# Patient Record
Sex: MALE | Race: WHITE | URBAN - METROPOLITAN AREA
[De-identification: names, ages, dates, MRNs, and addresses within clinical notes are randomized per-mention and may not be internally consistent; named-entity substitution may affect disease eponyms.]

---

## 2017-01-24 ENCOUNTER — OFFICE VISIT (OUTPATIENT)
Dept: FAMILY MEDICINE CLINIC | Age: 72
End: 2017-01-24

## 2017-01-24 VITALS
WEIGHT: 225 LBS | BODY MASS INDEX: 32.21 KG/M2 | HEART RATE: 78 BPM | HEIGHT: 70 IN | DIASTOLIC BLOOD PRESSURE: 76 MMHG | SYSTOLIC BLOOD PRESSURE: 122 MMHG

## 2017-01-24 DIAGNOSIS — Z12.11 SCREEN FOR COLON CANCER: ICD-10-CM

## 2017-01-24 DIAGNOSIS — E78.2 MIXED HYPERLIPIDEMIA: ICD-10-CM

## 2017-01-24 DIAGNOSIS — Z11.59 NEED FOR HEPATITIS B SCREENING TEST: ICD-10-CM

## 2017-01-24 DIAGNOSIS — I10 ESSENTIAL HYPERTENSION: Primary | ICD-10-CM

## 2017-01-24 LAB
A/G RATIO: 1.4 (ref 1.1–2.2)
ALBUMIN SERPL-MCNC: 4.4 G/DL (ref 3.4–5)
ALP BLD-CCNC: 82 U/L (ref 40–129)
ALT SERPL-CCNC: 16 U/L (ref 10–40)
ANION GAP SERPL CALCULATED.3IONS-SCNC: 14 MMOL/L (ref 3–16)
AST SERPL-CCNC: 19 U/L (ref 15–37)
BILIRUB SERPL-MCNC: 0.4 MG/DL (ref 0–1)
BUN BLDV-MCNC: 17 MG/DL (ref 7–20)
CALCIUM SERPL-MCNC: 10.3 MG/DL (ref 8.3–10.6)
CHLORIDE BLD-SCNC: 102 MMOL/L (ref 99–110)
CO2: 25 MMOL/L (ref 21–32)
CREAT SERPL-MCNC: 1 MG/DL (ref 0.8–1.3)
GFR AFRICAN AMERICAN: >60
GFR NON-AFRICAN AMERICAN: >60
GLOBULIN: 3.2 G/DL
GLUCOSE BLD-MCNC: 98 MG/DL (ref 70–99)
HAV IGM SER IA-ACNC: NORMAL
HEPATITIS B CORE IGM ANTIBODY: NORMAL
HEPATITIS B SURFACE ANTIGEN INTERPRETATION: NORMAL
HEPATITIS C ANTIBODY INTERPRETATION: NORMAL
POTASSIUM SERPL-SCNC: 4.9 MMOL/L (ref 3.5–5.1)
SODIUM BLD-SCNC: 141 MMOL/L (ref 136–145)
TOTAL PROTEIN: 7.6 G/DL (ref 6.4–8.2)

## 2017-01-24 PROCEDURE — 99214 OFFICE O/P EST MOD 30 MIN: CPT | Performed by: FAMILY MEDICINE

## 2017-01-24 PROCEDURE — 3288F FALL RISK ASSESSMENT DOCD: CPT | Performed by: FAMILY MEDICINE

## 2017-01-24 PROCEDURE — 36415 COLL VENOUS BLD VENIPUNCTURE: CPT | Performed by: FAMILY MEDICINE

## 2017-01-24 PROCEDURE — G8510 SCR DEP NEG, NO PLAN REQD: HCPCS | Performed by: FAMILY MEDICINE

## 2017-01-24 ASSESSMENT — PATIENT HEALTH QUESTIONNAIRE - PHQ9
1. LITTLE INTEREST OR PLEASURE IN DOING THINGS: 0
2. FEELING DOWN, DEPRESSED OR HOPELESS: 0
SUM OF ALL RESPONSES TO PHQ9 QUESTIONS 1 & 2: 0
SUM OF ALL RESPONSES TO PHQ QUESTIONS 1-9: 0

## 2017-01-25 LAB — HIV-1 AND HIV-2 ANTIBODIES: NORMAL

## 2017-01-26 ENCOUNTER — TELEPHONE (OUTPATIENT)
Dept: FAMILY MEDICINE CLINIC | Age: 72
End: 2017-01-26

## 2017-04-05 ENCOUNTER — TELEPHONE (OUTPATIENT)
Dept: FAMILY MEDICINE CLINIC | Age: 72
End: 2017-04-05

## 2017-04-20 RX ORDER — HYDROCHLOROTHIAZIDE 25 MG/1
TABLET ORAL
Qty: 90 TABLET | Refills: 3 | Status: SHIPPED | OUTPATIENT
Start: 2017-04-20 | End: 2018-06-27 | Stop reason: SDUPTHER

## 2017-05-23 ENCOUNTER — OFFICE VISIT (OUTPATIENT)
Dept: FAMILY MEDICINE CLINIC | Age: 72
End: 2017-05-23

## 2017-05-23 VITALS
SYSTOLIC BLOOD PRESSURE: 160 MMHG | HEART RATE: 74 BPM | HEIGHT: 68 IN | WEIGHT: 228 LBS | DIASTOLIC BLOOD PRESSURE: 88 MMHG | BODY MASS INDEX: 34.56 KG/M2

## 2017-05-23 DIAGNOSIS — G47.33 OBSTRUCTIVE SLEEP APNEA SYNDROME: ICD-10-CM

## 2017-05-23 DIAGNOSIS — I10 ESSENTIAL HYPERTENSION: Primary | ICD-10-CM

## 2017-05-23 DIAGNOSIS — E78.2 MIXED HYPERLIPIDEMIA: ICD-10-CM

## 2017-05-23 LAB
A/G RATIO: 1.4 (ref 1.1–2.2)
ALBUMIN SERPL-MCNC: 4.4 G/DL (ref 3.4–5)
ALP BLD-CCNC: 78 U/L (ref 40–129)
ALT SERPL-CCNC: 18 U/L (ref 10–40)
ANION GAP SERPL CALCULATED.3IONS-SCNC: 16 MMOL/L (ref 3–16)
AST SERPL-CCNC: 23 U/L (ref 15–37)
BILIRUB SERPL-MCNC: 0.4 MG/DL (ref 0–1)
BUN BLDV-MCNC: 15 MG/DL (ref 7–20)
CALCIUM SERPL-MCNC: 9.6 MG/DL (ref 8.3–10.6)
CHLORIDE BLD-SCNC: 105 MMOL/L (ref 99–110)
CO2: 22 MMOL/L (ref 21–32)
CREAT SERPL-MCNC: 0.9 MG/DL (ref 0.8–1.3)
GFR AFRICAN AMERICAN: >60
GFR NON-AFRICAN AMERICAN: >60
GLOBULIN: 3.1 G/DL
GLUCOSE BLD-MCNC: 100 MG/DL (ref 70–99)
POTASSIUM SERPL-SCNC: 4.8 MMOL/L (ref 3.5–5.1)
SODIUM BLD-SCNC: 143 MMOL/L (ref 136–145)
TOTAL PROTEIN: 7.5 G/DL (ref 6.4–8.2)

## 2017-05-23 PROCEDURE — 36415 COLL VENOUS BLD VENIPUNCTURE: CPT | Performed by: FAMILY MEDICINE

## 2017-05-23 PROCEDURE — 99214 OFFICE O/P EST MOD 30 MIN: CPT | Performed by: FAMILY MEDICINE

## 2017-05-25 ENCOUNTER — TELEPHONE (OUTPATIENT)
Dept: FAMILY MEDICINE CLINIC | Age: 72
End: 2017-05-25

## 2017-06-16 ENCOUNTER — OFFICE VISIT (OUTPATIENT)
Dept: FAMILY MEDICINE CLINIC | Age: 72
End: 2017-06-16

## 2017-06-16 VITALS
HEART RATE: 80 BPM | WEIGHT: 220 LBS | SYSTOLIC BLOOD PRESSURE: 160 MMHG | HEIGHT: 70 IN | BODY MASS INDEX: 31.5 KG/M2 | DIASTOLIC BLOOD PRESSURE: 88 MMHG

## 2017-06-16 DIAGNOSIS — G47.33 OBSTRUCTIVE SLEEP APNEA SYNDROME: Primary | ICD-10-CM

## 2017-06-16 DIAGNOSIS — I10 ESSENTIAL HYPERTENSION: ICD-10-CM

## 2017-06-16 PROCEDURE — 99213 OFFICE O/P EST LOW 20 MIN: CPT | Performed by: FAMILY MEDICINE

## 2017-06-26 RX ORDER — AMLODIPINE BESYLATE AND BENAZEPRIL HYDROCHLORIDE 10; 20 MG/1; MG/1
CAPSULE ORAL
Qty: 90 CAPSULE | Refills: 3 | Status: SHIPPED | OUTPATIENT
Start: 2017-06-26 | End: 2018-07-16 | Stop reason: SDUPTHER

## 2017-07-14 ENCOUNTER — OFFICE VISIT (OUTPATIENT)
Dept: FAMILY MEDICINE CLINIC | Age: 72
End: 2017-07-14

## 2017-07-14 VITALS
DIASTOLIC BLOOD PRESSURE: 80 MMHG | WEIGHT: 230 LBS | BODY MASS INDEX: 32.93 KG/M2 | SYSTOLIC BLOOD PRESSURE: 136 MMHG | HEIGHT: 70 IN | HEART RATE: 68 BPM

## 2017-07-14 DIAGNOSIS — E78.2 MIXED HYPERLIPIDEMIA: ICD-10-CM

## 2017-07-14 DIAGNOSIS — Z12.11 SCREEN FOR COLON CANCER: ICD-10-CM

## 2017-07-14 DIAGNOSIS — G47.33 OBSTRUCTIVE SLEEP APNEA SYNDROME: ICD-10-CM

## 2017-07-14 DIAGNOSIS — I10 ESSENTIAL HYPERTENSION: Primary | ICD-10-CM

## 2017-07-14 LAB
A/G RATIO: 1.5 (ref 1.1–2.2)
ALBUMIN SERPL-MCNC: 4.7 G/DL (ref 3.4–5)
ALP BLD-CCNC: 72 U/L (ref 40–129)
ALT SERPL-CCNC: 19 U/L (ref 10–40)
ANION GAP SERPL CALCULATED.3IONS-SCNC: 13 MMOL/L (ref 3–16)
AST SERPL-CCNC: 21 U/L (ref 15–37)
BILIRUB SERPL-MCNC: 0.4 MG/DL (ref 0–1)
BUN BLDV-MCNC: 14 MG/DL (ref 7–20)
CALCIUM SERPL-MCNC: 10.4 MG/DL (ref 8.3–10.6)
CHLORIDE BLD-SCNC: 101 MMOL/L (ref 99–110)
CHOLESTEROL, TOTAL: 202 MG/DL (ref 0–199)
CO2: 26 MMOL/L (ref 21–32)
CREAT SERPL-MCNC: 0.9 MG/DL (ref 0.8–1.3)
GFR AFRICAN AMERICAN: >60
GFR NON-AFRICAN AMERICAN: >60
GLOBULIN: 3.2 G/DL
GLUCOSE BLD-MCNC: 115 MG/DL (ref 70–99)
HDLC SERPL-MCNC: 34 MG/DL (ref 40–60)
LDL CHOLESTEROL CALCULATED: 110 MG/DL
POTASSIUM SERPL-SCNC: 4.3 MMOL/L (ref 3.5–5.1)
SODIUM BLD-SCNC: 140 MMOL/L (ref 136–145)
TOTAL PROTEIN: 7.9 G/DL (ref 6.4–8.2)
TRIGL SERPL-MCNC: 289 MG/DL (ref 0–150)
VLDLC SERPL CALC-MCNC: 58 MG/DL

## 2017-07-14 PROCEDURE — 36415 COLL VENOUS BLD VENIPUNCTURE: CPT | Performed by: FAMILY MEDICINE

## 2017-07-14 PROCEDURE — 99214 OFFICE O/P EST MOD 30 MIN: CPT | Performed by: FAMILY MEDICINE

## 2017-08-17 ENCOUNTER — TELEPHONE (OUTPATIENT)
Dept: FAMILY MEDICINE CLINIC | Age: 72
End: 2017-08-17

## 2017-08-18 ENCOUNTER — TELEPHONE (OUTPATIENT)
Dept: FAMILY MEDICINE CLINIC | Age: 72
End: 2017-08-18

## 2017-10-05 ENCOUNTER — TELEPHONE (OUTPATIENT)
Dept: OTHER | Facility: CLINIC | Age: 72
End: 2017-10-05

## 2017-10-12 ENCOUNTER — TELEPHONE (OUTPATIENT)
Dept: FAMILY MEDICINE CLINIC | Age: 72
End: 2017-10-12

## 2017-11-09 ENCOUNTER — TELEPHONE (OUTPATIENT)
Dept: FAMILY MEDICINE CLINIC | Age: 72
End: 2017-11-09

## 2017-11-20 ENCOUNTER — OFFICE VISIT (OUTPATIENT)
Dept: FAMILY MEDICINE CLINIC | Age: 72
End: 2017-11-20

## 2017-11-20 VITALS
HEIGHT: 70 IN | BODY MASS INDEX: 32.64 KG/M2 | HEART RATE: 78 BPM | DIASTOLIC BLOOD PRESSURE: 92 MMHG | SYSTOLIC BLOOD PRESSURE: 142 MMHG | WEIGHT: 228 LBS

## 2017-11-20 DIAGNOSIS — Z12.5 SCREENING FOR PROSTATE CANCER: ICD-10-CM

## 2017-11-20 DIAGNOSIS — I10 ESSENTIAL HYPERTENSION: ICD-10-CM

## 2017-11-20 DIAGNOSIS — Z12.11 SCREEN FOR COLON CANCER: ICD-10-CM

## 2017-11-20 DIAGNOSIS — G47.33 OBSTRUCTIVE SLEEP APNEA SYNDROME: ICD-10-CM

## 2017-11-20 DIAGNOSIS — E78.2 MIXED HYPERLIPIDEMIA: Primary | ICD-10-CM

## 2017-11-20 DIAGNOSIS — Z23 NEEDS FLU SHOT: ICD-10-CM

## 2017-11-20 DIAGNOSIS — Z23 NEED FOR PROPHYLACTIC VACCINATION AGAINST STREPTOCOCCUS PNEUMONIAE (PNEUMOCOCCUS): ICD-10-CM

## 2017-11-20 LAB
A/G RATIO: 1.3 (ref 1.1–2.2)
ALBUMIN SERPL-MCNC: 4.3 G/DL (ref 3.4–5)
ALP BLD-CCNC: 80 U/L (ref 40–129)
ALT SERPL-CCNC: 21 U/L (ref 10–40)
ANION GAP SERPL CALCULATED.3IONS-SCNC: 14 MMOL/L (ref 3–16)
AST SERPL-CCNC: 25 U/L (ref 15–37)
BILIRUB SERPL-MCNC: 0.6 MG/DL (ref 0–1)
BUN BLDV-MCNC: 16 MG/DL (ref 7–20)
CALCIUM SERPL-MCNC: 10 MG/DL (ref 8.3–10.6)
CHLORIDE BLD-SCNC: 98 MMOL/L (ref 99–110)
CO2: 25 MMOL/L (ref 21–32)
CREAT SERPL-MCNC: 1.1 MG/DL (ref 0.8–1.3)
GFR AFRICAN AMERICAN: >60
GFR NON-AFRICAN AMERICAN: >60
GLOBULIN: 3.2 G/DL
GLUCOSE BLD-MCNC: 125 MG/DL (ref 70–99)
POTASSIUM SERPL-SCNC: 3.9 MMOL/L (ref 3.5–5.1)
SODIUM BLD-SCNC: 137 MMOL/L (ref 136–145)
TOTAL PROTEIN: 7.5 G/DL (ref 6.4–8.2)

## 2017-11-20 PROCEDURE — G0009 ADMIN PNEUMOCOCCAL VACCINE: HCPCS | Performed by: FAMILY MEDICINE

## 2017-11-20 PROCEDURE — 99214 OFFICE O/P EST MOD 30 MIN: CPT | Performed by: FAMILY MEDICINE

## 2017-11-20 PROCEDURE — 90670 PCV13 VACCINE IM: CPT | Performed by: FAMILY MEDICINE

## 2017-11-20 PROCEDURE — 36415 COLL VENOUS BLD VENIPUNCTURE: CPT | Performed by: FAMILY MEDICINE

## 2017-11-20 PROCEDURE — G0008 ADMIN INFLUENZA VIRUS VAC: HCPCS | Performed by: FAMILY MEDICINE

## 2017-11-20 PROCEDURE — 90662 IIV NO PRSV INCREASED AG IM: CPT | Performed by: FAMILY MEDICINE

## 2017-11-20 NOTE — PROGRESS NOTES
Chief Complaint   Patient presents with    3 Month Follow-Up    Hypertension       HPI / ROS: Eddie Anton presents for evaluation and management of :    # HTN denies CP/SOB shaen meds takes baby asa daily aslo  #2 hyperTG on fenofibrate shane this  Labs UTD  Lab Results   Component Value Date    LDLCALC 110 (H) 07/14/2017    LDLDIRECT 146 (H) 09/19/2014       Lab Results   Component Value Date    LDLCALC 118 (H) 06/24/2016    LDLDIRECT 146 (H) 09/19/2014     # JANICE using CPAP machine shane this helps sleep feels well on this  # screen prostate due  # screen colon ca FIT cards advised  He lost his stool cards    Lab Results   Component Value Date    PSA 2.12 09/23/2016    PSA 2.53 07/10/2015    PSA 2.95 06/21/2013         Patient's allergies, past family, medical, surgical, and social history, Rx and OTC meds are reviewed as part of today's visit and Marked as Reviewed. Objective   Wt Readings from Last 3 Encounters:   11/20/17 228 lb (103.4 kg)   07/14/17 230 lb (104.3 kg)   06/16/17 220 lb (99.8 kg)       A&O  BP (!) 142/92   Pulse 78   Ht 5' 10\" (1.778 m)   Wt 228 lb (103.4 kg)   BMI 32.71 kg/m²   Eyes no scleral icterus  Skin no rash no jaundice  Neck no TMG no LAD  Car reg no MGR  Lungs CTA  abd benign soft  Ext no pitting edema  Psych: Judgement and insight are intact, no pressured speech; no psychomotor retardation or agitation; affect and mood congruent    1. Mixed hyperlipidemia  Comprehensive Metabolic Panel    LFTs on fenofibrate   2. Essential hypertension  Comprehensive Metabolic Panel    at goal check renal cont meds   3. Obstructive sleep apnea syndrome      advised be continuous with cpap he is hit and miss   4. Needs flu shot  INFLUENZA, HIGH DOSE, 65 YRS +, IM, PF, PREFILL SYR, 0.5ML (FLUZONE HD)   5. Need for prophylactic vaccination against Streptococcus pneumoniae (pneumococcus)  Pneumococcal conjugate vaccine 13-valent IM (PREVNAR 13)   6. Screening for prostate cancer  Psa screening   7. Screen for colon cancer  POCT Fecal Immunochemical Test (FIT)     Orders Placed This Encounter   Procedures    INFLUENZA, HIGH DOSE, 65 YRS +, IM, PF, PREFILL SYR, 0.5ML (FLUZONE HD)    Pneumococcal conjugate vaccine 13-valent IM (PREVNAR 13)    Comprehensive Metabolic Panel    Psa screening    POCT Fecal Immunochemical Test (FIT)     Standing Status:   Future     Standing Expiration Date:   11/20/2018

## 2017-11-21 LAB — PROSTATE SPECIFIC ANTIGEN: 2.46 NG/ML (ref 0–4)

## 2018-01-16 ENCOUNTER — TELEPHONE (OUTPATIENT)
Dept: FAMILY MEDICINE CLINIC | Age: 73
End: 2018-01-16

## 2018-03-01 ENCOUNTER — TELEPHONE (OUTPATIENT)
Dept: FAMILY MEDICINE CLINIC | Age: 73
End: 2018-03-01

## 2018-03-01 NOTE — LETTER
[facility]  [facility]  [id number]  Dept: [id number] [facility] MD Erica        3/1/2018    [place] [phone]      Dear Mariah Stack:    This letter is a reminder that have a stool card that has not been completed. Please complete the stool collection and drop it back off. It is important to your well-being that these test(s) are performed. Please call our office at Dept: [phone] for additional information on the outstanding tests or let us know if they have been completed so we may update your chart.     Sincerely,        Penny Summers MD

## 2018-03-20 ENCOUNTER — OFFICE VISIT (OUTPATIENT)
Dept: FAMILY MEDICINE CLINIC | Age: 73
End: 2018-03-20

## 2018-03-20 VITALS
SYSTOLIC BLOOD PRESSURE: 136 MMHG | HEIGHT: 70 IN | BODY MASS INDEX: 32.71 KG/M2 | OXYGEN SATURATION: 97 % | WEIGHT: 228.5 LBS | HEART RATE: 68 BPM | DIASTOLIC BLOOD PRESSURE: 84 MMHG

## 2018-03-20 DIAGNOSIS — E78.2 MIXED HYPERLIPIDEMIA: Primary | ICD-10-CM

## 2018-03-20 DIAGNOSIS — G47.33 OBSTRUCTIVE SLEEP APNEA SYNDROME: ICD-10-CM

## 2018-03-20 DIAGNOSIS — K21.00 GASTROESOPHAGEAL REFLUX DISEASE WITH ESOPHAGITIS: ICD-10-CM

## 2018-03-20 DIAGNOSIS — Z12.11 SCREEN FOR COLON CANCER: ICD-10-CM

## 2018-03-20 DIAGNOSIS — I10 ESSENTIAL HYPERTENSION: ICD-10-CM

## 2018-03-20 LAB
A/G RATIO: 1.6 (ref 1.1–2.2)
ALBUMIN SERPL-MCNC: 4.6 G/DL (ref 3.4–5)
ALP BLD-CCNC: 80 U/L (ref 40–129)
ALT SERPL-CCNC: 17 U/L (ref 10–40)
ANION GAP SERPL CALCULATED.3IONS-SCNC: 17 MMOL/L (ref 3–16)
AST SERPL-CCNC: 18 U/L (ref 15–37)
BILIRUB SERPL-MCNC: 0.5 MG/DL (ref 0–1)
BUN BLDV-MCNC: 11 MG/DL (ref 7–20)
CALCIUM SERPL-MCNC: 9.3 MG/DL (ref 8.3–10.6)
CHLORIDE BLD-SCNC: 100 MMOL/L (ref 99–110)
CO2: 26 MMOL/L (ref 21–32)
CONTROL: NORMAL
CREAT SERPL-MCNC: 0.8 MG/DL (ref 0.8–1.3)
GFR AFRICAN AMERICAN: >60
GFR NON-AFRICAN AMERICAN: >60
GLOBULIN: 2.9 G/DL
GLUCOSE BLD-MCNC: 135 MG/DL (ref 70–99)
HEMOCCULT STL QL: NEGATIVE
POTASSIUM SERPL-SCNC: 4.1 MMOL/L (ref 3.5–5.1)
SODIUM BLD-SCNC: 143 MMOL/L (ref 136–145)
TOTAL PROTEIN: 7.5 G/DL (ref 6.4–8.2)

## 2018-03-20 PROCEDURE — 82274 ASSAY TEST FOR BLOOD FECAL: CPT | Performed by: FAMILY MEDICINE

## 2018-03-20 PROCEDURE — 99213 OFFICE O/P EST LOW 20 MIN: CPT | Performed by: FAMILY MEDICINE

## 2018-03-20 PROCEDURE — 36415 COLL VENOUS BLD VENIPUNCTURE: CPT | Performed by: FAMILY MEDICINE

## 2018-03-20 ASSESSMENT — PATIENT HEALTH QUESTIONNAIRE - PHQ9
2. FEELING DOWN, DEPRESSED OR HOPELESS: 0
SUM OF ALL RESPONSES TO PHQ9 QUESTIONS 1 & 2: 0
SUM OF ALL RESPONSES TO PHQ QUESTIONS 1-9: 0
1. LITTLE INTEREST OR PLEASURE IN DOING THINGS: 0

## 2018-04-25 ENCOUNTER — OFFICE VISIT (OUTPATIENT)
Dept: FAMILY MEDICINE CLINIC | Age: 73
End: 2018-04-25

## 2018-04-25 VITALS
HEART RATE: 100 BPM | HEIGHT: 70 IN | BODY MASS INDEX: 32.93 KG/M2 | DIASTOLIC BLOOD PRESSURE: 88 MMHG | SYSTOLIC BLOOD PRESSURE: 138 MMHG | WEIGHT: 230 LBS | OXYGEN SATURATION: 98 %

## 2018-04-25 DIAGNOSIS — M25.562 ACUTE PAIN OF LEFT KNEE: Primary | ICD-10-CM

## 2018-04-25 LAB — URIC ACID, SERUM: 6.2 MG/DL (ref 3.5–7.2)

## 2018-04-25 PROCEDURE — 36415 COLL VENOUS BLD VENIPUNCTURE: CPT | Performed by: FAMILY MEDICINE

## 2018-04-25 PROCEDURE — 99213 OFFICE O/P EST LOW 20 MIN: CPT | Performed by: FAMILY MEDICINE

## 2018-04-26 ENCOUNTER — TELEPHONE (OUTPATIENT)
Dept: FAMILY MEDICINE CLINIC | Age: 73
End: 2018-04-26

## 2018-05-02 ENCOUNTER — PROCEDURE VISIT (OUTPATIENT)
Dept: FAMILY MEDICINE CLINIC | Age: 73
End: 2018-05-02

## 2018-05-02 VITALS
DIASTOLIC BLOOD PRESSURE: 86 MMHG | OXYGEN SATURATION: 97 % | HEIGHT: 70 IN | SYSTOLIC BLOOD PRESSURE: 132 MMHG | BODY MASS INDEX: 32.43 KG/M2 | WEIGHT: 226.5 LBS | HEART RATE: 64 BPM

## 2018-05-02 DIAGNOSIS — M17.12 PRIMARY OSTEOARTHRITIS OF LEFT KNEE: Primary | ICD-10-CM

## 2018-05-02 PROCEDURE — 20610 DRAIN/INJ JOINT/BURSA W/O US: CPT | Performed by: FAMILY MEDICINE

## 2018-05-02 PROCEDURE — 99999 PR OFFICE/OUTPT VISIT,PROCEDURE ONLY: CPT | Performed by: FAMILY MEDICINE

## 2018-05-07 RX ORDER — METHYLPREDNISOLONE ACETATE 80 MG/ML
80 INJECTION, SUSPENSION INTRA-ARTICULAR; INTRALESIONAL; INTRAMUSCULAR; SOFT TISSUE ONCE
Status: COMPLETED | OUTPATIENT
Start: 2018-05-07 | End: 2018-05-08

## 2018-05-08 RX ADMIN — METHYLPREDNISOLONE ACETATE 80 MG: 80 INJECTION, SUSPENSION INTRA-ARTICULAR; INTRALESIONAL; INTRAMUSCULAR; SOFT TISSUE at 09:03

## 2018-06-28 RX ORDER — HYDROCHLOROTHIAZIDE 25 MG/1
TABLET ORAL
Qty: 90 TABLET | Refills: 3 | Status: SHIPPED | OUTPATIENT
Start: 2018-06-28 | End: 2019-08-31 | Stop reason: SDUPTHER

## 2018-07-16 DIAGNOSIS — I10 ESSENTIAL HYPERTENSION: Primary | ICD-10-CM

## 2018-07-16 RX ORDER — AMLODIPINE BESYLATE AND BENAZEPRIL HYDROCHLORIDE 10; 20 MG/1; MG/1
CAPSULE ORAL
Qty: 90 CAPSULE | Refills: 3 | Status: SHIPPED | OUTPATIENT
Start: 2018-07-16 | End: 2019-10-11 | Stop reason: SDUPTHER

## 2018-08-30 ENCOUNTER — OFFICE VISIT (OUTPATIENT)
Dept: FAMILY MEDICINE CLINIC | Age: 73
End: 2018-08-30

## 2018-08-30 VITALS
SYSTOLIC BLOOD PRESSURE: 126 MMHG | HEIGHT: 70 IN | HEART RATE: 58 BPM | WEIGHT: 231.13 LBS | BODY MASS INDEX: 33.09 KG/M2 | OXYGEN SATURATION: 97 % | DIASTOLIC BLOOD PRESSURE: 70 MMHG

## 2018-08-30 DIAGNOSIS — I10 ESSENTIAL HYPERTENSION: ICD-10-CM

## 2018-08-30 DIAGNOSIS — G47.33 OBSTRUCTIVE SLEEP APNEA SYNDROME: ICD-10-CM

## 2018-08-30 DIAGNOSIS — E78.2 MIXED HYPERLIPIDEMIA: Primary | ICD-10-CM

## 2018-08-30 DIAGNOSIS — K21.00 GASTROESOPHAGEAL REFLUX DISEASE WITH ESOPHAGITIS: ICD-10-CM

## 2018-08-30 LAB
A/G RATIO: 1.5 (ref 1.1–2.2)
ALBUMIN SERPL-MCNC: 4.5 G/DL (ref 3.4–5)
ALP BLD-CCNC: 74 U/L (ref 40–129)
ALT SERPL-CCNC: 17 U/L (ref 10–40)
ANION GAP SERPL CALCULATED.3IONS-SCNC: 14 MMOL/L (ref 3–16)
AST SERPL-CCNC: 20 U/L (ref 15–37)
BILIRUB SERPL-MCNC: 0.4 MG/DL (ref 0–1)
BUN BLDV-MCNC: 13 MG/DL (ref 7–20)
CALCIUM SERPL-MCNC: 9.9 MG/DL (ref 8.3–10.6)
CHLORIDE BLD-SCNC: 104 MMOL/L (ref 99–110)
CHOLESTEROL, TOTAL: 194 MG/DL (ref 0–199)
CO2: 22 MMOL/L (ref 21–32)
CREAT SERPL-MCNC: 0.9 MG/DL (ref 0.8–1.3)
GFR AFRICAN AMERICAN: >60
GFR NON-AFRICAN AMERICAN: >60
GLOBULIN: 3 G/DL
GLUCOSE BLD-MCNC: 108 MG/DL (ref 70–99)
HDLC SERPL-MCNC: 33 MG/DL (ref 40–60)
LDL CHOLESTEROL CALCULATED: 112 MG/DL
POTASSIUM SERPL-SCNC: 4.3 MMOL/L (ref 3.5–5.1)
SODIUM BLD-SCNC: 140 MMOL/L (ref 136–145)
TOTAL PROTEIN: 7.5 G/DL (ref 6.4–8.2)
TRIGL SERPL-MCNC: 243 MG/DL (ref 0–150)
VLDLC SERPL CALC-MCNC: 49 MG/DL

## 2018-08-30 PROCEDURE — 36415 COLL VENOUS BLD VENIPUNCTURE: CPT | Performed by: FAMILY MEDICINE

## 2018-08-30 PROCEDURE — 99214 OFFICE O/P EST MOD 30 MIN: CPT | Performed by: FAMILY MEDICINE

## 2018-08-30 NOTE — PROGRESS NOTES
Chief Complaint   Patient presents with    Hypertension     No family history on file. Social History     Social History    Marital status:      Spouse name: N/A    Number of children: N/A    Years of education: N/A     Occupational History    Not on file. Social History Main Topics    Smoking status: Former Smoker     Packs/day: 1.50     Years: 25.00     Quit date: 3/26/1988    Smokeless tobacco: Never Used    Alcohol use Yes      Comment: Rare    Drug use: Unknown    Sexual activity: Not on file     Other Topics Concern    Not on file     Social History Narrative    No narrative on file       Current Outpatient Prescriptions:     amLODIPine-benazepril (LOTREL) 10-20 MG per capsule, TAKE ONE CAPSULE BY MOUTH DAILY, Disp: 90 capsule, Rfl: 3    hydrochlorothiazide (HYDRODIURIL) 25 MG tablet, TAKE ONE TABLET BY MOUTH DAILY, Disp: 90 tablet, Rfl: 3    metoprolol succinate (TOPROL XL) 200 MG extended release tablet, TAKE 1 TABLET BY MOUTH DAILY, Disp: 90 tablet, Rfl: 3    fenofibrate (TRICOR) 145 MG tablet, TAKE ONE TABLET BY MOUTH DAILY, Disp: 90 tablet, Rfl: 3  Allergies   Allergen Reactions    Other Swelling     Pain med that starts with a \"D\". Took it in the 60's when leg was broken. Patient Active Problem List   Diagnosis    Hyperlipidemia    Hypertension    Sleep apnea    Gastroesophageal reflux disease with esophagitis       HPI / ROS: Josse Yap presents for evaluation and management of :    # HTN shane meds no CP? SOB  #  hyperTG   # screen prostate  Lab Results   Component Value Date    PSA 2.46 11/20/2017    PSA 2.12 09/23/2016    PSA 2.53 07/10/2015         Lab Results   Component Value Date    LDLCALC 110 (H) 07/14/2017    LDLDIRECT 146 (H) 09/19/2014     # GERD OK PPI per pt  # Sleep apnea not using as needs new mask this is rx'd      Objective   Wt Readings from Last 3 Encounters:   08/30/18 231 lb 2 oz (104.8 kg)   05/02/18 226 lb 8 oz (102.7 kg)   04/25/18 230 lb

## 2018-11-01 DIAGNOSIS — I10 ESSENTIAL HYPERTENSION: Primary | ICD-10-CM

## 2018-11-02 RX ORDER — METOPROLOL SUCCINATE 200 MG/1
TABLET, EXTENDED RELEASE ORAL
Qty: 90 TABLET | Refills: 2 | Status: SHIPPED | OUTPATIENT
Start: 2018-11-02 | End: 2020-01-17

## 2018-12-18 RX ORDER — FENOFIBRATE 145 MG/1
TABLET, COATED ORAL
Qty: 90 TABLET | Refills: 2 | Status: SHIPPED | OUTPATIENT
Start: 2018-12-18 | End: 2020-01-31

## 2019-03-04 ENCOUNTER — OFFICE VISIT (OUTPATIENT)
Dept: FAMILY MEDICINE CLINIC | Age: 74
End: 2019-03-04
Payer: COMMERCIAL

## 2019-03-04 VITALS
HEIGHT: 70 IN | OXYGEN SATURATION: 97 % | WEIGHT: 233 LBS | DIASTOLIC BLOOD PRESSURE: 76 MMHG | HEART RATE: 67 BPM | RESPIRATION RATE: 16 BRPM | BODY MASS INDEX: 33.36 KG/M2 | SYSTOLIC BLOOD PRESSURE: 130 MMHG

## 2019-03-04 DIAGNOSIS — K21.00 GASTROESOPHAGEAL REFLUX DISEASE WITH ESOPHAGITIS: ICD-10-CM

## 2019-03-04 DIAGNOSIS — I10 ESSENTIAL HYPERTENSION: ICD-10-CM

## 2019-03-04 DIAGNOSIS — Z12.5 SCREENING FOR MALIGNANT NEOPLASM OF PROSTATE: ICD-10-CM

## 2019-03-04 DIAGNOSIS — G47.33 OBSTRUCTIVE SLEEP APNEA SYNDROME: ICD-10-CM

## 2019-03-04 DIAGNOSIS — E78.2 MIXED HYPERLIPIDEMIA: Primary | ICD-10-CM

## 2019-03-04 DIAGNOSIS — Z12.11 SCREEN FOR COLON CANCER: ICD-10-CM

## 2019-03-04 LAB
A/G RATIO: 1.4 (ref 1.1–2.2)
ALBUMIN SERPL-MCNC: 4.2 G/DL (ref 3.4–5)
ALP BLD-CCNC: 81 U/L (ref 40–129)
ALT SERPL-CCNC: 18 U/L (ref 10–40)
ANION GAP SERPL CALCULATED.3IONS-SCNC: 14 MMOL/L (ref 3–16)
AST SERPL-CCNC: 31 U/L (ref 15–37)
BILIRUB SERPL-MCNC: 0.4 MG/DL (ref 0–1)
BUN BLDV-MCNC: 14 MG/DL (ref 7–20)
CALCIUM SERPL-MCNC: 10.2 MG/DL (ref 8.3–10.6)
CHLORIDE BLD-SCNC: 103 MMOL/L (ref 99–110)
CHOLESTEROL, TOTAL: 206 MG/DL (ref 0–199)
CO2: 23 MMOL/L (ref 21–32)
CREAT SERPL-MCNC: 1 MG/DL (ref 0.8–1.3)
GFR AFRICAN AMERICAN: >60
GFR NON-AFRICAN AMERICAN: >60
GLOBULIN: 3.1 G/DL
GLUCOSE BLD-MCNC: 121 MG/DL (ref 70–99)
HDLC SERPL-MCNC: 31 MG/DL (ref 40–60)
LDL CHOLESTEROL CALCULATED: 118 MG/DL
POTASSIUM SERPL-SCNC: 5.2 MMOL/L (ref 3.5–5.1)
PROSTATE SPECIFIC ANTIGEN: 3.02 NG/ML (ref 0–4)
SODIUM BLD-SCNC: 140 MMOL/L (ref 136–145)
TOTAL PROTEIN: 7.3 G/DL (ref 6.4–8.2)
TRIGL SERPL-MCNC: 285 MG/DL (ref 0–150)
VLDLC SERPL CALC-MCNC: 57 MG/DL

## 2019-03-04 PROCEDURE — 99214 OFFICE O/P EST MOD 30 MIN: CPT | Performed by: FAMILY MEDICINE

## 2019-03-04 PROCEDURE — 36415 COLL VENOUS BLD VENIPUNCTURE: CPT | Performed by: FAMILY MEDICINE

## 2019-03-04 ASSESSMENT — PATIENT HEALTH QUESTIONNAIRE - PHQ9
1. LITTLE INTEREST OR PLEASURE IN DOING THINGS: 0
2. FEELING DOWN, DEPRESSED OR HOPELESS: 0
SUM OF ALL RESPONSES TO PHQ9 QUESTIONS 1 & 2: 0
SUM OF ALL RESPONSES TO PHQ QUESTIONS 1-9: 0
SUM OF ALL RESPONSES TO PHQ QUESTIONS 1-9: 0

## 2019-03-05 ENCOUNTER — TELEPHONE (OUTPATIENT)
Dept: FAMILY MEDICINE CLINIC | Age: 74
End: 2019-03-05

## 2019-03-05 DIAGNOSIS — R73.9 BLOOD GLUCOSE ELEVATED: Primary | ICD-10-CM

## 2019-03-05 LAB
ESTIMATED AVERAGE GLUCOSE: 134.1 MG/DL
HBA1C MFR BLD: 6.3 %

## 2019-03-05 PROCEDURE — 83036 HEMOGLOBIN GLYCOSYLATED A1C: CPT | Performed by: FAMILY MEDICINE

## 2019-04-09 ENCOUNTER — TELEPHONE (OUTPATIENT)
Dept: FAMILY MEDICINE CLINIC | Age: 74
End: 2019-04-09

## 2019-07-09 ENCOUNTER — OFFICE VISIT (OUTPATIENT)
Dept: FAMILY MEDICINE CLINIC | Age: 74
End: 2019-07-09
Payer: COMMERCIAL

## 2019-07-09 VITALS
HEIGHT: 70 IN | SYSTOLIC BLOOD PRESSURE: 136 MMHG | DIASTOLIC BLOOD PRESSURE: 80 MMHG | BODY MASS INDEX: 33.27 KG/M2 | RESPIRATION RATE: 14 BRPM | OXYGEN SATURATION: 96 % | HEART RATE: 68 BPM | WEIGHT: 232.4 LBS

## 2019-07-09 DIAGNOSIS — I10 ESSENTIAL HYPERTENSION: ICD-10-CM

## 2019-07-09 DIAGNOSIS — E78.2 MIXED HYPERLIPIDEMIA: Primary | ICD-10-CM

## 2019-07-09 DIAGNOSIS — K21.00 GASTROESOPHAGEAL REFLUX DISEASE WITH ESOPHAGITIS: ICD-10-CM

## 2019-07-09 DIAGNOSIS — G47.33 OBSTRUCTIVE SLEEP APNEA SYNDROME: ICD-10-CM

## 2019-07-09 LAB
A/G RATIO: 1.6 (ref 1.1–2.2)
ALBUMIN SERPL-MCNC: 4.7 G/DL (ref 3.4–5)
ALP BLD-CCNC: 86 U/L (ref 40–129)
ALT SERPL-CCNC: 19 U/L (ref 10–40)
ANION GAP SERPL CALCULATED.3IONS-SCNC: 13 MMOL/L (ref 3–16)
AST SERPL-CCNC: 22 U/L (ref 15–37)
BILIRUB SERPL-MCNC: 0.7 MG/DL (ref 0–1)
BUN BLDV-MCNC: 13 MG/DL (ref 7–20)
CALCIUM SERPL-MCNC: 10.2 MG/DL (ref 8.3–10.6)
CHLORIDE BLD-SCNC: 104 MMOL/L (ref 99–110)
CO2: 23 MMOL/L (ref 21–32)
CREAT SERPL-MCNC: 0.9 MG/DL (ref 0.8–1.3)
GFR AFRICAN AMERICAN: >60
GFR NON-AFRICAN AMERICAN: >60
GLOBULIN: 3 G/DL
GLUCOSE BLD-MCNC: 101 MG/DL (ref 70–99)
POTASSIUM SERPL-SCNC: 4.8 MMOL/L (ref 3.5–5.1)
SODIUM BLD-SCNC: 140 MMOL/L (ref 136–145)
TOTAL PROTEIN: 7.7 G/DL (ref 6.4–8.2)

## 2019-07-09 PROCEDURE — 99214 OFFICE O/P EST MOD 30 MIN: CPT | Performed by: FAMILY MEDICINE

## 2019-07-09 PROCEDURE — 36415 COLL VENOUS BLD VENIPUNCTURE: CPT | Performed by: FAMILY MEDICINE

## 2019-07-09 NOTE — PROGRESS NOTES
Chief Complaint   Patient presents with    Gastroesophageal Reflux    Hyperlipidemia    Hypertension     No family history on file.   Social History     Socioeconomic History    Marital status:      Spouse name: Not on file    Number of children: Not on file    Years of education: Not on file    Highest education level: Not on file   Occupational History    Not on file   Social Needs    Financial resource strain: Not on file    Food insecurity:     Worry: Not on file     Inability: Not on file    Transportation needs:     Medical: Not on file     Non-medical: Not on file   Tobacco Use    Smoking status: Former Smoker     Packs/day: 1.50     Years: 25.00     Pack years: 37.50     Last attempt to quit: 3/26/1988     Years since quittin.3    Smokeless tobacco: Never Used   Substance and Sexual Activity    Alcohol use: Yes     Comment: Rare    Drug use: Not on file    Sexual activity: Not on file   Lifestyle    Physical activity:     Days per week: Not on file     Minutes per session: Not on file    Stress: Not on file   Relationships    Social connections:     Talks on phone: Not on file     Gets together: Not on file     Attends Restoration service: Not on file     Active member of club or organization: Not on file     Attends meetings of clubs or organizations: Not on file     Relationship status: Not on file    Intimate partner violence:     Fear of current or ex partner: Not on file     Emotionally abused: Not on file     Physically abused: Not on file     Forced sexual activity: Not on file   Other Topics Concern    Not on file   Social History Narrative    Not on file       Current Outpatient Medications:     fenofibrate (TRICOR) 145 MG tablet, TAKE ONE TABLET BY MOUTH DAILY, Disp: 90 tablet, Rfl: 2    metoprolol succinate (TOPROL XL) 200 MG extended release tablet, TAKE ONE TABLET BY MOUTH DAILY, Disp: 90 tablet, Rfl: 2    amLODIPine-benazepril (LOTREL) 10-20 MG per capsule,

## 2019-09-03 RX ORDER — HYDROCHLOROTHIAZIDE 25 MG/1
TABLET ORAL
Qty: 90 TABLET | Refills: 2 | Status: SHIPPED | OUTPATIENT
Start: 2019-09-03 | End: 2020-09-03 | Stop reason: SDUPTHER

## 2019-10-11 DIAGNOSIS — I10 ESSENTIAL HYPERTENSION: ICD-10-CM

## 2019-10-11 RX ORDER — AMLODIPINE BESYLATE AND BENAZEPRIL HYDROCHLORIDE 10; 20 MG/1; MG/1
CAPSULE ORAL
Qty: 90 CAPSULE | Refills: 2 | Status: SHIPPED | OUTPATIENT
Start: 2019-10-11 | End: 2020-11-06

## 2019-11-20 ENCOUNTER — OFFICE VISIT (OUTPATIENT)
Dept: FAMILY MEDICINE CLINIC | Age: 74
End: 2019-11-20
Payer: COMMERCIAL

## 2019-11-20 VITALS
SYSTOLIC BLOOD PRESSURE: 130 MMHG | OXYGEN SATURATION: 97 % | HEART RATE: 64 BPM | DIASTOLIC BLOOD PRESSURE: 78 MMHG | BODY MASS INDEX: 33.5 KG/M2 | WEIGHT: 234 LBS | HEIGHT: 70 IN | RESPIRATION RATE: 14 BRPM

## 2019-11-20 DIAGNOSIS — Z23 NEEDS FLU SHOT: ICD-10-CM

## 2019-11-20 DIAGNOSIS — E78.2 MIXED HYPERLIPIDEMIA: Primary | ICD-10-CM

## 2019-11-20 DIAGNOSIS — G47.33 OBSTRUCTIVE SLEEP APNEA SYNDROME: ICD-10-CM

## 2019-11-20 DIAGNOSIS — I10 ESSENTIAL HYPERTENSION: ICD-10-CM

## 2019-11-20 DIAGNOSIS — K21.00 GASTROESOPHAGEAL REFLUX DISEASE WITH ESOPHAGITIS: ICD-10-CM

## 2019-11-20 LAB
A/G RATIO: 1.5 (ref 1.1–2.2)
ALBUMIN SERPL-MCNC: 4.5 G/DL (ref 3.4–5)
ALP BLD-CCNC: 81 U/L (ref 40–129)
ALT SERPL-CCNC: 18 U/L (ref 10–40)
ANION GAP SERPL CALCULATED.3IONS-SCNC: 16 MMOL/L (ref 3–16)
AST SERPL-CCNC: 22 U/L (ref 15–37)
BILIRUB SERPL-MCNC: 0.5 MG/DL (ref 0–1)
BUN BLDV-MCNC: 13 MG/DL (ref 7–20)
CALCIUM SERPL-MCNC: 9.9 MG/DL (ref 8.3–10.6)
CHLORIDE BLD-SCNC: 100 MMOL/L (ref 99–110)
CO2: 23 MMOL/L (ref 21–32)
CREAT SERPL-MCNC: 0.9 MG/DL (ref 0.8–1.3)
GFR AFRICAN AMERICAN: >60
GFR NON-AFRICAN AMERICAN: >60
GLOBULIN: 3 G/DL
GLUCOSE BLD-MCNC: 105 MG/DL (ref 70–99)
POTASSIUM SERPL-SCNC: 4.8 MMOL/L (ref 3.5–5.1)
SODIUM BLD-SCNC: 139 MMOL/L (ref 136–145)
TOTAL PROTEIN: 7.5 G/DL (ref 6.4–8.2)

## 2019-11-20 PROCEDURE — 36415 COLL VENOUS BLD VENIPUNCTURE: CPT | Performed by: FAMILY MEDICINE

## 2019-11-20 PROCEDURE — 99214 OFFICE O/P EST MOD 30 MIN: CPT | Performed by: FAMILY MEDICINE

## 2020-07-10 ENCOUNTER — OFFICE VISIT (OUTPATIENT)
Dept: FAMILY MEDICINE CLINIC | Age: 75
End: 2020-07-10
Payer: COMMERCIAL

## 2020-07-10 VITALS
RESPIRATION RATE: 16 BRPM | DIASTOLIC BLOOD PRESSURE: 88 MMHG | SYSTOLIC BLOOD PRESSURE: 134 MMHG | TEMPERATURE: 96.8 F | BODY MASS INDEX: 33.07 KG/M2 | OXYGEN SATURATION: 97 % | WEIGHT: 231 LBS | HEART RATE: 70 BPM | HEIGHT: 70 IN

## 2020-07-10 LAB
A/G RATIO: 1.5 (ref 1.1–2.2)
ALBUMIN SERPL-MCNC: 4.4 G/DL (ref 3.4–5)
ALP BLD-CCNC: 87 U/L (ref 40–129)
ALT SERPL-CCNC: 16 U/L (ref 10–40)
ANION GAP SERPL CALCULATED.3IONS-SCNC: 13 MMOL/L (ref 3–16)
AST SERPL-CCNC: 19 U/L (ref 15–37)
BILIRUB SERPL-MCNC: 0.4 MG/DL (ref 0–1)
BUN BLDV-MCNC: 14 MG/DL (ref 7–20)
CALCIUM SERPL-MCNC: 10 MG/DL (ref 8.3–10.6)
CHLORIDE BLD-SCNC: 100 MMOL/L (ref 99–110)
CHOLESTEROL, TOTAL: 195 MG/DL (ref 0–199)
CO2: 25 MMOL/L (ref 21–32)
CREAT SERPL-MCNC: 0.9 MG/DL (ref 0.8–1.3)
GFR AFRICAN AMERICAN: >60
GFR NON-AFRICAN AMERICAN: >60
GLOBULIN: 2.9 G/DL
GLUCOSE BLD-MCNC: 173 MG/DL (ref 70–99)
HDLC SERPL-MCNC: 31 MG/DL (ref 40–60)
LDL CHOLESTEROL CALCULATED: ABNORMAL MG/DL
LDL CHOLESTEROL DIRECT: 125 MG/DL
POTASSIUM SERPL-SCNC: 4.5 MMOL/L (ref 3.5–5.1)
PROSTATE SPECIFIC ANTIGEN: 3.01 NG/ML (ref 0–4)
SODIUM BLD-SCNC: 138 MMOL/L (ref 136–145)
TOTAL PROTEIN: 7.3 G/DL (ref 6.4–8.2)
TRIGL SERPL-MCNC: 310 MG/DL (ref 0–150)
VLDLC SERPL CALC-MCNC: ABNORMAL MG/DL

## 2020-07-10 PROCEDURE — 36415 COLL VENOUS BLD VENIPUNCTURE: CPT | Performed by: FAMILY MEDICINE

## 2020-07-10 PROCEDURE — 99214 OFFICE O/P EST MOD 30 MIN: CPT | Performed by: FAMILY MEDICINE

## 2020-07-10 ASSESSMENT — PATIENT HEALTH QUESTIONNAIRE - PHQ9
SUM OF ALL RESPONSES TO PHQ9 QUESTIONS 1 & 2: 0
SUM OF ALL RESPONSES TO PHQ QUESTIONS 1-9: 0
2. FEELING DOWN, DEPRESSED OR HOPELESS: 0
1. LITTLE INTEREST OR PLEASURE IN DOING THINGS: 0
SUM OF ALL RESPONSES TO PHQ QUESTIONS 1-9: 0

## 2020-07-10 NOTE — PROGRESS NOTES
capsule, TAKE ONE CAPSULE BY MOUTH DAILY, Disp: 90 capsule, Rfl: 2    hydrochlorothiazide (HYDRODIURIL) 25 MG tablet, TAKE ONE TABLET BY MOUTH DAILY, Disp: 90 tablet, Rfl: 2  Allergies   Allergen Reactions    Other Swelling     Pain med that starts with a \"D\". Took it in the 60's when leg was broken. Patient Active Problem List   Diagnosis    Hyperlipidemia    Hypertension    Sleep apnea    Gastroesophageal reflux disease with esophagitis       HPI / ROS: Paddy Fletcher presents for evaluation and management of :    # HTN - shane meds no CP/SOB  Lab Results   Component Value Date     11/20/2019    K 4.8 11/20/2019     11/20/2019    CO2 23 11/20/2019    BUN 13 11/20/2019    CREATININE 0.9 11/20/2019    GLUCOSE 105 11/20/2019    CALCIUM 9.9 11/20/2019       # Hyperlipidemia on statin shane this no myalgias or weakness  Lab Results   Component Value Date    LDLCALC 118 (H) 03/04/2019    LDLDIRECT 146 (H) 09/19/2014      Lab Results   Component Value Date    ALT 18 11/20/2019    AST 22 11/20/2019    ALKPHOS 81 11/20/2019    BILITOT 0.5 11/20/2019      # JANICE using cpap faithfully no issues  # PSA screening history:  Lab Results   Component Value Date    PSA 3.02 03/04/2019    PSA 2.46 11/20/2017    PSA 2.12 09/23/2016     # screen colon cancer - discussed with patient options  cologuard neg APR 2019 next 2021       Wt Readings from Last 3 Encounters:   07/10/20 231 lb (104.8 kg)   11/20/19 234 lb (106.1 kg)   07/09/19 232 lb 6.4 oz (105.4 kg)         A&o  /88   Pulse 70   Temp 96.8 °F (36 °C) (Infrared)   Resp 16   Ht 5' 10\" (1.778 m)   Wt 231 lb (104.8 kg)   SpO2 97%   BMI 33.15 kg/m²   Neck no bruit no TMg  Car reg no MGR  Lungs cta  Ext no edema       Diagnosis Orders   1. Essential hypertension  Comprehensive Metabolic Panel    at goal age > 61 check renal cont meds   2. Mixed hyperlipidemia  Comprehensive Metabolic Panel    Lipid Panel    LFTs lipids on fibrate   3.  Obstructive sleep apnea syndrome      OK cpap continue   4.  Screening for prostate cancer  Psa screening    psa today     Orders Placed This Encounter   Procedures    Comprehensive Metabolic Panel    Lipid Panel     Order Specific Question:   Is Patient Fasting?/# of Hours     Answer:   yes    Psa screening

## 2020-07-13 ENCOUNTER — TELEPHONE (OUTPATIENT)
Dept: FAMILY MEDICINE CLINIC | Age: 75
End: 2020-07-13

## 2020-07-13 DIAGNOSIS — R73.9 ELEVATED BLOOD SUGAR: ICD-10-CM

## 2020-07-13 LAB
ESTIMATED AVERAGE GLUCOSE: 122.6 MG/DL
HBA1C MFR BLD: 5.9 %

## 2020-07-13 NOTE — TELEPHONE ENCOUNTER
PT called in regarding message about his lab results. Informed PT of Dr. Puja Zelaya note. He said that he was supposed to get another call about scheduling another blood test. I did not see any reference to this. Please call back and clarify.      Best call back number: [phone]

## 2020-07-14 ENCOUNTER — TELEPHONE (OUTPATIENT)
Dept: FAMILY MEDICINE CLINIC | Age: 75
End: 2020-07-14

## 2020-07-14 NOTE — TELEPHONE ENCOUNTER
a1c not diabetic    Lab Results   Component Value Date    LABA1C 5.9 07/10/2020     Lab Results   Component Value Date    .6 07/10/2020

## 2020-09-03 RX ORDER — HYDROCHLOROTHIAZIDE 25 MG/1
TABLET ORAL
Qty: 90 TABLET | Refills: 3 | Status: SHIPPED | OUTPATIENT
Start: 2020-09-03 | End: 2021-10-20

## 2020-11-03 ENCOUNTER — OFFICE VISIT (OUTPATIENT)
Dept: FAMILY MEDICINE CLINIC | Age: 75
End: 2020-11-03
Payer: COMMERCIAL

## 2020-11-03 VITALS
HEART RATE: 69 BPM | DIASTOLIC BLOOD PRESSURE: 70 MMHG | BODY MASS INDEX: 32.81 KG/M2 | RESPIRATION RATE: 15 BRPM | HEIGHT: 70 IN | TEMPERATURE: 97.7 F | SYSTOLIC BLOOD PRESSURE: 132 MMHG | OXYGEN SATURATION: 99 % | WEIGHT: 229.2 LBS

## 2020-11-03 LAB
A/G RATIO: 1.6 (ref 1.1–2.2)
ALBUMIN SERPL-MCNC: 4.6 G/DL (ref 3.4–5)
ALP BLD-CCNC: 89 U/L (ref 40–129)
ALT SERPL-CCNC: 18 U/L (ref 10–40)
ANION GAP SERPL CALCULATED.3IONS-SCNC: 14 MMOL/L (ref 3–16)
AST SERPL-CCNC: 20 U/L (ref 15–37)
BILIRUB SERPL-MCNC: 0.6 MG/DL (ref 0–1)
BUN BLDV-MCNC: 14 MG/DL (ref 7–20)
CALCIUM SERPL-MCNC: 10.5 MG/DL (ref 8.3–10.6)
CHLORIDE BLD-SCNC: 106 MMOL/L (ref 99–110)
CO2: 25 MMOL/L (ref 21–32)
CREAT SERPL-MCNC: 1 MG/DL (ref 0.8–1.3)
GFR AFRICAN AMERICAN: >60
GFR NON-AFRICAN AMERICAN: >60
GLOBULIN: 2.8 G/DL
GLUCOSE BLD-MCNC: 121 MG/DL (ref 70–99)
POTASSIUM SERPL-SCNC: 4.8 MMOL/L (ref 3.5–5.1)
SODIUM BLD-SCNC: 145 MMOL/L (ref 136–145)
TOTAL PROTEIN: 7.4 G/DL (ref 6.4–8.2)

## 2020-11-03 PROCEDURE — 36415 COLL VENOUS BLD VENIPUNCTURE: CPT | Performed by: FAMILY MEDICINE

## 2020-11-03 PROCEDURE — 90694 VACC AIIV4 NO PRSRV 0.5ML IM: CPT | Performed by: FAMILY MEDICINE

## 2020-11-03 PROCEDURE — 99214 OFFICE O/P EST MOD 30 MIN: CPT | Performed by: FAMILY MEDICINE

## 2020-11-03 PROCEDURE — G0008 ADMIN INFLUENZA VIRUS VAC: HCPCS | Performed by: FAMILY MEDICINE

## 2020-11-03 RX ORDER — FENOFIBRATE 145 MG/1
TABLET, COATED ORAL
Qty: 90 TABLET | Refills: 3 | Status: SHIPPED | OUTPATIENT
Start: 2020-11-03 | End: 2021-07-07

## 2020-11-03 ASSESSMENT — PATIENT HEALTH QUESTIONNAIRE - PHQ9
2. FEELING DOWN, DEPRESSED OR HOPELESS: 0
SUM OF ALL RESPONSES TO PHQ QUESTIONS 1-9: 0
SUM OF ALL RESPONSES TO PHQ QUESTIONS 1-9: 0
SUM OF ALL RESPONSES TO PHQ9 QUESTIONS 1 & 2: 0
1. LITTLE INTEREST OR PLEASURE IN DOING THINGS: 0
SUM OF ALL RESPONSES TO PHQ QUESTIONS 1-9: 0

## 2020-11-03 NOTE — PROGRESS NOTES
Chief Complaint   Patient presents with    Gastroesophageal Reflux    Hyperlipidemia    Hypertension     No family history on file.   Social History     Socioeconomic History    Marital status:      Spouse name: Not on file    Number of children: Not on file    Years of education: Not on file    Highest education level: Not on file   Occupational History    Not on file   Social Needs    Financial resource strain: Not on file    Food insecurity     Worry: Not on file     Inability: Not on file    Transportation needs     Medical: Not on file     Non-medical: Not on file   Tobacco Use    Smoking status: Former Smoker     Packs/day: 1.50     Years: 25.00     Pack years: 37.50     Last attempt to quit: 3/26/1988     Years since quittin.6    Smokeless tobacco: Never Used   Substance and Sexual Activity    Alcohol use: Yes     Comment: Rare    Drug use: Not on file    Sexual activity: Not on file   Lifestyle    Physical activity     Days per week: Not on file     Minutes per session: Not on file    Stress: Not on file   Relationships    Social connections     Talks on phone: Not on file     Gets together: Not on file     Attends Mandaen service: Not on file     Active member of club or organization: Not on file     Attends meetings of clubs or organizations: Not on file     Relationship status: Not on file    Intimate partner violence     Fear of current or ex partner: Not on file     Emotionally abused: Not on file     Physically abused: Not on file     Forced sexual activity: Not on file   Other Topics Concern    Not on file   Social History Narrative    Not on file       Current Outpatient Medications:     fenofibrate (TRICOR) 145 MG tablet, TAKE ONE TABLET BY MOUTH DAILY, Disp: 90 tablet, Rfl: 3    hydroCHLOROthiazide (HYDRODIURIL) 25 MG tablet, TAKE ONE TABLET BY MOUTH DAILY, Disp: 90 tablet, Rfl: 3    amLODIPine-benazepril (LOTREL) 10-20 MG per capsule, TAKE ONE CAPSULE BY MOUTH DAILY, Disp: 90 capsule, Rfl: 2    metoprolol succinate (TOPROL XL) 200 MG extended release tablet, TAKE ONE TABLET BY MOUTH DAILY, Disp: 90 tablet, Rfl: 3  Allergies   Allergen Reactions    Other Swelling     Pain med that starts with a \"D\". Took it in the 60's when leg was broken. Patient Active Problem List   Diagnosis    Hyperlipidemia    Hypertension    Sleep apnea    Gastroesophageal reflux disease with esophagitis       HPI / ROS: Caro Box presents for evaluation and management of :    # HTN - shane meds no CP/SOB  Lab Results   Component Value Date     07/10/2020    K 4.5 07/10/2020     07/10/2020    CO2 25 07/10/2020    BUN 14 07/10/2020    CREATININE 0.9 07/10/2020    GLUCOSE 173 07/10/2020    CALCIUM 10.0 07/10/2020       # Hyperlipidemia on statin shane this no myalgias or weakness  Lab Results   Component Value Date    LDLCALC see below 07/10/2020    LDLDIRECT 125 (H) 07/10/2020      Lab Results   Component Value Date    ALT 16 07/10/2020    AST 19 07/10/2020    ALKPHOS 87 07/10/2020    BILITOT 0.4 07/10/2020      # GERD - OK on Proton Pump Inhibitor (PPI) per pt; no pain, cough or acid reflux taste    # JANICE using cpap faithfully no issues  # PSA screening history:  Lab Results   Component Value Date    PSA 3.01 07/10/2020    PSA 3.02 03/04/2019    PSA 2.46 11/20/2017     UTD          Wt Readings from Last 3 Encounters:   11/03/20 229 lb 3.2 oz (104 kg)   07/10/20 231 lb (104.8 kg)   11/20/19 234 lb (106.1 kg)         A&o  /70   Pulse 69   Temp 97.7 °F (36.5 °C) (Oral)   Resp 15   Ht 5' 10\" (1.778 m)   Wt 229 lb 3.2 oz (104 kg)   SpO2 99%   BMI 32.89 kg/m²   Neck no bruit no TMg  Car reg no MGR  Lungs cta  Ext no edema       Diagnosis Orders   1. Mixed hyperlipidemia  Comprehensive Metabolic Panel    LFTs on Tricor continue   2. Essential hypertension  Comprehensive Metabolic Panel    at goal cont meds check renal   3.  Obstructive sleep apnea syndrome      cont cpap 4. Gastroesophageal reflux disease with esophagitis without hemorrhage      OK PPI continue   5.  Needs flu shot  INFLUENZA, QUADV, ADJUVANTED, 65 YRS =, IM, PF, PREFILL SYR, 0.5ML (FLUAD)     Orders Placed This Encounter   Procedures    INFLUENZA, QUADV, ADJUVANTED, 65 YRS =, IM, PF, PREFILL SYR, 0.5ML (FLUAD)    Comprehensive Metabolic Panel

## 2020-11-06 RX ORDER — AMLODIPINE BESYLATE AND BENAZEPRIL HYDROCHLORIDE 10; 20 MG/1; MG/1
CAPSULE ORAL
Qty: 90 CAPSULE | Refills: 3 | Status: SHIPPED | OUTPATIENT
Start: 2020-11-06 | End: 2021-07-02 | Stop reason: SDUPTHER

## 2021-01-29 DIAGNOSIS — I10 ESSENTIAL HYPERTENSION: ICD-10-CM

## 2021-01-29 RX ORDER — METOPROLOL SUCCINATE 200 MG/1
TABLET, EXTENDED RELEASE ORAL
Qty: 90 TABLET | Refills: 3 | Status: SHIPPED | OUTPATIENT
Start: 2021-01-29 | End: 2022-02-10

## 2021-03-02 PROBLEM — E88.81 METABOLIC SYNDROME: Status: ACTIVE | Noted: 2021-03-02

## 2021-03-02 PROBLEM — E88.810 METABOLIC SYNDROME: Status: ACTIVE | Noted: 2021-03-02

## 2021-03-02 PROBLEM — E66.811 CLASS 1 OBESITY DUE TO EXCESS CALORIES WITHOUT SERIOUS COMORBIDITY WITH BODY MASS INDEX (BMI) OF 32.0 TO 32.9 IN ADULT: Status: ACTIVE | Noted: 2021-03-02

## 2021-03-02 PROBLEM — E66.09 CLASS 1 OBESITY DUE TO EXCESS CALORIES WITHOUT SERIOUS COMORBIDITY WITH BODY MASS INDEX (BMI) OF 32.0 TO 32.9 IN ADULT: Status: ACTIVE | Noted: 2021-03-02

## 2021-03-02 NOTE — PATIENT INSTRUCTIONS
Low Carb Eating with Intermittent Fasting    target < 100 grams of carb daily; ZERO grained based carbs  Get only carbs from whole fruits - strawberries, raspberries, blackberries, apples, oranges, pineapples, bananas etc.    Intermittent Fasting (\"I. F. \") / Eating Window   Only eat between noon and 8 PM  Water any time  Coffee with cream and no more than 1 teaspoon sugar OK in AM    Google/ YouTube AUTOPHAGY - a primary benefit of IF    Other helpful books and websites  1) Wheat Belly by Carole Suh MD ([email or web address])  2) The Primal Blueprint by Reji Melendrez (www.[employer] - review success stories)  2) Living Paleo For Dummies

## 2021-03-02 NOTE — PROGRESS NOTES
3/3/2021    Kellie Chahal (:  1945) is a 76 y.o. male, here for evaluation of the following chief complaint(s):  Gastroesophageal Reflux, Hyperlipidemia, Hypertension, and Sleep Apnea      ASSESSMENT/PLAN:     Diagnosis Orders   1. Mixed hyperlipidemia  Comprehensive Metabolic Panel    LFTs on fibrate cont; lipids next time   2. Essential hypertension  Comprehensive Metabolic Panel    at goal cont meds check renal   3. Obstructive sleep apnea syndrome      OK cpap cont   4. Gastroesophageal reflux disease with esophagitis without hemorrhage      OK off meds   5. Class 1 obesity due to excess calories without serious comorbidity with body mass index (BMI) of 32.0 to 32.9 in adult      LCIF advised   6. Metabolic syndrome  POCT glycosylated hemoglobin (Hb A1C)    a1c ==   ; LCIF reviewed   7. Screen for colon cancer  POCT Fecal Immunochemical Test (FIT)    FIT cards provided today   8. Prediabetes   POCT glycosylated hemoglobin (Hb A1C)   9. Grief reaction  Ambulatory referral to Psychiatry    long d/w pt re loss of wife (somewhat expected); would like to talk wth Psych NP referred ASHLEY Greene and scehdued       Return in about 4 months (around 7/3/2021) for DM, Hyperlipidemia, JANICE, Metabolic syndrome, Obesity. An electronic signature was used to authenticate this note.     @SIG@    SUBJECTIVE/OBJECTIVE:    HPI / ROS  # Hyperlipidemia on statin shane this no myalgias or weakness  Lab Results   Component Value Date    LDLCALC see below 07/10/2020    LDLDIRECT 125 (H) 07/10/2020      Lab Results   Component Value Date    ALT 18 2020    AST 20 2020    ALKPHOS 89 2020    BILITOT 0.6 2020      # HTN - shane meds no CP/SOB  Lab Results   Component Value Date     2020    K 4.8 2020     2020    CO2 25 2020    BUN 14 2020    CREATININE 1.0 2020    GLUCOSE 121 2020    CALCIUM 10.5 2020       # JANICE using cpap faithfully no issues # GERD - OK on No Meds  per pt; no pain, cough or acid reflux taste\    # PSA screening history:  Lab Results   Component Value Date    PSA 3.01 07/10/2020    PSA 3.02 03/04/2019    PSA 2.46 11/20/2017     # screen colon cancer - discussed with patient options  Fit cards today      # Metabolic Syndrome - check A1C today and reviewed need for lower carb dieting    Lab Results   Component Value Date    LABA1C 6.0 03/03/2021     Lab Results   Component Value Date    .6 07/10/2020     # Obesity reviewed with pt lower carb diet with carb targets to help with insulin effects on weight storage; resources advised  # reviewed recent loss of wife not entirely unexpected given her multiple medical problems        Wt Readings from Last 3 Encounters:   03/03/21 220 lb 6.4 oz (100 kg)   11/03/20 229 lb 3.2 oz (104 kg)   07/10/20 231 lb (104.8 kg)       BP Readings from Last 3 Encounters:   03/03/21 134/70   11/03/20 132/70   07/10/20 134/88       PHYSICAL EXAM  Vitals:    03/03/21 0945   BP: 134/70   Pulse: 86   Resp: 16   Temp: 98 °F (36.7 °C)   TempSrc: Infrared   SpO2: 97%   Weight: 220 lb 6.4 oz (100 kg)   Height: 5' 10\" (1.778 m)     A&o  Neck no TMG no bruit  Car reg no MGR  Lungs cta  Ext no edema  Skin no jaundice  Eyes anicteric\

## 2021-03-03 ENCOUNTER — OFFICE VISIT (OUTPATIENT)
Dept: FAMILY MEDICINE CLINIC | Age: 76
End: 2021-03-03
Payer: COMMERCIAL

## 2021-03-03 VITALS
HEART RATE: 86 BPM | WEIGHT: 220.4 LBS | RESPIRATION RATE: 16 BRPM | HEIGHT: 70 IN | TEMPERATURE: 98 F | DIASTOLIC BLOOD PRESSURE: 70 MMHG | SYSTOLIC BLOOD PRESSURE: 134 MMHG | BODY MASS INDEX: 31.55 KG/M2 | OXYGEN SATURATION: 97 %

## 2021-03-03 DIAGNOSIS — F43.21 GRIEF REACTION: ICD-10-CM

## 2021-03-03 DIAGNOSIS — E66.09 CLASS 1 OBESITY DUE TO EXCESS CALORIES WITHOUT SERIOUS COMORBIDITY WITH BODY MASS INDEX (BMI) OF 32.0 TO 32.9 IN ADULT: ICD-10-CM

## 2021-03-03 DIAGNOSIS — Z12.11 SCREEN FOR COLON CANCER: ICD-10-CM

## 2021-03-03 DIAGNOSIS — E78.2 MIXED HYPERLIPIDEMIA: Primary | ICD-10-CM

## 2021-03-03 DIAGNOSIS — K21.00 GASTROESOPHAGEAL REFLUX DISEASE WITH ESOPHAGITIS WITHOUT HEMORRHAGE: ICD-10-CM

## 2021-03-03 DIAGNOSIS — E88.81 METABOLIC SYNDROME: ICD-10-CM

## 2021-03-03 DIAGNOSIS — I10 ESSENTIAL HYPERTENSION: ICD-10-CM

## 2021-03-03 DIAGNOSIS — R73.03 PREDIABETES: ICD-10-CM

## 2021-03-03 DIAGNOSIS — G47.33 OBSTRUCTIVE SLEEP APNEA SYNDROME: ICD-10-CM

## 2021-03-03 LAB
A/G RATIO: 1.4 (ref 1.1–2.2)
ALBUMIN SERPL-MCNC: 4.3 G/DL (ref 3.4–5)
ALP BLD-CCNC: 64 U/L (ref 40–129)
ALT SERPL-CCNC: 13 U/L (ref 10–40)
ANION GAP SERPL CALCULATED.3IONS-SCNC: 11 MMOL/L (ref 3–16)
AST SERPL-CCNC: 19 U/L (ref 15–37)
BILIRUB SERPL-MCNC: 0.4 MG/DL (ref 0–1)
BUN BLDV-MCNC: 15 MG/DL (ref 7–20)
CALCIUM SERPL-MCNC: 10.4 MG/DL (ref 8.3–10.6)
CHLORIDE BLD-SCNC: 101 MMOL/L (ref 99–110)
CO2: 27 MMOL/L (ref 21–32)
CREAT SERPL-MCNC: 0.9 MG/DL (ref 0.8–1.3)
GFR AFRICAN AMERICAN: >60
GFR NON-AFRICAN AMERICAN: >60
GLOBULIN: 3.1 G/DL
GLUCOSE BLD-MCNC: 194 MG/DL (ref 70–99)
HBA1C MFR BLD: 6 %
POTASSIUM SERPL-SCNC: 4 MMOL/L (ref 3.5–5.1)
SODIUM BLD-SCNC: 139 MMOL/L (ref 136–145)
TOTAL PROTEIN: 7.4 G/DL (ref 6.4–8.2)

## 2021-03-03 PROCEDURE — 83036 HEMOGLOBIN GLYCOSYLATED A1C: CPT | Performed by: FAMILY MEDICINE

## 2021-03-03 PROCEDURE — 36415 COLL VENOUS BLD VENIPUNCTURE: CPT | Performed by: FAMILY MEDICINE

## 2021-03-03 PROCEDURE — 99214 OFFICE O/P EST MOD 30 MIN: CPT | Performed by: FAMILY MEDICINE

## 2021-03-03 ASSESSMENT — PATIENT HEALTH QUESTIONNAIRE - PHQ9
SUM OF ALL RESPONSES TO PHQ QUESTIONS 1-9: 0
SUM OF ALL RESPONSES TO PHQ9 QUESTIONS 1 & 2: 0
2. FEELING DOWN, DEPRESSED OR HOPELESS: 0
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
## 2021-03-05 ENCOUNTER — OFFICE VISIT (OUTPATIENT)
Dept: PSYCHIATRY | Age: 76
End: 2021-03-05
Payer: COMMERCIAL

## 2021-03-05 DIAGNOSIS — F43.21 GRIEF REACTION: Primary | ICD-10-CM

## 2021-03-05 PROCEDURE — 99205 OFFICE O/P NEW HI 60 MIN: CPT | Performed by: NURSE PRACTITIONER

## 2021-03-05 ASSESSMENT — PATIENT HEALTH QUESTIONNAIRE - PHQ9
8. MOVING OR SPEAKING SO SLOWLY THAT OTHER PEOPLE COULD HAVE NOTICED. OR THE OPPOSITE, BEING SO FIGETY OR RESTLESS THAT YOU HAVE BEEN MOVING AROUND A LOT MORE THAN USUAL: 2
9. THOUGHTS THAT YOU WOULD BE BETTER OFF DEAD, OR OF HURTING YOURSELF: 1
7. TROUBLE CONCENTRATING ON THINGS, SUCH AS READING THE NEWSPAPER OR WATCHING TELEVISION: 2
2. FEELING DOWN, DEPRESSED OR HOPELESS: 3
SUM OF ALL RESPONSES TO PHQ9 QUESTIONS 1 & 2: 6
1. LITTLE INTEREST OR PLEASURE IN DOING THINGS: 3
SUM OF ALL RESPONSES TO PHQ QUESTIONS 1-9: 19
6. FEELING BAD ABOUT YOURSELF - OR THAT YOU ARE A FAILURE OR HAVE LET YOURSELF OR YOUR FAMILY DOWN: 3
SUM OF ALL RESPONSES TO PHQ QUESTIONS 1-9: 18

## 2021-03-05 ASSESSMENT — ANXIETY QUESTIONNAIRES
1. FEELING NERVOUS, ANXIOUS, OR ON EDGE: 3-NEARLY EVERY DAY
2. NOT BEING ABLE TO STOP OR CONTROL WORRYING: 1-SEVERAL DAYS
6. BECOMING EASILY ANNOYED OR IRRITABLE: 1-SEVERAL DAYS

## 2021-03-05 NOTE — PATIENT INSTRUCTIONS
Mobile Crisis, Emergency Psychiatric Clinic for patients in need of medication and or a Psychiatrist, temporarily. Ricardo Cormier, [id number]. ([facility]). [phone]      [address] [place] [address]   [phone]  [phone])    National Suicide Prevention Lifeline  (285) 273-TALK (6580)  www.suicidepreventionlifeline. [address] (PES): [phone]  [address] [place] [address]) provides authorization for Crisis Stabilization services in [facility] for both Adults and Children.   Phone: [phone]  Fax: [phone]  [address]    Mobile Crisis Team: [phone]    Text Support  Text 483223 \"connect\" if suicidal for contact via text or phone call

## 2021-03-30 NOTE — PROGRESS NOTES
PSYCHIATRY PROGRESS NOTE    Karol Hodgkins  21      CC:   Chief Complaint   Patient presents with    Other     Grief       HPI:   Karol Hodgkins is a 76 y.o. male with h/o grief reaction who p/t clinic for follow up. Since initial evaluation, patient continuing to struggle with guilt related to the death of his wife. Feels like he could have been a better care taker to her. Feels that he should have recognized the signs that she was declining. Struggling with Worship issues in that he did not take her to confession before she . Wondering if that effected her being able to get into heaven. Spending time with friends playing tennis and golfing but that it met with guilt as well. Does not find much genesis in life right now. Having episodes of anger, threw away his CPAP mask and tubing the other day in anger. Spending time with grandson, daughter. Will be spending Easter with them. This month is his wife's birthday so he feels that it is going to be difficult. Nights continue to be hard but mornings he wakes up looking for her. Struggling with this still. Issues with sleep continue. Visiting her grave weekly. Depressive Sx/Stressors: Continues to endorse feeling lonely but is finding support at [employer], with some closes friends, and family. Finds himself wandering around the house aimlessly, [place] do I do with myself? \", Has a dog that was his wife's that provides comfort especially at night, + feelings of guilt, hopelessness, helpless. Some suicidal thoughts but would never do this because against his [place], family. Rates mood 3-4/10 (10 best). Continues to be tearful. Denies isolating really. Some difficulty with ADL recently. Sleeps 4-5 hours.  Napping throughout day     Psych ROS:      Stefani: Denies insomnia with increased energy, rapid speech, easily distracted or decreased attention, irritability, racing thoughts, expansive mood, increase in energy and goal directed behavior, grandiosity, flight of ideas     Anxiety: Denies rates /10 (10 worst); constant worry, irritability, sleep disruption, somatic complaints (headaches, trembling, nausea, dyspnea, diaphoreis, muscle tension), restlessness, fatigue; fear of doing/saying wrong things, fear of judgement, avoidant of social situations     OCD:  Denies repetitive actions or rituals, excessive hand washing, contamination fears, need for symmetry, violent thoughts, hoarding, fear of being harmed, mental or verbal repetition of words or phrases and counting     Panic: Denies Shortness of breath, dizziness, diaphoresis, trembling, palpitations, feeing of choking, nausea, feeling outside of self, numbness, chills, hot flashes, chest discomfort and fear of dying     Phobias:  Denies shortness of breath, trembling, increased heart rate, panic, dread, terror, horror, awareness that fear is out of proportion to the actual threat, overwhelming urge to escape the situation and intense measures taken to steer clear of the feared object or situation     Psychosis: Denies A/VH, paranoia, delusions     ADHD: Denies     PTSD: nightmares, flashbacks, hypervigilance, easily startled, decreased sleep, reliving the event, avoiding situations that remind you of trauma, physical and mental paralysis when reminded of the experience, same despair, easily angry or irritable, trouble concentrating, fear for safety,  Numbness of emotions, feeling of detachment     Eating disorders: Denies    MADISON 7 SCORE 4/2/2021 3/5/2021   MADISON-7 Total Score 6 11     Interpretation of MADISON-7 score: 5-9 = mild anxiety, 10-14 = moderate anxiety,   15+ = severe anxiety. Recommend referral to behavioral health for scores 10 or greater.     PHQ-9 Total Score: 14 (4/2/2021  8:31 AM)  Thoughts that you would be better off dead, or of hurting yourself in some way: 1 (4/2/2021  8:31 AM)    Interpretation of PHQ-9 score:  1-4 = minimal depression, 5-9 = mild depression,   10-14 = moderate depression; 15-19 = moderately severe depression, 20-27 = severe depression      Past Psychiatric History:               Prior hospitalizations: Denies              Prior diagnoses: Denies              Outpatient Treatment: Denies                          Psychiatrist: Denies                          Therapist: Denies              Suicide Attempts: Denies              Hx SH:  Denies     Past Psychopharmacologic Trials (including response/reactions):  Denies    Past Medical/Surgical History:   Past Medical History:   Diagnosis Date    Hyperlipidemia     Hypertension     Special screening for malignant neoplasm of prostate      Past Surgical History:   Procedure Laterality Date    APPENDECTOMY  1984       No family history on file. PCP: Brianda Lawrence MD      Allergies: Allergies   Allergen Reactions    Other Swelling     Pain med that starts with a \"D\". Took it in the 60's when leg was broken. Current Medications:   Current Outpatient Medications on File Prior to Visit   Medication Sig Dispense Refill    metoprolol succinate (TOPROL XL) 200 MG extended release tablet TAKE ONE TABLET BY MOUTH DAILY 90 tablet 3    amLODIPine-benazepril (LOTREL) 10-20 MG per capsule TAKE ONE CAPSULE BY MOUTH DAILY 90 capsule 3    fenofibrate (TRICOR) 145 MG tablet TAKE ONE TABLET BY MOUTH DAILY 90 tablet 3    hydroCHLOROthiazide (HYDRODIURIL) 25 MG tablet TAKE ONE TABLET BY MOUTH DAILY 90 tablet 3     No current facility-administered medications on file prior to visit. Controlled Substance Monitoring:  PDMP Monitoring:    Last PDMP Rolando as Reviewed [facility]):  Review User Review Instant Review Result   LAINEY GAMBLE 4/2/2021  8:18 AM Reviewed PDMP [1]       Urine Drug Screenings (1 yr)     No resulted procedures found.         Medication Contract and Consent for Opioid Use Documents Filed      No documents found                OBJECTIVE:    Wt Readings from Last 3 Encounters:   03/03/21 220 lb 6.4 oz (100 kg)   11/03/20 229 lb 3.2 oz (104 kg)   07/10/20 231 lb (104.8 kg)     ROS: Denies trouble with fever, rash, headache, vision changes, chest pain, shortness of breath, nausea, extremity pain, weakness, dysuria.      Mental Status Exam:     Appearance    Tearful, alert, cooperative, moderate distress  Muscle strength/tone: no atrophy or abnormal movements  Gait/station: normal  Impulsive behavior No  Speech    spontaneous, normal rate and normal volume  Mood    Depressed  [place]  Affect    depressed affect Congruent to thought content and mood  Thought Content    hopelessness, helplessness and excessive guilt, no delusions voiced  Thought Process    linear   Associations    logical connections  Perceptions: denies AH/VH, does not appear preoccupied with the internal environment, no delusions  Insight    Good  Judgment    Intact  Orientation    oriented to person, place, time, and general circumstances  Memory    recent and remote memory intact  Attention/Concentration    intact  Ability to understand instructions Yes  Ability to respond meaningfully Yes  Language:  Fluent   Fund of Knowledge/Intelligence:  Average  SI:   no suicidal ideation  HI: Denies HI    Labs:     Office Visit on 03/03/2021   Component Date Value    Hemoglobin A1C 03/03/2021 6.0     Sodium 03/03/2021 139     Potassium 03/03/2021 4.0     Chloride 03/03/2021 101     CO2 03/03/2021 27     Anion Gap 03/03/2021 11     Glucose 03/03/2021 194*    BUN 03/03/2021 15     CREATININE 03/03/2021 0.9     GFR Non- 03/03/2021 >60     GFR  03/03/2021 >60     Calcium 03/03/2021 10.4     Total Protein 03/03/2021 7.4     Albumin 03/03/2021 4.3     Albumin/Globulin Ratio 03/03/2021 1.4     Total Bilirubin 03/03/2021 0.4     Alkaline Phosphatase 03/03/2021 64     ALT 03/03/2021 13     AST 03/03/2021 19     Globulin 03/03/2021 3.1    Office Visit on 11/03/2020   Component Date Value    Sodium 11/03/2020 145     Potassium 11/03/2020 4.8     Chloride 11/03/2020 106     CO2 11/03/2020 25     Anion Gap 11/03/2020 14     Glucose 11/03/2020 121*    BUN 11/03/2020 14     CREATININE 11/03/2020 1.0     GFR Non- 11/03/2020 >60     GFR  11/03/2020 >60     Calcium 11/03/2020 10.5     Total Protein 11/03/2020 7.4     Albumin 11/03/2020 4.6     Albumin/Globulin Ratio 11/03/2020 1.6     Total Bilirubin 11/03/2020 0.6     Alkaline Phosphatase 11/03/2020 89     ALT 11/03/2020 18     AST 11/03/2020 20     Globulin 11/03/2020 2.8        Last Drug screen:  No results found for: Lina Numbers, LABBENZ, COCAIMETSCRU, THC, MDMA, LABMETH, OPIATESCREENURINE, OXTCOSU, PHENCYCLIDINESCREENURINE, PROPOXYPHENE, METAMPU      Imaging: no head imaging on file      ASSESSMENT AND PLAN     Diagnosis Orders   1. Grief reaction         1. Safety: NO Imminent risk of danger to/self/others based on the factors considered below. Appropriate for outpatient level of care. Safety plan includes: 911, PES, hotlines, and interventions discussed today.      Risk factors: Age <25 or >49, male gender, depressed mood, suicidal ideation, social isolation, no outpatient services in place,and no collateral information to support safety.     Protective factors: does not have lethal plan, does not have access to guns or weapons, patient is comfort for safety, no prior suicide attempts, no family h/o suicide, no substance abuse, patient has social or family support, no active psychosis or cognitive dysfunction, physically healthy, compliant with recommended medications, and patient is future oriented.        2. Psychiatric Plan     Grief Reaction: Patient is struggling tremendously with the passing of his wife. Struggling with depressive sx along with extreme guilt. Does not want to try medications at this point but considering.      -Trial SSRI for depressive sx in future. Possibly Mirtazapine for sleep issues.

## 2021-04-02 ENCOUNTER — OFFICE VISIT (OUTPATIENT)
Dept: PSYCHIATRY | Age: 76
End: 2021-04-02
Payer: COMMERCIAL

## 2021-04-02 DIAGNOSIS — F43.21 GRIEF REACTION: Primary | ICD-10-CM

## 2021-04-02 PROCEDURE — 99214 OFFICE O/P EST MOD 30 MIN: CPT | Performed by: NURSE PRACTITIONER

## 2021-04-02 ASSESSMENT — PATIENT HEALTH QUESTIONNAIRE - PHQ9
7. TROUBLE CONCENTRATING ON THINGS, SUCH AS READING THE NEWSPAPER OR WATCHING TELEVISION: 1
SUM OF ALL RESPONSES TO PHQ QUESTIONS 1-9: 13
5. POOR APPETITE OR OVEREATING: 1
1. LITTLE INTEREST OR PLEASURE IN DOING THINGS: 2
SUM OF ALL RESPONSES TO PHQ QUESTIONS 1-9: 14
9. THOUGHTS THAT YOU WOULD BE BETTER OFF DEAD, OR OF HURTING YOURSELF: 1
2. FEELING DOWN, DEPRESSED OR HOPELESS: 2
3. TROUBLE FALLING OR STAYING ASLEEP: 3

## 2021-04-02 ASSESSMENT — ANXIETY QUESTIONNAIRES
6. BECOMING EASILY ANNOYED OR IRRITABLE: 1-SEVERAL DAYS
5. BEING SO RESTLESS THAT IT IS HARD TO SIT STILL: 2-OVER HALF THE DAYS
7. FEELING AFRAID AS IF SOMETHING AWFUL MIGHT HAPPEN: 0-NOT AT ALL
GAD7 TOTAL SCORE: 6
4. TROUBLE RELAXING: 1-SEVERAL DAYS

## 2021-06-15 ENCOUNTER — TELEPHONE (OUTPATIENT)
Dept: FAMILY MEDICINE CLINIC | Age: 76
End: 2021-06-15

## 2021-07-01 NOTE — PATIENT INSTRUCTIONS
Low Carb Eating with Intermittent Fasting    target < 100 grams of carb daily; ZERO grained based carbs  Get only carbs from whole fruits - strawberries, raspberries, blackberries, apples, oranges, pineapples, bananas etc.    Intermittent Fasting (\"I. F. \") / Eating Window   Only eat between noon and 8 PM  Water any time  Coffee with cream and no more than 1 teaspoon sugar OK in AM    Google/ YouTube AUTOPHAGY - a primary benefit of IF    Other helpful books and websites  1) Wheat Belly by Bee Russell MD (www.[employer]. [employer]estor[employer])  2) The Primal Blueprint by Lani Alanis (www.[employer] - review success stories)  2) Living Paleo For Dummies

## 2021-07-01 NOTE — PROGRESS NOTES
2021    Krish Rod (:  1945) is a 76 y.o. male, here for evaluation of the following chief complaint(s):  Diabetes (DM, HTN f/u)      ASSESSMENT/PLAN:     Diagnosis Orders   1. Mixed hyperlipidemia  Lipid Panel    Comprehensive Metabolic Panel    cmp lipids on fibrate continuea   2. Essential hypertension  Comprehensive Metabolic Panel    amLODIPine-benazepril (LOTREL) 10-20 MG per capsule    at goal cont med check renal   3. Metabolic syndrome  POCT glycosylated hemoglobin (Hb A1C)    Comprehensive Metabolic Panel   4. Obstructive sleep apnea syndrome      OK cpap continue   5. Screening PSA (prostate specific antigen)  PSA screening   6. Prediabetes   POCT glycosylated hemoglobin (Hb A1C)   7. Screen for colon cancer      FIT cards reminded       Return in about 4 months (around 2021) for HTN, Hyperlipidemia, JANICE, Metabolic syndrome. An electronic signature was used to authenticate this note.     @SIG@    SUBJECTIVE/OBJECTIVE:  (NOTE : prior results listed below reviewed at this visit to assist in medical decision making.)    HPI / ROS    # Metabolic Syndrome - check A1C today and reviewed need for lower carb dieting  Lab Results   Component Value Date    LABA1C 6.0 2021     Lab Results   Component Value Date    .6 07/10/2020       # Hyperlipidemia on statin shane this no myalgias or weakness LIPIDS DUE  Lab Results   Component Value Date    LDLCALC see below 07/10/2020    LDLDIRECT 125 (H) 07/10/2020      Lab Results   Component Value Date    ALT 13 2021    AST 19 2021    ALKPHOS 64 2021    BILITOT 0.4 2021      # HTN - shane meds no CP/SOB  Lab Results   Component Value Date     2021    K 4.0 2021     2021    CO2 27 2021    BUN 15 2021    CREATININE 0.9 2021    GLUCOSE 194 2021    CALCIUM 10.4 2021       # JANICE using cpap faithfully no issues    # PSA screening history: PSA due  Lab Results Component Value Date    PSA 3.01 07/10/2020    PSA 3.02 03/04/2019    PSA 2.46 11/20/2017     # screen colon cancer - screening status discussed with patient; reviewed today prior testing FIT cards provided [facility] 2021 reminded        Wt Readings from Last 3 Encounters:   07/02/21 220 lb (99.8 kg)   03/03/21 220 lb 6.4 oz (100 kg)   11/03/20 229 lb 3.2 oz (104 kg)       BP Readings from Last 3 Encounters:   07/02/21 (!) 140/82   03/03/21 134/70   11/03/20 132/70       PHYSICAL EXAM  Vitals:    07/02/21 0947   BP: (!) 140/82   Site: Left Upper Arm   Position: Sitting   Cuff Size: Large Adult   Weight: 220 lb (99.8 kg)     A&o  Neck no TMG no bruit  Car reg no MGR  Lungs cta  Ext no edema  Skin no jaundice  Eyes anicteric

## 2021-07-02 ENCOUNTER — OFFICE VISIT (OUTPATIENT)
Dept: PSYCHIATRY | Age: 76
End: 2021-07-02
Payer: COMMERCIAL

## 2021-07-02 ENCOUNTER — OFFICE VISIT (OUTPATIENT)
Dept: FAMILY MEDICINE CLINIC | Age: 76
End: 2021-07-02
Payer: COMMERCIAL

## 2021-07-02 VITALS — DIASTOLIC BLOOD PRESSURE: 82 MMHG | BODY MASS INDEX: 31.57 KG/M2 | SYSTOLIC BLOOD PRESSURE: 140 MMHG | WEIGHT: 220 LBS

## 2021-07-02 DIAGNOSIS — G47.33 OBSTRUCTIVE SLEEP APNEA SYNDROME: ICD-10-CM

## 2021-07-02 DIAGNOSIS — E78.2 MIXED HYPERLIPIDEMIA: Primary | ICD-10-CM

## 2021-07-02 DIAGNOSIS — E88.81 METABOLIC SYNDROME: ICD-10-CM

## 2021-07-02 DIAGNOSIS — F43.21 GRIEF REACTION: Primary | ICD-10-CM

## 2021-07-02 DIAGNOSIS — R73.03 PREDIABETES: ICD-10-CM

## 2021-07-02 DIAGNOSIS — I10 ESSENTIAL HYPERTENSION: ICD-10-CM

## 2021-07-02 DIAGNOSIS — Z12.11 SCREEN FOR COLON CANCER: ICD-10-CM

## 2021-07-02 DIAGNOSIS — Z12.5 SCREENING PSA (PROSTATE SPECIFIC ANTIGEN): ICD-10-CM

## 2021-07-02 LAB
A/G RATIO: 1.6 (ref 1.1–2.2)
ALBUMIN SERPL-MCNC: 4.7 G/DL (ref 3.4–5)
ALP BLD-CCNC: 100 U/L (ref 40–129)
ALT SERPL-CCNC: 17 U/L (ref 10–40)
ANION GAP SERPL CALCULATED.3IONS-SCNC: 14 MMOL/L (ref 3–16)
AST SERPL-CCNC: 21 U/L (ref 15–37)
BILIRUB SERPL-MCNC: 0.3 MG/DL (ref 0–1)
BUN BLDV-MCNC: 15 MG/DL (ref 7–20)
CALCIUM SERPL-MCNC: 9.7 MG/DL (ref 8.3–10.6)
CHLORIDE BLD-SCNC: 102 MMOL/L (ref 99–110)
CHOLESTEROL, TOTAL: 211 MG/DL (ref 0–199)
CO2: 22 MMOL/L (ref 21–32)
CREAT SERPL-MCNC: 1 MG/DL (ref 0.8–1.3)
GFR AFRICAN AMERICAN: >60
GFR NON-AFRICAN AMERICAN: >60
GLOBULIN: 3 G/DL
GLUCOSE BLD-MCNC: 196 MG/DL (ref 70–99)
HBA1C MFR BLD: 6.5 %
HDLC SERPL-MCNC: 31 MG/DL (ref 40–60)
LDL CHOLESTEROL CALCULATED: ABNORMAL MG/DL
LDL CHOLESTEROL DIRECT: 123 MG/DL
POTASSIUM SERPL-SCNC: 4.4 MMOL/L (ref 3.5–5.1)
PROSTATE SPECIFIC ANTIGEN: 2.82 NG/ML (ref 0–4)
SODIUM BLD-SCNC: 138 MMOL/L (ref 136–145)
TOTAL PROTEIN: 7.7 G/DL (ref 6.4–8.2)
TRIGL SERPL-MCNC: 531 MG/DL (ref 0–150)
VLDLC SERPL CALC-MCNC: ABNORMAL MG/DL

## 2021-07-02 PROCEDURE — 99214 OFFICE O/P EST MOD 30 MIN: CPT | Performed by: FAMILY MEDICINE

## 2021-07-02 PROCEDURE — 99213 OFFICE O/P EST LOW 20 MIN: CPT | Performed by: NURSE PRACTITIONER

## 2021-07-02 PROCEDURE — 36415 COLL VENOUS BLD VENIPUNCTURE: CPT | Performed by: FAMILY MEDICINE

## 2021-07-02 PROCEDURE — 83036 HEMOGLOBIN GLYCOSYLATED A1C: CPT | Performed by: FAMILY MEDICINE

## 2021-07-02 RX ORDER — ESCITALOPRAM OXALATE 10 MG/1
10 TABLET ORAL DAILY
Qty: 30 TABLET | Refills: 3 | Status: SHIPPED | OUTPATIENT
Start: 2021-07-02 | End: 2022-08-19

## 2021-07-02 RX ORDER — AMLODIPINE BESYLATE AND BENAZEPRIL HYDROCHLORIDE 10; 20 MG/1; MG/1
CAPSULE ORAL
Qty: 90 CAPSULE | Refills: 3 | Status: SHIPPED | OUTPATIENT
Start: 2021-07-02 | End: 2022-08-19 | Stop reason: SDUPTHER

## 2021-07-02 NOTE — PROGRESS NOTES
history. PCP: Lisa Grover MD      Allergies: Allergies   Allergen Reactions    Other Swelling     Pain med that starts with a \"D\". Took it in the 60's when leg was broken. Current Medications:   Current Outpatient Medications on File Prior to Visit   Medication Sig Dispense Refill    amLODIPine-benazepril (LOTREL) 10-20 MG per capsule TAKE ONE CAPSULE BY MOUTH DAILY 90 capsule 3    metoprolol succinate (TOPROL XL) 200 MG extended release tablet TAKE ONE TABLET BY MOUTH DAILY 90 tablet 3    fenofibrate (TRICOR) 145 MG tablet TAKE ONE TABLET BY MOUTH DAILY 90 tablet 3    hydroCHLOROthiazide (HYDRODIURIL) 25 MG tablet TAKE ONE TABLET BY MOUTH DAILY 90 tablet 3     No current facility-administered medications on file prior to visit. OBJECTIVE:  Vitals: Wt Readings from Last 3 Encounters:   07/02/21 220 lb (99.8 kg)   03/03/21 220 lb 6.4 oz (100 kg)   11/03/20 229 lb 3.2 oz (104 kg)       ROS: Denies trouble with fever, rash, headache, vision changes, chest pain, shortness of breath, nausea, extremity pain, weakness, dysuria.      Mental Status Exam:     Appearance    Fatigued, alert, cooperative  Muscle strength/tone: no atrophy or abnormal movements  Gait/station: normal  Impulsive behavior No  Speech    Soft volume and spontaneous  Mood    Depressed  Emptiness  [place]  Affect    depressed affect Congruent to thought content and mood  Thought Content    hopelessness, helplessness and excessive guilt, no delusions voiced  Thought Process    linear   Associations    logical connections  Perceptions: denies AH/VH, does not appear preoccupied with the internal environment, no delusions  [address]  Orientation    oriented to person, place, time, and general circumstances  Memory    recent and remote memory intact  Attention/Concentration    intact  Ability to understand instructions Yes  Ability to respond meaningfully Yes  Language:  [employer] of medications, and patient is future oriented.        2. Psychiatric Plan     Grief Reaction:     -Trial Lexapro 10 mg once daily for management of depressive sx, grief. Side effects discussed. -Referral placed for ANIA Springer Manish Cony: reviewed in Epic, up to date    3. Medical  -Following with Helen Jacinto MD    4. Substance   -No active issues. 5. RTC - UZIEL Nicole, [address] [place] [address] Nurse Practitioner    On this date 7/2/2021 I have spent 25 minutes reviewing previous notes, test results and face to face with the patient discussing the diagnosis and importance of compliance with the treatment plan as well as documenting on the day of the visit.

## 2021-07-07 RX ORDER — FENOFIBRATE 145 MG/1
TABLET, COATED ORAL
Qty: 90 TABLET | Refills: 3 | Status: SHIPPED | OUTPATIENT
Start: 2021-07-07 | End: 2022-08-15

## 2021-07-09 ENCOUNTER — OFFICE VISIT (OUTPATIENT)
Dept: PSYCHOLOGY | Age: 76
End: 2021-07-09
Payer: COMMERCIAL

## 2021-07-09 DIAGNOSIS — F43.21 GRIEF: Primary | ICD-10-CM

## 2021-07-09 PROCEDURE — 90791 PSYCH DIAGNOSTIC EVALUATION: CPT | Performed by: SOCIAL WORKER

## 2021-07-09 ASSESSMENT — PATIENT HEALTH QUESTIONNAIRE - PHQ9
SUM OF ALL RESPONSES TO PHQ QUESTIONS 1-9: 21
2. FEELING DOWN, DEPRESSED OR HOPELESS: 2
1. LITTLE INTEREST OR PLEASURE IN DOING THINGS: 3
7. TROUBLE CONCENTRATING ON THINGS, SUCH AS READING THE NEWSPAPER OR WATCHING TELEVISION: 2
6. FEELING BAD ABOUT YOURSELF - OR THAT YOU ARE A FAILURE OR HAVE LET YOURSELF OR YOUR FAMILY DOWN: 3
SUM OF ALL RESPONSES TO PHQ9 QUESTIONS 1 & 2: 5
8. MOVING OR SPEAKING SO SLOWLY THAT OTHER PEOPLE COULD HAVE NOTICED. OR THE OPPOSITE, BEING SO FIGETY OR RESTLESS THAT YOU HAVE BEEN MOVING AROUND A LOT MORE THAN USUAL: 2
SUM OF ALL RESPONSES TO PHQ QUESTIONS 1-9: 21
3. TROUBLE FALLING OR STAYING ASLEEP: 2
9. THOUGHTS THAT YOU WOULD BE BETTER OFF DEAD, OR OF HURTING YOURSELF: 2
SUM OF ALL RESPONSES TO PHQ QUESTIONS 1-9: 19
4. FEELING TIRED OR HAVING LITTLE ENERGY: 3
5. POOR APPETITE OR OVEREATING: 2

## 2021-07-09 ASSESSMENT — ANXIETY QUESTIONNAIRES
5. BEING SO RESTLESS THAT IT IS HARD TO SIT STILL: 1
4. TROUBLE RELAXING: 3
7. FEELING AFRAID AS IF SOMETHING AWFUL MIGHT HAPPEN: 2
2. NOT BEING ABLE TO STOP OR CONTROL WORRYING: 3
3. WORRYING TOO MUCH ABOUT DIFFERENT THINGS: 2
6. BECOMING EASILY ANNOYED OR IRRITABLE: 2
1. FEELING NERVOUS, ANXIOUS, OR ON EDGE: 1
GAD7 TOTAL SCORE: 14

## 2021-07-09 ASSESSMENT — COLUMBIA-SUICIDE SEVERITY RATING SCALE - C-SSRS
6. HAVE YOU EVER DONE ANYTHING, STARTED TO DO ANYTHING, OR PREPARED TO DO ANYTHING TO END YOUR LIFE?: NO
1. WITHIN THE PAST MONTH, HAVE YOU WISHED YOU WERE DEAD OR WISHED YOU COULD GO TO SLEEP AND NOT WAKE UP?: YES
2. HAVE YOU ACTUALLY HAD ANY THOUGHTS OF KILLING YOURSELF?: NO

## 2021-07-09 NOTE — PROGRESS NOTES
Behavioral Health Consultation  [address]  7/9/2021   8:19 AM EDT      Time spent with Patient: 40 minutes  This is patient's first [facility] appointment. Reason for Consult:    Chief Complaint   Patient presents with    Depression     Referring Provider: Jose C Maravilla MD  [address] [place] [address]    Patient provided informed consent for the behavioral health program. Discussed with patient model of service to include the limits of confidentiality (i.e. abuse reporting, suicide intervention, etc.) and short-term intervention focused approach. Pt indicated understanding. Feedback given to PCP. S:  Patient presents with grief symptoms, stating his wife passed away 5 months ago. They were  for 51 years. Pt reports passive SI \"I don't see a lot to live for. \" Pt endorses passive plan \"don't take medications,\" denies intent. Protective factors include: \"I don't have the nerve,\" and goes against Sabianist beliefs, \"It would be wrong. \" Pt endorses symptoms of depression including excessive guilt, sad mood, passive SI, anhedonia, low energy, low motivation, negative thoughts of self. Pt processed around his wife's declining health and end of life. Pt shared his feelings of sadness, guilt, hopelessness related to her death. Symptoms have been present for 5 months. Patient finds relief from nothing at this time. Goals for treatment include symptom relief. Pt reports that he will be traveling with his daughter next week. Patient lives alone. Daily caffeine use coffee. Denies cigarette use, denies alcohol use, denies illegal drug use. Patient describes poor sleep pattern, goes to bed late and wakes up early. Patient describes daughter as social support. Patient is Sabianist. Family history of psychiatric illness: unknown.  COVID vaccination status: Yes.    O:  MSE:    Appearance: good hygiene   Attitude: cooperative and friendly  Consciousness: alert  Orientation: oriented to person, place, time, general circumstance  Memory    recent and remote memory intact   Attention/Concentration    intact  Psychomotor Activity:normal  Eye Contact: normal  Speech: normal rate and volume, well-articulated  Mood    Depressed, guilty  Affect    depressed affect  Perception: within normal limits  Thought Content: within normal limits  Thought Process: logical, coherent and goal-directed  Associations    logical connections  Insight: good  Judgment    Intact  Appetite normal  Sleep disturbance Yes  Fatigue Yes  Loss of pleasure Yes  Impulsive behavior No  Morbid Ideation: passive thoughts of death  Suicide Assessment: suicidal ideation without plan or intent  Homicidal Ideation: no      History:    Medications:   Current Outpatient Medications   Medication Sig Dispense Refill    fenofibrate (TRICOR) 145 MG tablet TAKE ONE TABLET BY MOUTH DAILY 90 tablet 3    amLODIPine-benazepril (LOTREL) 10-20 MG per capsule TAKE ONE CAPSULE BY MOUTH DAILY 90 capsule 3    escitalopram (LEXAPRO) 10 MG tablet Take 1 tablet by mouth daily 30 tablet 3    metoprolol succinate (TOPROL XL) 200 MG extended release tablet TAKE ONE TABLET BY MOUTH DAILY 90 tablet 3    hydroCHLOROthiazide (HYDRODIURIL) 25 MG tablet TAKE ONE TABLET BY MOUTH DAILY 90 tablet 3     No current facility-administered medications for this visit.      Social History:   Social History     Socioeconomic History    Marital status:      Spouse name: Not on file    Number of children: Not on file    Years of education: Not on file    Highest education level: Not on file   Occupational History    Not on file   Tobacco Use    Smoking status: Former Smoker     Packs/day: 1.50     Years: 25.00     Pack years: 45.53     Quit date: 3/26/1988     Years since quittin.3    Smokeless tobacco: Never Used   Substance and Sexual Activity    Alcohol use: Yes     Comment: Rare    Drug use: Not on file    Sexual activity: Not on file   Other Topics Concern    Not on file   Social History Narrative    Not on file     Social Determinants of Health     Financial Resource Strain:     Difficulty of Paying Living Expenses:    Food Insecurity:     Worried About Running Out of Food in the Last Year:     [address] in the Last Year:    Transportation Needs:     Lack of Transportation (Medical):  Lack of Transportation (Non-Medical):    Physical Activity:     Days of Exercise per Week:     Minutes of Exercise per Session:    Stress:     Feeling of Stress :    Social Connections:     Frequency of Communication with Friends and Family:     Frequency of Social Gatherings with Friends and Family:     Attends [employer] Services:     Active Member of Clubs or Organizations:     Attends Club or Organization Meetings:     Marital Status:    Intimate Partner Violence:     Fear of Current or Ex-Partner:     Emotionally Abused:     Physically Abused:     Sexually Abused:      TOBACCO:   reports that he quit smoking about 33 years ago. He has a 37.50 pack-year smoking history. He has never used smokeless tobacco.  ETOH:   reports current alcohol use. Family History:   No family history on file. A:  Patient endorses depressed mood, with passive SI, passive plan \"don't take medications\", no intent. Pt's Buddhism is a protective factor. Denies HI, with good insight and motivation. Pt is in depressed stage of grief. Pt was encouraged to continue to engage in activities in order to manage depression/grief related to his wife's death. Discussed taking medications for self-care, and normalized his grief reaction. Pt was agreeable to make attempts to take medications regularly and attempt to engage in some daily routines to manage depression and prevent further mental health decline.      PHQ Scores 7/9/2021 4/2/2021 3/5/2021 3/3/2021 11/3/2020 7/10/2020 3/4/2019   PHQ2 Score 5 4 6 0 0 0 0   PHQ9 Score 21 14 19 0 0 0 0     Interpretation of Total Score

## 2021-07-09 NOTE — PATIENT INSTRUCTIONS
1. Review handout on grief. 2. Take medications as prescribed. 3. F/u with Fernanda Gilliam in 2 weeks. Bereavement, Grief & Mourning    Bereavement is the state of having lost a significant other to death. Grief is the personal response to the loss. Mourning is the public expression of that loss. What is Normal Grief? Grief reactions vary depending on who we are, who we lost, our relationship with that person, the circumstances around their passing, and how much their loss affects our day-to-day functioning. Different people may express grief differently and you may even have different grief responses between one loss and another. Reactions to grief and loss include not just emotional symptoms, but also behavioral and physical symptoms. These reactions can often change over time. All are normal for a short period of time. Emotional Behavioral Physical   Shock, denial,                   numbness Crying unexpectedly Exhaustion/Fatigue      Sadness, anxiety, guilt,       fear Sleep changes (increase or decrease) Decreased energy        Anger (at others or        God), Not eating/Weight changes Memory problems        Irritability, frustration Withdrawing from others Stomach and intestinal upset    Restlessness, difficulty concentrating, trouble making decisions Pain and headaches     Symptoms that are not normal and may signal the need to talk to a professional include:  Use of drugs, alcohol, violence, and thoughts of killing oneself. The duration of grief varies from person to person. Current research shows that the average recovery time is 18 to 24 months. Also, grief reactions can be stronger around anniversary or other significant dates, such as the anniversary of the persons death, birthdays, and holidays. The Stages of Grief  Grief therapists often describe stages of grief outlined by the research of Dr. Lauren Cowart. These stages do not always go in order.   You may move back and forth among some of the stages and may even skip some of them. These stages are meant as a guide to help you understand your reactions and those of others who are grieving.  - Denial:  Denial (not acknowledging the loss) can help contain the shock of loss. Denial can act as a safety mechanism to block out grief until we are ready to handle it. - Sadness and depression:  Deep, intense grief and mourning appear during this stage. When the full understanding of our loss comes, it can seem overwhelming. During this stage, you may cry often and unexpectedly. You may not want to be around people or to do things that you normally enjoy. During this stage, it is best to remain as active as possible and to seek supportive people who will allow you to say what you need to or to cry when you need to. It is important to allow yourself to work through your full range and experience of emotions. - Anger:  Rage and anger can be intense toward the person who , toward friends and relatives, and even toward God. It is important to have an outlet to release anger through activities such as exercise, hobbies, or through therapy. Guilt, shame, and blame are feelings that need to be addressed, especially if it is toward you. - Acceptance: This stage includes coming to terms with the loss. It does not mean that you have found the answers to your questions or that you stop thinking about the person who is gone. It does signify a reinvestment in life and a willingness to readjust to your new circumstances while carrying the memory of your loved one with you. How to Help Yourself  1. Give yourself time to grieve. It is normal and important to express your grief and to work through the concerns that arise for you at this time. Stuffing your feelings may not be helpful and may delay or prolong your grief. 2. Find supportive people to reach out to during your grief.   This is the time when the support of others may be the most helpful. Dont be afraid to tell them how they can best help, even if it means just listening. It is often very helpful to talk about your loss with people who will allow you to express your emotions. 3. Take care of your health. Often after a loss, we stop doing the things we need to for health care, such as exercising, eating correctly, keeping Dr. appointments, or taking prescribed medications. If you are on a health care regimen, it is important to continue to adhere to your treatment. 4. Postpone major life changes. Give yourself time to adjust to your loss before making plans to change jobs, move or sell your home, remarry, etc.  Grief can sometimes cloud your judgment and ability to make decisions. 5. Consider keeping a journal.  It is often helpful to write or tell the story of your loss and what it means to you as a way to work through your feelings. 6. Participate in activities. Staying active through exercise, enjoyable activities, outings with supportive others, or even starting new hobbies can help us get through tough times while providing opportunities for constructive development and use of energy. 7. Find a way to memorialize your loved one. Planting a tree or garden in the name of your loved one, dedicating a work to their memory, contributing to a jeferson in their name, and other such activities can be helpful. 8. Consider joining grief-support groups or contacting a grief counselor for additional support and help. Remember that depressive symptoms (feeling sad) are a fundamental part of normal bereavement. Staying active and finding support from others can help you to work through the grief process. References  DIANE Wynne, Levar Liu T, Pasquale, CH, Luis Eduardo, HC, & Enriqueta VICKY. (2005). Does widowhood affect memory performance of older persons? Psychological Medicine, 35(2), 217-226. Charlie Magaña, VICK Burns, & OFELIA Batres. (2005).   Health behaviors associated with better quality of life for older bereaved persons. Journal of Palliative Medicine, 8(1), . Jessica Torres.  (2004). Working with grieving adults. Advances in Psychiatric Treatment, 10, 164-170. John Reyes JT, Ross, [address] [facility], & Asael Polo. (2004). Life after death: Greif therapy after the suddent traumatic death of a family member. Perspectives in Psychiatric Care, 40(4), 633-869. Darryl, [facility], [facility] , & DIOR Harvey  (2000). Bereavement services in acute care settings. Death Studies, 24, 51-64. Neida Arellano, [address] [place], [place]  (2000). Psychotherapy of traumatic grief:  A review of evidence for psychotherapeutic treatment. Death Studies, 24, 479-495. DEIDRE Landaverde. (2004). Connections between counseling theories and current theories of grief and mourning. Journal of Mental Health Counseling, 26(2), 125-145.

## 2021-07-21 ENCOUNTER — OFFICE VISIT (OUTPATIENT)
Dept: PSYCHOLOGY | Age: 76
End: 2021-07-21
Payer: COMMERCIAL

## 2021-07-21 DIAGNOSIS — F43.21 GRIEF: Primary | ICD-10-CM

## 2021-07-21 PROCEDURE — 90834 PSYTX W PT 45 MINUTES: CPT | Performed by: SOCIAL WORKER

## 2021-07-21 ASSESSMENT — COLUMBIA-SUICIDE SEVERITY RATING SCALE - C-SSRS
6. HAVE YOU EVER DONE ANYTHING, STARTED TO DO ANYTHING, OR PREPARED TO DO ANYTHING TO END YOUR LIFE?: NO
2. HAVE YOU ACTUALLY HAD ANY THOUGHTS OF KILLING YOURSELF?: NO
1. WITHIN THE PAST MONTH, HAVE YOU WISHED YOU WERE DEAD OR WISHED YOU COULD GO TO SLEEP AND NOT WAKE UP?: YES

## 2021-07-21 ASSESSMENT — PATIENT HEALTH QUESTIONNAIRE - PHQ9
9. THOUGHTS THAT YOU WOULD BE BETTER OFF DEAD, OR OF HURTING YOURSELF: 2
7. TROUBLE CONCENTRATING ON THINGS, SUCH AS READING THE NEWSPAPER OR WATCHING TELEVISION: 1
2. FEELING DOWN, DEPRESSED OR HOPELESS: 1
3. TROUBLE FALLING OR STAYING ASLEEP: 1
5. POOR APPETITE OR OVEREATING: 0
8. MOVING OR SPEAKING SO SLOWLY THAT OTHER PEOPLE COULD HAVE NOTICED. OR THE OPPOSITE, BEING SO FIGETY OR RESTLESS THAT YOU HAVE BEEN MOVING AROUND A LOT MORE THAN USUAL: 0
SUM OF ALL RESPONSES TO PHQ QUESTIONS 1-9: 10
1. LITTLE INTEREST OR PLEASURE IN DOING THINGS: 1
SUM OF ALL RESPONSES TO PHQ QUESTIONS 1-9: 8
6. FEELING BAD ABOUT YOURSELF - OR THAT YOU ARE A FAILURE OR HAVE LET YOURSELF OR YOUR FAMILY DOWN: 1
4. FEELING TIRED OR HAVING LITTLE ENERGY: 3
SUM OF ALL RESPONSES TO PHQ QUESTIONS 1-9: 10
SUM OF ALL RESPONSES TO PHQ9 QUESTIONS 1 & 2: 2

## 2021-07-21 NOTE — PROGRESS NOTES
Behavioral Health Consultation- Follow UP  [address]  7/21/2021   9:30 AM      Time spent with Patient: 30 minutes  This is patient's second  [address]  appointment. Reason for Consult:    Chief Complaint   Patient presents with    Depression     Referring Provider: Alia Collazo MD  [address] [place] [address]    Patient provided informed consent for the behavioral health program. Discussed with patient model of service to include the limits of confidentiality (i.e. abuse reporting, suicide intervention, etc.) and short-term intervention focused approach. Pt indicated understanding. Feedback given to PCP. S:  Last appointment 7/9/21    Pt reports feeling \"better. \" Pt went to [place] with his daughter. Pt reports that he enjoyed spending time with her and being able to talk with her at the end of the day. Pt reports that he misses talking his wife at the end of the day. Pt reports nights at home since returning from trip are \"bearable. \" Pt reports reviewing handout on grief, and reports that he was able to identify with stages. Pt reports that he is reading a book about Catholism on \"how to get to heaven. \" Pt reports that he is interested in taking a course on Theology, and has asked his daughter to assist him with finding a class. Pt reports one of his biggest struggles is looking at death, and thinking about where his wife is now and what will happen to him after death. Pt reports looking forward to seeing God and his wife once he dies. Pt continues to endorse passive SI, with no plan/intent with protective factors of Restorationism and his daughter. Pt processed around thoughts and feelings related to his wife's passing. Pt is taking medications regularly. He reports that he has been having difficulty completing house tasks, and was open to developing a plan for task completion.       O:  MSE:    Appearance: good hygiene   Attitude: cooperative and friendly  Consciousness: alert  Orientation: oriented to person, place, time, general circumstance  Memory    recent and remote memory intact   Attention/Concentration    intact  Psychomotor Activity:normal  Eye Contact: normal  Speech: normal rate and volume, well-articulated  Mood    Depressed  Affect    depressed affect  Perception: within normal limits  Thought Content: within normal limits  Thought Process: logical, coherent and goal-directed  Associations    logical connections  Insight: good  Judgment    Intact  Appetite normal  Sleep disturbance Yes  Fatigue Yes  Loss of pleasure Yes  Impulsive behavior No  Morbid Ideation: passive thoughts of death  Suicide Assessment: suicidal ideation without plan or intent  Homicidal Ideation: no    History:    Medications:   Current Outpatient Medications   Medication Sig Dispense Refill    fenofibrate (TRICOR) 145 MG tablet TAKE ONE TABLET BY MOUTH DAILY 90 tablet 3    amLODIPine-benazepril (LOTREL) 10-20 MG per capsule TAKE ONE CAPSULE BY MOUTH DAILY 90 capsule 3    escitalopram (LEXAPRO) 10 MG tablet Take 1 tablet by mouth daily 30 tablet 3    metoprolol succinate (TOPROL XL) 200 MG extended release tablet TAKE ONE TABLET BY MOUTH DAILY 90 tablet 3    hydroCHLOROthiazide (HYDRODIURIL) 25 MG tablet TAKE ONE TABLET BY MOUTH DAILY 90 tablet 3     No current facility-administered medications for this visit.      Social History:   Social History     Socioeconomic History    Marital status:      Spouse name: Not on file    Number of children: Not on file    Years of education: Not on file    Highest education level: Not on file   Occupational History    Not on file   Tobacco Use    Smoking status: Former Smoker     Packs/day: 1.50     Years: 25.00     Pack years: 45.53     Quit date: 3/26/1988     Years since quittin.3    Smokeless tobacco: Never Used   Substance and Sexual Activity    Alcohol use: Yes     Comment: Rare    Drug use: Not on file    Sexual activity: Not on file   Other Topics Concern    Not on file   Social History Narrative    Not on file     Social Determinants of Health     Financial Resource Strain:     Difficulty of Paying Living Expenses:    Food Insecurity:     Worried About Running Out of Food in the Last Year:     [address] in the Last Year:    Transportation Needs:     Lack of Transportation (Medical):  Lack of Transportation (Non-Medical):    Physical Activity:     Days of Exercise per Week:     Minutes of Exercise per Session:    Stress:     Feeling of Stress :    Social Connections:     Frequency of Communication with Friends and Family:     Frequency of Social Gatherings with Friends and Family:     Attends [employer] Services:     Active Member of Clubs or Organizations:     Attends Club or Organization Meetings:     Marital Status:    Intimate Partner Violence:     Fear of Current or Ex-Partner:     Emotionally Abused:     Physically Abused:     Sexually Abused:      TOBACCO:   reports that he quit smoking about 33 years ago. He has a 37.50 pack-year smoking history. He has never used smokeless tobacco.  ETOH:   reports current alcohol use. Family History:   No family history on file. A:  Patient endorses stable mood. Denies SI/HI, with good insight and motivation. PHQ Scores 7/21/2021 7/9/2021 4/2/2021 3/5/2021 3/3/2021 11/3/2020 7/10/2020   PHQ2 Score 2 5 4 6 0 0 0   PHQ9 Score 10 21 14 19 0 0 0     Interpretation of Total Score Depression Severity: 1-4 = Minimal depression, 5-9 = Mild depression, 10-14 = Moderate depression, 15-19 = Moderately severe depression, 20-27 = Severe depression       Diagnosis:    1.  Grief          Past Diagnosis:        Diagnosis Date    Hyperlipidemia     Hypertension     Special screening for malignant neoplasm of prostate          Plan:  Patient interventions:  Discussed self-care (sleep, nutrition, rewarding activities, social support, exercise)  Discussed and set plan for behavioral activation  Trained in strategies for increasing balanced thinking  Supportive techniques    Patient Behavioral Change Plan:   See Patient Instructions

## 2021-07-21 NOTE — PATIENT INSTRUCTIONS
1. YouTube: Amelia Rosa we don't move on from grief, we move forward  2. Continue to take medications. 3. Vacuum home once/week.   4. Follow up with Jarrett Ramon

## 2021-08-03 ENCOUNTER — OFFICE VISIT (OUTPATIENT)
Dept: FAMILY MEDICINE CLINIC | Age: 76
End: 2021-08-03
Payer: COMMERCIAL

## 2021-08-03 VITALS
HEIGHT: 70 IN | WEIGHT: 223 LBS | DIASTOLIC BLOOD PRESSURE: 88 MMHG | OXYGEN SATURATION: 98 % | BODY MASS INDEX: 31.92 KG/M2 | HEART RATE: 68 BPM | RESPIRATION RATE: 16 BRPM | SYSTOLIC BLOOD PRESSURE: 162 MMHG

## 2021-08-03 DIAGNOSIS — F32.1 CURRENT MODERATE EPISODE OF MAJOR DEPRESSIVE DISORDER WITHOUT PRIOR EPISODE (HCC): Primary | ICD-10-CM

## 2021-08-03 DIAGNOSIS — E78.2 MIXED HYPERLIPIDEMIA: ICD-10-CM

## 2021-08-03 DIAGNOSIS — I10 ESSENTIAL HYPERTENSION: ICD-10-CM

## 2021-08-03 PROCEDURE — 99214 OFFICE O/P EST MOD 30 MIN: CPT | Performed by: FAMILY MEDICINE

## 2021-08-03 NOTE — PATIENT INSTRUCTIONS
Lokesh's Wort  [employer]'s Bounty brand    Take a 300 mg capsule twice daily (breakfast and dinner)  Real benefits show up 2-8 weeks in

## 2021-08-03 NOTE — PROGRESS NOTES
8/3/2021    Kev Lopez (:  1945) is a 76 y.o. male, here for evaluation of the following chief complaint(s):  Follow-up      ASSESSMENT/PLAN:     Diagnosis Orders   1. Current moderate episode of major depressive disorder without prior episode ([address]      denies SI; add SJW to Lexpro; see Rosario Sheridan 18 AUG; see me 1-2 weeks later; stressed self care take meds   2. Essential hypertension      he has been spotty on taking meds reviwed working on depression and self care   3. Mixed hyperlipidemia      reviewed fibrate use; decreasee alcohol use; carbs (more beer lately he acknowledges)       No follow-ups on file. An electronic signature was used to authenticate this note.     @SIG@    SUBJECTIVE/OBJECTIVE:  (NOTE : prior results listed below reviewed at this visit to assist in medical decision making.)    HPI / ROS    # continued grief reaction depresion we have a 30+ min discussion    # Hyper TG  - recent TGs 500+    # HTN - shane meds no CP/SOB  BP Readings from Last 3 Encounters:   21 (!) 162/88   21 (!) 140/82   21 134/70     Lab Results   Component Value Date     2021    K 4.4 2021     2021    CO2 22 2021    BUN 15 2021    CREATININE 1.0 2021    GLUCOSE 196 2021    CALCIUM 9.7 2021                 Wt Readings from Last 3 Encounters:   21 223 lb (101.2 kg)   21 220 lb (99.8 kg)   21 220 lb 6.4 oz (100 kg)       BP Readings from Last 3 Encounters:   21 (!) 162/88   21 (!) 140/82   21 134/70       PHYSICAL EXAM  Vitals:    21 0804 21 0853   BP: (!) 170/92 (!) 162/88   Pulse: 68    Resp: 16    SpO2: 98%    Weight: 223 lb (101.2 kg)    Height: 5' 10\" (1.778 m)      A&o

## 2021-08-17 NOTE — PROGRESS NOTES
Behavioral Health Consultation- Follow UP  MYRA Ortiz  8/18/2021   9:27 AM      Time spent with Patient: 37 minutes  This is patient's third  [address]  appointment. Reason for Consult:    Chief Complaint   Patient presents with    Depression     Referring Provider: Kyung Thomason MD  [address] [place] [address]    Patient provided informed consent for the behavioral health program. Discussed with patient model of service to include the limits of confidentiality (i.e. abuse reporting, suicide intervention, etc.) and short-term intervention focused approach. Pt indicated understanding. Feedback given to PCP. S:  Last appointment 7/21/21    Pt reports feeing \"pretty good\" over all, however reports have a \"bad spell\" that lasted about 3 days, which included significant depressive sxs. Pt reports it was triggered by writing down memories of his wife. Pt reports wanting to move out of the house that they shared together, due to the influx of memories. Discussed ways to modify his home. Pt reports having feelings of anger as well. Discussed healthy outlets and coping for anger. Pt continues to have low energy/motivation other than to play tennis or golf. Pt talked with a friend who lost his wife 20 years ago.        O:  MSE:    Appearance: good hygiene   Attitude: cooperative and friendly  Consciousness: alert  Orientation: oriented to person, place, time, general circumstance  Memory    recent and remote memory intact   Attention/Concentration    intact  Psychomotor Activity:normal  Eye Contact: normal  Speech: normal rate and volume, well-articulated  Mood    Depressed  Affect    depressed affect  Perception: within normal limits  Thought Content: within normal limits  Thought Process: logical, coherent and goal-directed  Associations    logical connections  Insight: good  Judgment    Intact  Appetite abnormal: decreased  Sleep disturbance Yes  Fatigue Yes  Loss of pleasure  Frequency of Communication with Friends and Family:     Frequency of Social Gatherings with Friends and Family:     Attends Hindu Services:     Active Member of Clubs or Organizations:     Attends Club or Organization Meetings:     Marital Status:    Intimate Partner Violence:     Fear of Current or Ex-Partner:     Emotionally Abused:     Physically Abused:     Sexually Abused:      TOBACCO:   reports that he quit smoking about 33 years ago. He has a 37.50 pack-year smoking history. He has never used smokeless tobacco.  ETOH:   reports current alcohol use. Family History:   No family history on file. A:  Patient endorses stable mood. Denies SI/HI, with good insight and motivation. Pt continues to move through depression and anger stages of grief. PHQ Scores 8/18/2021 7/21/2021 7/9/2021 4/2/2021 3/5/2021 3/3/2021 11/3/2020   PHQ2 Score 2 2 5 4 6 0 0   PHQ9 Score 12 10 21 14 19 0 0     Interpretation of Total Score Depression Severity: 1-4 = Minimal depression, 5-9 = Mild depression, 10-14 = Moderate depression, 15-19 = Moderately severe depression, 20-27 = Severe depression     MADISON 7 SCORE 8/18/2021 7/9/2021 4/2/2021 3/5/2021   MADISON-7 Total Score 5 14 - -   MADISON-7 Total Score - - 6 11     Interpretation of MADISON-7 score: 5-9 = mild anxiety, 10-14 = moderate anxiety, 15+ = severe anxiety. Recommend referral to behavioral health for scores 10 or greater. Diagnosis:    1.  Grief          Past Diagnosis:        Diagnosis Date    Hyperlipidemia     Hypertension     Special screening for malignant neoplasm of prostate          Plan:  Patient interventions:  Discussed self-care (sleep, nutrition, rewarding activities, social support, exercise)  Discussed and set plan for behavioral activation  Trained in strategies for increasing balanced thinking  Supportive techniques  Promoted hope using strengths-based approach   Discussed Pt's experience of stages of grief  Identified healthy processing and coping techniques to address anger.      Patient Behavioral Change Plan:   See Patient Instructions

## 2021-08-18 ENCOUNTER — OFFICE VISIT (OUTPATIENT)
Dept: PSYCHOLOGY | Age: 76
End: 2021-08-18
Payer: COMMERCIAL

## 2021-08-18 DIAGNOSIS — F43.21 GRIEF: Primary | ICD-10-CM

## 2021-08-18 PROCEDURE — 90832 PSYTX W PT 30 MINUTES: CPT | Performed by: SOCIAL WORKER

## 2021-08-18 ASSESSMENT — PATIENT HEALTH QUESTIONNAIRE - PHQ9
9. THOUGHTS THAT YOU WOULD BE BETTER OFF DEAD, OR OF HURTING YOURSELF: 1
5. POOR APPETITE OR OVEREATING: 1
4. FEELING TIRED OR HAVING LITTLE ENERGY: 1
7. TROUBLE CONCENTRATING ON THINGS, SUCH AS READING THE NEWSPAPER OR WATCHING TELEVISION: 2
1. LITTLE INTEREST OR PLEASURE IN DOING THINGS: 1
SUM OF ALL RESPONSES TO PHQ9 QUESTIONS 1 & 2: 2
3. TROUBLE FALLING OR STAYING ASLEEP: 2
6. FEELING BAD ABOUT YOURSELF - OR THAT YOU ARE A FAILURE OR HAVE LET YOURSELF OR YOUR FAMILY DOWN: 3
SUM OF ALL RESPONSES TO PHQ QUESTIONS 1-9: 12
SUM OF ALL RESPONSES TO PHQ QUESTIONS 1-9: 12
SUM OF ALL RESPONSES TO PHQ QUESTIONS 1-9: 11
2. FEELING DOWN, DEPRESSED OR HOPELESS: 1
8. MOVING OR SPEAKING SO SLOWLY THAT OTHER PEOPLE COULD HAVE NOTICED. OR THE OPPOSITE, BEING SO FIGETY OR RESTLESS THAT YOU HAVE BEEN MOVING AROUND A LOT MORE THAN USUAL: 0
10. IF YOU CHECKED OFF ANY PROBLEMS, HOW DIFFICULT HAVE THESE PROBLEMS MADE IT FOR YOU TO DO YOUR WORK, TAKE CARE OF THINGS AT HOME, OR GET ALONG WITH OTHER PEOPLE: 1

## 2021-08-18 ASSESSMENT — ANXIETY QUESTIONNAIRES
IF YOU CHECKED OFF ANY PROBLEMS ON THIS QUESTIONNAIRE, HOW DIFFICULT HAVE THESE PROBLEMS MADE IT FOR YOU TO DO YOUR WORK, TAKE CARE OF THINGS AT HOME, OR GET ALONG WITH OTHER PEOPLE: SOMEWHAT DIFFICULT
7. FEELING AFRAID AS IF SOMETHING AWFUL MIGHT HAPPEN: 0
3. WORRYING TOO MUCH ABOUT DIFFERENT THINGS: 0
5. BEING SO RESTLESS THAT IT IS HARD TO SIT STILL: 1
GAD7 TOTAL SCORE: 5
4. TROUBLE RELAXING: 1
6. BECOMING EASILY ANNOYED OR IRRITABLE: 1
2. NOT BEING ABLE TO STOP OR CONTROL WORRYING: 1
1. FEELING NERVOUS, ANXIOUS, OR ON EDGE: 1

## 2021-08-18 ASSESSMENT — COLUMBIA-SUICIDE SEVERITY RATING SCALE - C-SSRS
2. HAVE YOU ACTUALLY HAD ANY THOUGHTS OF KILLING YOURSELF?: NO
6. HAVE YOU EVER DONE ANYTHING, STARTED TO DO ANYTHING, OR PREPARED TO DO ANYTHING TO END YOUR LIFE?: NO
1. WITHIN THE PAST MONTH, HAVE YOU WISHED YOU WERE DEAD OR WISHED YOU COULD GO TO SLEEP AND NOT WAKE UP?: YES

## 2021-08-18 NOTE — PATIENT INSTRUCTIONS
1. Research wood sapphire for your home. 2. Continue to take medications. 3. Continue to play golf and tennis. 4. Consider utilizing a designated anger time, and identify an enjoyable activity to follow.

## 2021-08-25 ENCOUNTER — OFFICE VISIT (OUTPATIENT)
Dept: FAMILY MEDICINE CLINIC | Age: 76
End: 2021-08-25
Payer: COMMERCIAL

## 2021-08-25 VITALS
WEIGHT: 222.6 LBS | SYSTOLIC BLOOD PRESSURE: 161 MMHG | RESPIRATION RATE: 15 BRPM | HEART RATE: 60 BPM | DIASTOLIC BLOOD PRESSURE: 80 MMHG | HEIGHT: 70 IN | TEMPERATURE: 98.2 F | OXYGEN SATURATION: 97 % | BODY MASS INDEX: 31.87 KG/M2

## 2021-08-25 DIAGNOSIS — F32.4 MAJOR DEPRESSIVE DISORDER WITH SINGLE EPISODE, IN PARTIAL REMISSION (HCC): Primary | ICD-10-CM

## 2021-08-25 PROCEDURE — 99214 OFFICE O/P EST MOD 30 MIN: CPT | Performed by: FAMILY MEDICINE

## 2021-08-25 NOTE — PROGRESS NOTES
2021    Hermilo Nava (:  1945) is a 76 y.o. male, here for evaluation of the following chief complaint(s):  Follow-up      ASSESSMENT/PLAN:     Diagnosis Orders   1. Major depressive disorder with single episode, in partial remission [facility])      reviewed psychology notes and progress good on lexpro; cont med and talk therapy; 30 min F2F f/u 4 weeks       No follow-ups on file. An electronic signature was used to authenticate this note. @SIG@    SUBJECTIVE/OBJECTIVE:  (NOTE : prior results listed below reviewed at this visit to assist in medical decision making.)    HPI / ROS    # Depression - denies SI. Started Lexapro few weeks ago and saw Omkar Footman 3x already. WE discuss his progress so far. He is getting out for golf and tennis a little more now.         Wt Readings from Last 3 Encounters:   21 222 lb 9.6 oz (101 kg)   21 223 lb (101.2 kg)   21 220 lb (99.8 kg)       BP Readings from Last 3 Encounters:   21 (!) 161/80   21 (!) 162/88   21 (!) 140/82       PHYSICAL EXAM  Vitals:    21 1016   BP: (!) 161/80   Pulse: 60   Resp: 15   Temp: 98.2 °F (36.8 °C)   TempSrc: Infrared   SpO2: 97%   Weight: 222 lb 9.6 oz (101 kg)   Height: 5' 10\" (1.778 m)     A&o

## 2021-09-07 NOTE — PROGRESS NOTES
TAKE ONE TABLET BY MOUTH DAILY 90 tablet 3    amLODIPine-benazepril (LOTREL) 10-20 MG per capsule TAKE ONE CAPSULE BY MOUTH DAILY 90 capsule 3    escitalopram (LEXAPRO) 10 MG tablet Take 1 tablet by mouth daily 30 tablet 3    metoprolol succinate (TOPROL XL) 200 MG extended release tablet TAKE ONE TABLET BY MOUTH DAILY 90 tablet 3    hydroCHLOROthiazide (HYDRODIURIL) 25 MG tablet TAKE ONE TABLET BY MOUTH DAILY 90 tablet 3     No current facility-administered medications for this visit. Social History:   Social History     Socioeconomic History    Marital status:      Spouse name: Not on file    Number of children: Not on file    Years of education: Not on file    Highest education level: Not on file   Occupational History    Not on file   Tobacco Use    Smoking status: Former Smoker     Packs/day: 1.50     Years: 25.00     Pack years: 45.53     Quit date: 3/26/1988     Years since quittin.4    Smokeless tobacco: Never Used   Substance and Sexual Activity    Alcohol use: Yes     Comment: Rare    Drug use: Not on file    Sexual activity: Not on file   Other Topics Concern    Not on file   Social History Narrative    Not on file     Social Determinants of Health     Financial Resource Strain:     Difficulty of Paying Living Expenses:    Food Insecurity:     Worried About Running Out of Food in the Last Year:     [address] in the Last Year:    Transportation Needs:     Lack of Transportation (Medical):      Lack of Transportation (Non-Medical):    Physical Activity:     Days of Exercise per Week:     Minutes of Exercise per Session:    Stress:     Feeling of Stress :    Social Connections:     Frequency of Communication with Friends and Family:     Frequency of Social Gatherings with Friends and Family:     Attends [employer] Services:     Active Member of Clubs or Organizations:     Attends Club or Organization Meetings:     Marital Status:    Intimate Partner Violence:  Fear of Current or Ex-Partner:     Emotionally Abused:     Physically Abused:     Sexually Abused:      TOBACCO:   reports that he quit smoking about 33 years ago. He has a 37.50 pack-year smoking history. He has never used smokeless tobacco.  ETOH:   reports current alcohol use. Family History:   No family history on file. A:  Patient endorses stable mood. Denies SI/HI, with good insight and motivation. PHQ Scores 9/8/2021 8/18/2021 7/21/2021 7/9/2021 4/2/2021 3/5/2021 3/3/2021   PHQ2 Score 3 2 2 5 4 6 0   PHQ9 Score 10 12 10 21 14 19 0     Interpretation of Total Score Depression Severity: 1-4 = Minimal depression, 5-9 = Mild depression, 10-14 = Moderate depression, 15-19 = Moderately severe depression, 20-27 = Severe depression     MADISON 7 SCORE 9/8/2021 8/18/2021 7/9/2021 4/2/2021 3/5/2021   MADISON-7 Total Score 9 5 14 - -   MADISON-7 Total Score - - - 6 11     Interpretation of MADISON-7 score: 5-9 = mild anxiety, 10-14 = moderate anxiety, 15+ = severe anxiety. Recommend referral to behavioral health for scores 10 or greater. Diagnosis:    1.  Grief          Past Diagnosis:        Diagnosis Date    Hyperlipidemia     Hypertension     Special screening for malignant neoplasm of prostate          Plan:  Patient interventions:  Discussed self-care (sleep, nutrition, rewarding activities, social support, exercise)  Supportive techniques  Promoted hope using strengths-based approach   Reviewed stages of grief, and how they relate to Pt's current emotional experiences  Discussed ways to process grief      Patient Behavioral Change Plan:   See Patient Instructions

## 2021-09-08 ENCOUNTER — OFFICE VISIT (OUTPATIENT)
Dept: PSYCHOLOGY | Age: 76
End: 2021-09-08
Payer: COMMERCIAL

## 2021-09-08 DIAGNOSIS — F43.21 GRIEF: Primary | ICD-10-CM

## 2021-09-08 PROCEDURE — 90834 PSYTX W PT 45 MINUTES: CPT | Performed by: SOCIAL WORKER

## 2021-09-08 ASSESSMENT — PATIENT HEALTH QUESTIONNAIRE - PHQ9
SUM OF ALL RESPONSES TO PHQ9 QUESTIONS 1 & 2: 3
4. FEELING TIRED OR HAVING LITTLE ENERGY: 1
SUM OF ALL RESPONSES TO PHQ QUESTIONS 1-9: 9
6. FEELING BAD ABOUT YOURSELF - OR THAT YOU ARE A FAILURE OR HAVE LET YOURSELF OR YOUR FAMILY DOWN: 2
5. POOR APPETITE OR OVEREATING: 0
3. TROUBLE FALLING OR STAYING ASLEEP: 2
9. THOUGHTS THAT YOU WOULD BE BETTER OFF DEAD, OR OF HURTING YOURSELF: 1
2. FEELING DOWN, DEPRESSED OR HOPELESS: 2
SUM OF ALL RESPONSES TO PHQ QUESTIONS 1-9: 10
8. MOVING OR SPEAKING SO SLOWLY THAT OTHER PEOPLE COULD HAVE NOTICED. OR THE OPPOSITE, BEING SO FIGETY OR RESTLESS THAT YOU HAVE BEEN MOVING AROUND A LOT MORE THAN USUAL: 0
10. IF YOU CHECKED OFF ANY PROBLEMS, HOW DIFFICULT HAVE THESE PROBLEMS MADE IT FOR YOU TO DO YOUR WORK, TAKE CARE OF THINGS AT HOME, OR GET ALONG WITH OTHER PEOPLE: 1
1. LITTLE INTEREST OR PLEASURE IN DOING THINGS: 1
SUM OF ALL RESPONSES TO PHQ QUESTIONS 1-9: 10
7. TROUBLE CONCENTRATING ON THINGS, SUCH AS READING THE NEWSPAPER OR WATCHING TELEVISION: 1

## 2021-09-08 ASSESSMENT — ANXIETY QUESTIONNAIRES
GAD7 TOTAL SCORE: 9
1. FEELING NERVOUS, ANXIOUS, OR ON EDGE: 1
7. FEELING AFRAID AS IF SOMETHING AWFUL MIGHT HAPPEN: 1
2. NOT BEING ABLE TO STOP OR CONTROL WORRYING: 2
IF YOU CHECKED OFF ANY PROBLEMS ON THIS QUESTIONNAIRE, HOW DIFFICULT HAVE THESE PROBLEMS MADE IT FOR YOU TO DO YOUR WORK, TAKE CARE OF THINGS AT HOME, OR GET ALONG WITH OTHER PEOPLE: SOMEWHAT DIFFICULT
3. WORRYING TOO MUCH ABOUT DIFFERENT THINGS: 2
5. BEING SO RESTLESS THAT IT IS HARD TO SIT STILL: 0
6. BECOMING EASILY ANNOYED OR IRRITABLE: 2
4. TROUBLE RELAXING: 1

## 2021-09-08 NOTE — PATIENT INSTRUCTIONS
1. Research wood sapphire for your home. 2. Continue to take medications. 3. Continue to play golf and tennis. 4. Consider utilizing a designated anger time, and identify an enjoyable activity to follow.    5. Consider writing a letter to [facility] and identify a cathartic method of disposal.

## 2021-09-21 NOTE — PROGRESS NOTES
2021    Carlos Jernigan (:  1945) is a 76 y.o. male, here for evaluation of the following chief complaint(s):  Hypertension, Depression, and Anxiety      ASSESSMENT/PLAN:     Diagnosis Orders   1. Major depressive disorder with single episode, in partial remission ([facility] [address]      OK current meds and cont talk therapy   2. Needs flu shot  INFLUENZA, QUADV, ADJUVANTED, 65 YRS =, IM, PF, PREFILL SYR, 0.5ML (FLUAD)   3. Obstructive sleep apnea syndrome      new RX mask cpap. com   4. Essential hypertension      not at goal oon 4 meds; we disscuss fix JANICE and recheck       Return in about 6 weeks (around 11/3/2021). An electronic signature was used to authenticate this note. @SIG@    SUBJECTIVE/OBJECTIVE:  (NOTE : prior results listed below reviewed at this visit to assist in medical decision making.)    HPI / ROS    # Depression - denies SI. OK on current med(s) per patient. Improvement with Lexpro and talk therapy. # HTN - shane meds no CP/SOB he quit using cpap machine.   BP Readings from Last 3 Encounters:   21 (!) 162/86   21 (!) 161/80   21 (!) 162     Lab Results   Component Value Date     2021    K 4.4 2021     2021    CO2 22 2021    BUN 15 2021    CREATININE 1.0 2021    GLUCOSE 196 2021    CALCIUM 9.7 2021           # JANICE not using cpap he has no mask wants help      Wt Readings from Last 3 Encounters:   21 226 lb (102.5 kg)   21 222 lb 9.6 oz (101 kg)   21 223 lb (101.2 kg)       BP Readings from Last 3 Encounters:   21 (!) 162/86   21 (!) 161/80   21 (!) 162/88       PHYSICAL EXAM  Vitals:    21 0923   BP: (!) 162/86   Pulse: 63   SpO2: 99%   Weight: 226 lb (102.5 kg)     A&o

## 2021-09-22 ENCOUNTER — OFFICE VISIT (OUTPATIENT)
Dept: FAMILY MEDICINE CLINIC | Age: 76
End: 2021-09-22
Payer: COMMERCIAL

## 2021-09-22 VITALS
BODY MASS INDEX: 32.43 KG/M2 | HEART RATE: 63 BPM | OXYGEN SATURATION: 99 % | DIASTOLIC BLOOD PRESSURE: 86 MMHG | SYSTOLIC BLOOD PRESSURE: 162 MMHG | WEIGHT: 226 LBS

## 2021-09-22 DIAGNOSIS — Z23 NEEDS FLU SHOT: ICD-10-CM

## 2021-09-22 DIAGNOSIS — G47.33 OBSTRUCTIVE SLEEP APNEA SYNDROME: ICD-10-CM

## 2021-09-22 DIAGNOSIS — I10 ESSENTIAL HYPERTENSION: ICD-10-CM

## 2021-09-22 DIAGNOSIS — F32.4 MAJOR DEPRESSIVE DISORDER WITH SINGLE EPISODE, IN PARTIAL REMISSION (HCC): Primary | ICD-10-CM

## 2021-09-22 PROCEDURE — G0008 ADMIN INFLUENZA VIRUS VAC: HCPCS | Performed by: FAMILY MEDICINE

## 2021-09-22 PROCEDURE — 90694 VACC AIIV4 NO PRSRV 0.5ML IM: CPT | Performed by: FAMILY MEDICINE

## 2021-09-22 PROCEDURE — 99214 OFFICE O/P EST MOD 30 MIN: CPT | Performed by: FAMILY MEDICINE

## 2021-09-22 SDOH — ECONOMIC STABILITY: FOOD INSECURITY: WITHIN THE PAST 12 MONTHS, YOU WORRIED THAT YOUR FOOD WOULD RUN OUT BEFORE YOU GOT MONEY TO BUY MORE.: NEVER TRUE

## 2021-09-22 SDOH — ECONOMIC STABILITY: FOOD INSECURITY: WITHIN THE PAST 12 MONTHS, THE FOOD YOU BOUGHT JUST DIDN'T LAST AND YOU DIDN'T HAVE MONEY TO GET MORE.: NEVER TRUE

## 2021-09-22 ASSESSMENT — SOCIAL DETERMINANTS OF HEALTH (SDOH): HOW HARD IS IT FOR YOU TO PAY FOR THE VERY BASICS LIKE FOOD, HOUSING, MEDICAL CARE, AND HEATING?: NOT HARD AT ALL

## 2021-10-06 ENCOUNTER — TELEPHONE (OUTPATIENT)
Dept: PSYCHOLOGY | Age: 76
End: 2021-10-06

## 2021-10-06 DIAGNOSIS — F43.21 GRIEF: Primary | ICD-10-CM

## 2021-10-20 ENCOUNTER — OFFICE VISIT (OUTPATIENT)
Dept: PSYCHOLOGY | Age: 76
End: 2021-10-20
Payer: COMMERCIAL

## 2021-10-20 DIAGNOSIS — F43.21 GRIEF: Primary | ICD-10-CM

## 2021-10-20 PROCEDURE — 90832 PSYTX W PT 30 MINUTES: CPT | Performed by: SOCIAL WORKER

## 2021-10-20 RX ORDER — HYDROCHLOROTHIAZIDE 25 MG/1
TABLET ORAL
Qty: 90 TABLET | Refills: 3 | Status: SHIPPED | OUTPATIENT
Start: 2021-10-20 | End: 2022-08-19 | Stop reason: SDUPTHER

## 2021-10-20 ASSESSMENT — ANXIETY QUESTIONNAIRES
GAD7 TOTAL SCORE: 3
1. FEELING NERVOUS, ANXIOUS, OR ON EDGE: 0
2. NOT BEING ABLE TO STOP OR CONTROL WORRYING: 0
3. WORRYING TOO MUCH ABOUT DIFFERENT THINGS: 1
7. FEELING AFRAID AS IF SOMETHING AWFUL MIGHT HAPPEN: 0
6. BECOMING EASILY ANNOYED OR IRRITABLE: 1
4. TROUBLE RELAXING: 1
5. BEING SO RESTLESS THAT IT IS HARD TO SIT STILL: 0

## 2021-10-20 ASSESSMENT — PATIENT HEALTH QUESTIONNAIRE - PHQ9
SUM OF ALL RESPONSES TO PHQ QUESTIONS 1-9: 4
7. TROUBLE CONCENTRATING ON THINGS, SUCH AS READING THE NEWSPAPER OR WATCHING TELEVISION: 0
5. POOR APPETITE OR OVEREATING: 0
1. LITTLE INTEREST OR PLEASURE IN DOING THINGS: 1
SUM OF ALL RESPONSES TO PHQ QUESTIONS 1-9: 4
2. FEELING DOWN, DEPRESSED OR HOPELESS: 1
SUM OF ALL RESPONSES TO PHQ9 QUESTIONS 1 & 2: 2
9. THOUGHTS THAT YOU WOULD BE BETTER OFF DEAD, OR OF HURTING YOURSELF: 0
4. FEELING TIRED OR HAVING LITTLE ENERGY: 1
6. FEELING BAD ABOUT YOURSELF - OR THAT YOU ARE A FAILURE OR HAVE LET YOURSELF OR YOUR FAMILY DOWN: 1
3. TROUBLE FALLING OR STAYING ASLEEP: 0
8. MOVING OR SPEAKING SO SLOWLY THAT OTHER PEOPLE COULD HAVE NOTICED. OR THE OPPOSITE, BEING SO FIGETY OR RESTLESS THAT YOU HAVE BEEN MOVING AROUND A LOT MORE THAN USUAL: 0
SUM OF ALL RESPONSES TO PHQ QUESTIONS 1-9: 4

## 2021-10-20 NOTE — TELEPHONE ENCOUNTER
Medication:   Requested Prescriptions     Pending Prescriptions Disp Refills    hydroCHLOROthiazide (HYDRODIURIL) 25 MG tablet [Pharmacy Med Name: hydroCHLOROthiazide 25 MG TABLET] 90 tablet 3     Sig: TAKE ONE TABLET BY MOUTH DAILY       Last Filled:      Patient Phone Number: [phone] (home)     Last appt: 9/22/2021   Next appt: 11/3/2021    Last Labs DM:   Lab Results   Component Value Date    LABA1C 6.5 07/02/2021     Last Lipid:   Lab Results   Component Value Date    CHOL 211 07/02/2021    TRIG 531 07/02/2021    HDL 31 07/02/2021    LDLCALC see below 07/02/2021     Last PSA:   Lab Results   Component Value Date    PSA 2.82 07/02/2021     Last Thyroid:   Lab Results   Component Value Date    TSH 2.44 12/19/2014    T4FREE 1.1 12/19/2014

## 2021-10-20 NOTE — PATIENT INSTRUCTIONS
1. Listen to music at home when feeling lonely. 2. Create a relaxing bedtime routine: listening to music, reading, deep breathing  3. Continue to take medications. 4. Continue to play golf and tennis. Relaxation:  Diaphragmatic Breathing             ______________________________________________________________________________    1. Sit in a comfortable position    2. Place one hand on your stomach and the other on your chest    3. Try to breathe so that only your stomach rises and falls    As you inhale, concentrate on your chest remaining relatively still while your stomach rises. It may be helpful for you to imagine that your pants are too big and you need to push your stomach out to hold them up. When exhaling, allow your stomach to fall in and the air to fully escape. Inhale slowly. You may choose to hold the air in for about a second. Exhale slowly. Dont push the air out, but just let the natural pressure of your body slowly move it out. It is normal for this healthy method of breathing to feel a little awkward at first.  With practice, it will feel more natural.    4. Get your mind on your side      One other important factor in getting relaxed is your mind. Your mind and body are connected. The mind influences the body and the body influences the mind. What you do with your mind when you are trying to relax is very important. The key is to avoid thinking about stressful things. You can think about       Neutral things (e.g., counting, saying a word like calm or relax)    Pleasant things (e.g., imagining a pleasant place)     5. It is recommended that you practice 2 times per day, 10 minutes each time. 4 -7- 8 Breath Relaxation Exercise To manage Stress/Anxiety    Anyone can do it. ..    Simple    Quick    No equipment needed    Do it anywhere    Are the numbers important?   The absolute time you spend on each phase is not important; the ratio of 4:7:8 is important. If you have trouble  holding your breath, speed the exercise up but keep to  the ratio of 4:7:8 for the three phases. With practice you  can slow it all down and get used to inhaling and exhaling more and more deeply. Why should I do it? This exercise is a natural tranquilizer for the nervous system. Unlike tranquilizing drugs, which are often effective  when you first take them but then lose their power over  time, this exercise is subtle when you first try it but gains  in power with repetition and practice. Use this new skill  whenever anything upsetting happens - before you react. Use it whenever you are aware of internal tension. Use it  to help you fall asleep. How often? Do it at least twice a day. You cannot do it too frequently. Do not do more than four breaths at one time for the first  month of practice. Later, if you wish, you can extend it to  eight breaths. If you feel a little lightheaded when you  first breathe this way, do not be concerned - it will pass. STEPS  Exhale completely through your mouth, making a whoosh sound. Close your mouth and inhale quietly through your nose to a mental count of 4. Hold your breath for a count of 7. Exhale completely through your mouth, making a whoosh sound to a count of 8. This is one breath. Now inhale again and repeat the cycle three more times for a total of four breaths. Beginner Tips:  Ideally, sit with your back straight. Place the tip of your tongue against the ridge of tissue just  behind your upper front teeth, and keep it there through the  entire exercise.   Exhale through your mouth around your tongue; try pursing  your lips slightly if this seems awkward

## 2021-10-20 NOTE — PROGRESS NOTES
Behavioral Health Consultation- Follow UP  MYRA Dacosta  10/20/2021   10:59 AM      Time spent with Patient: 35 minutes  This is patient's fifth  [facility] appointment. Reason for Consult:    Chief Complaint   Patient presents with    Other     grief     Referring Provider: Lorenza Sepulveda MD  [address] [place] [address]    Patient provided informed consent for the behavioral health program. Discussed with patient model of service to include the limits of confidentiality (i.e. abuse reporting, suicide intervention, etc.) and short-term intervention focused approach. Pt indicated understanding. Feedback given to PCP. S:  Last appointment 9/8/21    Pt reports improvement in mood. Pt is considering spending the [place] in [place]. Pt states that he continues to have difficult days related to grief, however is accepting this is the new normal. Pt has difficulty sleeping/going to bed, due to racing thoughts. Pt continues to engage with friends, and in activities that he enjoys. Pt limits listening to news and negative information.      O:  MSE:    Appearance: good hygiene   Attitude: cooperative and friendly  Consciousness: alert  Orientation: oriented to person, place, time, general circumstance  Memory    recent and remote memory intact   Attention/Concentration    intact  Psychomotor Activity:normal  Eye Contact: normal  Speech: normal rate and volume, well-articulated  Mood    Depressed  Affect    depressed affect  Perception: within normal limits  Thought Content: within normal limits  Thought Process: logical, coherent and goal-directed  Associations    logical connections  Insight: good  Judgment    Intact  Appetite normal  Sleep disturbance Yes  Fatigue No  Loss of pleasure Yes  Impulsive behavior No  Morbid Ideation: no   Suicide Assessment: no suicidal ideation, plan, or intent  Homicidal Ideation: no    History:    Medications:   Current Outpatient Medications   Medication Sig Dispense Refill    fenofibrate (TRICOR) 145 MG tablet TAKE ONE TABLET BY MOUTH DAILY 90 tablet 3    amLODIPine-benazepril (LOTREL) 10-20 MG per capsule TAKE ONE CAPSULE BY MOUTH DAILY 90 capsule 3    escitalopram (LEXAPRO) 10 MG tablet Take 1 tablet by mouth daily 30 tablet 3    metoprolol succinate (TOPROL XL) 200 MG extended release tablet TAKE ONE TABLET BY MOUTH DAILY 90 tablet 3    hydroCHLOROthiazide (HYDRODIURIL) 25 MG tablet TAKE ONE TABLET BY MOUTH DAILY 90 tablet 3     No current facility-administered medications for this visit. Social History:   Social History     Socioeconomic History    Marital status:      Spouse name: Not on file    Number of children: Not on file    Years of education: Not on file    Highest education level: Not on file   Occupational History    Not on file   Tobacco Use    Smoking status: Former Smoker     Packs/day: 1.50     Years: 25.00     Pack years: 45.53     Quit date: 3/26/1988     Years since quittin.5    Smokeless tobacco: Never Used   Substance and Sexual Activity    Alcohol use: Yes     Comment: Rare    Drug use: Not on file    Sexual activity: Not on file   Other Topics Concern    Not on file   Social History Narrative    Not on file     Social Determinants of Health     Financial Resource Strain: Low Risk     Difficulty of Paying Living Expenses: Not hard at all   Food Insecurity: No Food Insecurity    Worried About [facility] in the Last Year: Never true    [facility] in the Last Year: Never true   Transportation Needs:     Lack of Transportation (Medical):      Lack of Transportation (Non-Medical):    Physical Activity:     Days of Exercise per Week:     Minutes of Exercise per Session:    Stress:     Feeling of Stress :    Social Connections:     Frequency of Communication with Friends and Family:     Frequency of Social Gatherings with Friends and Family:     Attends Orthodoxy Services:     Active Member of Clubs or Organizations:     Attends Club or Organization Meetings:     Marital Status:    Intimate Partner Violence:     Fear of Current or Ex-Partner:     Emotionally Abused:     Physically Abused:     Sexually Abused:      TOBACCO:   reports that he quit smoking about 33 years ago. He has a 37.50 pack-year smoking history. He has never used smokeless tobacco.  ETOH:   reports current alcohol use. Family History:   No family history on file. A:  Patient endorses stable mood. Denies SI/HI, with good insight and motivation. PHQ Scores 10/20/2021 9/8/2021 8/18/2021 7/21/2021 7/9/2021 4/2/2021 3/5/2021   PHQ2 Score 2 3 2 2 5 4 6   PHQ9 Score 4 10 12 10 21 14 19     Interpretation of Total Score Depression Severity: 1-4 = Minimal depression, 5-9 = Mild depression, 10-14 = Moderate depression, 15-19 = Moderately severe depression, 20-27 = Severe depression     MADISON 7 SCORE 10/20/2021 9/8/2021 8/18/2021 7/9/2021 4/2/2021 3/5/2021   MADISON-7 Total Score 3 9 5 14 - -   MADISON-7 Total Score - - - - 6 11     Interpretation of MADISON-7 score: 5-9 = mild anxiety, 10-14 = moderate anxiety, 15+ = severe anxiety. Recommend referral to behavioral health for scores 10 or greater. Diagnosis:    1.  Grief          Past Diagnosis:        Diagnosis Date    Hyperlipidemia     Hypertension     Special screening for malignant neoplasm of prostate          Plan:  Patient interventions:  Emphasized self-care as important for managing overall health  Explained relaxed breathing technique in detail and practiced this with pt in visit  Supportive techniques  Promoted hope using strengths-based approach   Discussed sleep hygiene tips    Patient Behavioral Change Plan:   See Patient Instructions

## 2021-11-03 ENCOUNTER — TELEPHONE (OUTPATIENT)
Dept: FAMILY MEDICINE CLINIC | Age: 76
End: 2021-11-03

## 2021-11-03 NOTE — TELEPHONE ENCOUNTER
----- Message from Rufino Campbellsbury sent at 11/3/2021 12:50 PM EDT -----  Subject: Message to Provider    QUESTIONS  Information for Provider? PT calling to let PCP know that script for CPAP   hasn't been received by pharmacy. PT wants order for machine faxed over to   CPAP @ [phone]  ---------------------------------------------------------------------------  --------------  CALL BACK INFO  What is the best way for the office to contact you? OK to leave message on   voicemail  Preferred Call Back Phone Number? [phone]  ---------------------------------------------------------------------------  --------------  SCRIPT ANSWERS  Relationship to Patient?  Self

## 2022-02-10 ENCOUNTER — APPOINTMENT (OUTPATIENT)
Dept: GENERAL RADIOLOGY | Age: 77
DRG: 312 | End: 2022-02-10
Payer: COMMERCIAL

## 2022-02-10 ENCOUNTER — APPOINTMENT (OUTPATIENT)
Dept: CT IMAGING | Age: 77
DRG: 312 | End: 2022-02-10
Payer: COMMERCIAL

## 2022-02-10 ENCOUNTER — HOSPITAL ENCOUNTER (INPATIENT)
Age: 77
LOS: 1 days | Discharge: HOME | DRG: 312 | End: 2022-02-11
Attending: EMERGENCY MEDICINE | Admitting: STUDENT IN AN ORGANIZED HEALTH CARE EDUCATION/TRAINING PROGRAM
Payer: COMMERCIAL

## 2022-02-10 DIAGNOSIS — R55 SYNCOPE AND COLLAPSE: Primary | ICD-10-CM

## 2022-02-10 DIAGNOSIS — S09.90XA CLOSED HEAD INJURY, INITIAL ENCOUNTER: ICD-10-CM

## 2022-02-10 LAB
A/G RATIO: 1 (ref 1.1–2.2)
A/G RATIO: 1.2 (ref 1.1–2.2)
ALBUMIN SERPL-MCNC: 3.7 G/DL (ref 3.4–5)
ALBUMIN SERPL-MCNC: 3.9 G/DL (ref 3.4–5)
ALP BLD-CCNC: 68 U/L (ref 40–129)
ALP BLD-CCNC: 71 U/L (ref 40–129)
ALT SERPL-CCNC: 18 U/L (ref 10–40)
ALT SERPL-CCNC: 21 U/L (ref 10–40)
ANION GAP SERPL CALCULATED.3IONS-SCNC: 11 MMOL/L (ref 3–16)
ANION GAP SERPL CALCULATED.3IONS-SCNC: 14 MMOL/L (ref 3–16)
APTT: 32.5 SEC (ref 26.2–38.6)
AST SERPL-CCNC: 39 U/L (ref 15–37)
AST SERPL-CCNC: 43 U/L (ref 15–37)
BASOPHILS ABSOLUTE: 0.1 K/UL (ref 0–0.2)
BASOPHILS ABSOLUTE: 0.1 K/UL (ref 0–0.2)
BASOPHILS RELATIVE PERCENT: 0.7 %
BASOPHILS RELATIVE PERCENT: 1 %
BILIRUB SERPL-MCNC: 0.6 MG/DL (ref 0–1)
BILIRUB SERPL-MCNC: 0.8 MG/DL (ref 0–1)
BILIRUBIN URINE: NEGATIVE
BLOOD, URINE: NEGATIVE
BUN BLDV-MCNC: 13 MG/DL (ref 7–20)
BUN BLDV-MCNC: 14 MG/DL (ref 7–20)
CALCIUM SERPL-MCNC: 8.9 MG/DL (ref 8.3–10.6)
CALCIUM SERPL-MCNC: 8.9 MG/DL (ref 8.3–10.6)
CHLORIDE BLD-SCNC: 100 MMOL/L (ref 99–110)
CHLORIDE BLD-SCNC: 98 MMOL/L (ref 99–110)
CLARITY: CLEAR
CO2: 20 MMOL/L (ref 21–32)
CO2: 22 MMOL/L (ref 21–32)
COLOR: YELLOW
COMMENT UA: NORMAL
CREAT SERPL-MCNC: 0.8 MG/DL (ref 0.8–1.3)
CREAT SERPL-MCNC: 0.8 MG/DL (ref 0.8–1.3)
EKG ATRIAL RATE: 75 BPM
EKG DIAGNOSIS: NORMAL
EKG P AXIS: 26 DEGREES
EKG P-R INTERVAL: 150 MS
EKG Q-T INTERVAL: 434 MS
EKG QRS DURATION: 88 MS
EKG QTC CALCULATION (BAZETT): 484 MS
EKG R AXIS: -3 DEGREES
EKG T AXIS: 19 DEGREES
EKG VENTRICULAR RATE: 75 BPM
EOSINOPHILS ABSOLUTE: 0.1 K/UL (ref 0–0.6)
EOSINOPHILS ABSOLUTE: 0.2 K/UL (ref 0–0.6)
EOSINOPHILS RELATIVE PERCENT: 1.3 %
EOSINOPHILS RELATIVE PERCENT: 1.7 %
EPITHELIAL CELLS, UA: 4 /HPF (ref 0–5)
GFR AFRICAN AMERICAN: >60
GFR AFRICAN AMERICAN: >60
GFR NON-AFRICAN AMERICAN: >60
GFR NON-AFRICAN AMERICAN: >60
GLUCOSE BLD-MCNC: 129 MG/DL (ref 70–99)
GLUCOSE BLD-MCNC: 158 MG/DL (ref 70–99)
GLUCOSE URINE: NEGATIVE MG/DL
HCT VFR BLD CALC: 46.7 % (ref 40.5–52.5)
HCT VFR BLD CALC: 52.6 % (ref 40.5–52.5)
HEMOGLOBIN: 16.2 G/DL (ref 13.5–17.5)
HEMOGLOBIN: 18.2 G/DL (ref 13.5–17.5)
HYALINE CASTS: 2 /LPF (ref 0–8)
INR BLD: 0.93 (ref 0.88–1.12)
KETONES, URINE: NEGATIVE MG/DL
LEUKOCYTE ESTERASE, URINE: NEGATIVE
LYMPHOCYTES ABSOLUTE: 1.4 K/UL (ref 1–5.1)
LYMPHOCYTES ABSOLUTE: 1.8 K/UL (ref 1–5.1)
LYMPHOCYTES RELATIVE PERCENT: 11.9 %
LYMPHOCYTES RELATIVE PERCENT: 18.6 %
MAGNESIUM: 1.8 MG/DL (ref 1.8–2.4)
MCH RBC QN AUTO: 30.5 PG (ref 26–34)
MCH RBC QN AUTO: 30.9 PG (ref 26–34)
MCHC RBC AUTO-ENTMCNC: 34.6 G/DL (ref 31–36)
MCHC RBC AUTO-ENTMCNC: 34.7 G/DL (ref 31–36)
MCV RBC AUTO: 88.1 FL (ref 80–100)
MCV RBC AUTO: 89.3 FL (ref 80–100)
MICROSCOPIC EXAMINATION: YES
MONOCYTES ABSOLUTE: 1 K/UL (ref 0–1.3)
MONOCYTES ABSOLUTE: 1.2 K/UL (ref 0–1.3)
MONOCYTES RELATIVE PERCENT: 10.6 %
MONOCYTES RELATIVE PERCENT: 9.9 %
NEUTROPHILS ABSOLUTE: 6.6 K/UL (ref 1.7–7.7)
NEUTROPHILS ABSOLUTE: 8.7 K/UL (ref 1.7–7.7)
NEUTROPHILS RELATIVE PERCENT: 69.1 %
NEUTROPHILS RELATIVE PERCENT: 75.2 %
NITRITE, URINE: NEGATIVE
PDW BLD-RTO: 13.3 % (ref 12.4–15.4)
PDW BLD-RTO: 13.7 % (ref 12.4–15.4)
PH UA: 7 (ref 5–8)
PLATELET # BLD: 208 K/UL (ref 135–450)
PLATELET # BLD: 236 K/UL (ref 135–450)
PLATELET SLIDE REVIEW: ADEQUATE
PMV BLD AUTO: 7.4 FL (ref 5–10.5)
PMV BLD AUTO: 8.3 FL (ref 5–10.5)
POTASSIUM SERPL-SCNC: 4.1 MMOL/L (ref 3.5–5.1)
POTASSIUM SERPL-SCNC: 4.6 MMOL/L (ref 3.5–5.1)
PROTEIN UA: 30 MG/DL
PROTHROMBIN TIME: 10.5 SEC (ref 9.9–12.7)
RBC # BLD: 5.3 M/UL (ref 4.2–5.9)
RBC # BLD: 5.88 M/UL (ref 4.2–5.9)
RBC UA: 3 /HPF (ref 0–4)
RENAL EPITHELIAL, UA: NORMAL /HPF (ref 0–1)
SLIDE REVIEW: ABNORMAL
SODIUM BLD-SCNC: 131 MMOL/L (ref 136–145)
SODIUM BLD-SCNC: 134 MMOL/L (ref 136–145)
SPECIFIC GRAVITY UA: 1.02 (ref 1–1.03)
TOTAL PROTEIN: 6.9 G/DL (ref 6.4–8.2)
TOTAL PROTEIN: 7.7 G/DL (ref 6.4–8.2)
TROPONIN: <0.01 NG/ML
TROPONIN: <0.01 NG/ML
URINE REFLEX TO CULTURE: ABNORMAL
URINE TYPE: ABNORMAL
UROBILINOGEN, URINE: 1 E.U./DL
WBC # BLD: 11.5 K/UL (ref 4–11)
WBC # BLD: 9.6 K/UL (ref 4–11)
WBC UA: 2 /HPF (ref 0–5)

## 2022-02-10 PROCEDURE — 71045 X-RAY EXAM CHEST 1 VIEW: CPT

## 2022-02-10 PROCEDURE — 85730 THROMBOPLASTIN TIME PARTIAL: CPT

## 2022-02-10 PROCEDURE — 84484 ASSAY OF TROPONIN QUANT: CPT

## 2022-02-10 PROCEDURE — 70450 CT HEAD/BRAIN W/O DYE: CPT

## 2022-02-10 PROCEDURE — 2580000003 HC RX 258: Performed by: STUDENT IN AN ORGANIZED HEALTH CARE EDUCATION/TRAINING PROGRAM

## 2022-02-10 PROCEDURE — 85610 PROTHROMBIN TIME: CPT

## 2022-02-10 PROCEDURE — 72125 CT NECK SPINE W/O DYE: CPT

## 2022-02-10 PROCEDURE — 2060000000 HC ICU INTERMEDIATE R&B

## 2022-02-10 PROCEDURE — 81001 URINALYSIS AUTO W/SCOPE: CPT

## 2022-02-10 PROCEDURE — G0378 HOSPITAL OBSERVATION PER HR: HCPCS

## 2022-02-10 PROCEDURE — 36415 COLL VENOUS BLD VENIPUNCTURE: CPT

## 2022-02-10 PROCEDURE — 99285 EMERGENCY DEPT VISIT HI MDM: CPT

## 2022-02-10 PROCEDURE — 93005 ELECTROCARDIOGRAM TRACING: CPT | Performed by: EMERGENCY MEDICINE

## 2022-02-10 PROCEDURE — 85025 COMPLETE CBC W/AUTO DIFF WBC: CPT

## 2022-02-10 PROCEDURE — 80053 COMPREHEN METABOLIC PANEL: CPT

## 2022-02-10 PROCEDURE — 83735 ASSAY OF MAGNESIUM: CPT

## 2022-02-10 PROCEDURE — 6370000000 HC RX 637 (ALT 250 FOR IP): Performed by: EMERGENCY MEDICINE

## 2022-02-10 PROCEDURE — 73502 X-RAY EXAM HIP UNI 2-3 VIEWS: CPT

## 2022-02-10 PROCEDURE — 93010 ELECTROCARDIOGRAM REPORT: CPT | Performed by: INTERNAL MEDICINE

## 2022-02-10 RX ORDER — HYDROCODONE BITARTRATE AND ACETAMINOPHEN 5; 325 MG/1; MG/1
1 TABLET ORAL ONCE
Status: COMPLETED | OUTPATIENT
Start: 2022-02-10 | End: 2022-02-10

## 2022-02-10 RX ORDER — ACETAMINOPHEN 500 MG
1000 TABLET ORAL ONCE
Status: COMPLETED | OUTPATIENT
Start: 2022-02-10 | End: 2022-02-10

## 2022-02-10 RX ORDER — HYDRALAZINE HYDROCHLORIDE 20 MG/ML
5 INJECTION INTRAMUSCULAR; INTRAVENOUS EVERY 6 HOURS PRN
Status: DISCONTINUED | OUTPATIENT
Start: 2022-02-10 | End: 2022-02-11 | Stop reason: HOSPADM

## 2022-02-10 RX ORDER — ONDANSETRON 2 MG/ML
4 INJECTION INTRAMUSCULAR; INTRAVENOUS EVERY 6 HOURS PRN
Status: DISCONTINUED | OUTPATIENT
Start: 2022-02-10 | End: 2022-02-11 | Stop reason: HOSPADM

## 2022-02-10 RX ORDER — ACETAMINOPHEN 650 MG/1
650 SUPPOSITORY RECTAL EVERY 6 HOURS PRN
Status: DISCONTINUED | OUTPATIENT
Start: 2022-02-10 | End: 2022-02-11 | Stop reason: HOSPADM

## 2022-02-10 RX ORDER — SODIUM CHLORIDE 0.9 % (FLUSH) 0.9 %
5-40 SYRINGE (ML) INJECTION EVERY 12 HOURS SCHEDULED
Status: DISCONTINUED | OUTPATIENT
Start: 2022-02-10 | End: 2022-02-11 | Stop reason: HOSPADM

## 2022-02-10 RX ORDER — SODIUM CHLORIDE 0.9 % (FLUSH) 0.9 %
5-40 SYRINGE (ML) INJECTION PRN
Status: DISCONTINUED | OUTPATIENT
Start: 2022-02-10 | End: 2022-02-11 | Stop reason: HOSPADM

## 2022-02-10 RX ORDER — ACETAMINOPHEN 325 MG/1
650 TABLET ORAL EVERY 6 HOURS PRN
Status: DISCONTINUED | OUTPATIENT
Start: 2022-02-10 | End: 2022-02-11 | Stop reason: HOSPADM

## 2022-02-10 RX ORDER — METOPROLOL SUCCINATE 200 MG/1
200 TABLET, EXTENDED RELEASE ORAL DAILY
COMMUNITY
End: 2022-07-27

## 2022-02-10 RX ORDER — SODIUM CHLORIDE 9 MG/ML
25 INJECTION, SOLUTION INTRAVENOUS PRN
Status: DISCONTINUED | OUTPATIENT
Start: 2022-02-10 | End: 2022-02-11 | Stop reason: HOSPADM

## 2022-02-10 RX ADMIN — Medication 10 ML: at 23:00

## 2022-02-10 RX ADMIN — HYDROCODONE BITARTRATE AND ACETAMINOPHEN 1 TABLET: 5; 325 TABLET ORAL at 12:17

## 2022-02-10 RX ADMIN — ACETAMINOPHEN 1000 MG: 500 TABLET ORAL at 18:33

## 2022-02-10 ASSESSMENT — PAIN DESCRIPTION - PAIN TYPE
TYPE: ACUTE PAIN
TYPE: ACUTE PAIN

## 2022-02-10 ASSESSMENT — PAIN DESCRIPTION - LOCATION
LOCATION: BACK
LOCATION: BACK

## 2022-02-10 ASSESSMENT — PAIN SCALES - GENERAL
PAINLEVEL_OUTOF10: 6
PAINLEVEL_OUTOF10: 7
PAINLEVEL_OUTOF10: 4
PAINLEVEL_OUTOF10: 4

## 2022-02-10 ASSESSMENT — PAIN DESCRIPTION - ORIENTATION: ORIENTATION: RIGHT

## 2022-02-10 ASSESSMENT — PAIN DESCRIPTION - FREQUENCY: FREQUENCY: CONTINUOUS

## 2022-02-10 ASSESSMENT — PAIN DESCRIPTION - DESCRIPTORS: DESCRIPTORS: SHARP

## 2022-02-10 NOTE — ED PROVIDER NOTES
Triage Chief Complaint:   Loss of Consciousness    [place]:  Jakob Abrams is a 68 y.o. male that presents for evaluation of syncopal episode. The patient was getting up prepared to go about his day when he had an episode of passing out. He does not know what occurred but woke up on the ground noting pain to the right hip. He was transiently confused but arrives alert awake oriented, GCS 15, AAOx4. Past medical history of hyperlipidemia, hypertension. Arrives stating he \"does not know what happened\". No reported chest pain, fever, chills, headache, neck pain    ROS:  At least 12 systems reviewed and otherwise acutely negative except as in the [address] Past Medical History:   Diagnosis Date    Depression     Hyperlipidemia     Hypertension     Special screening for malignant neoplasm of prostate      Past Surgical History:   Procedure Laterality Date    APPENDECTOMY       History reviewed. No pertinent family history. Social History     Socioeconomic History    Marital status:      Spouse name: Not on file    Number of children: Not on file    Years of education: Not on file    Highest education level: Not on file   Occupational History    Not on file   Tobacco Use    Smoking status: Former Smoker     Packs/day: 1.50     Years: 25.00     Pack years: 45.53     Quit date: 3/26/1988     Years since quittin.9    Smokeless tobacco: Never Used   Substance and Sexual Activity    Alcohol use:  Yes     Alcohol/week: 1.0 standard drink     Types: 1 Cans of beer per week     Comment: 1 beer daily    Drug use: Not on file    Sexual activity: Not on file   Other Topics Concern    Not on file   Social History Narrative    Not on file     Social Determinants of Health     Financial Resource Strain: Low Risk     Difficulty of Paying Living Expenses: Not hard at all   Food Insecurity: No Food Insecurity    Worried About [facility] in the Last Year: Never true    [address] [facility] [address] in the Last Year: Never true   Transportation Needs:     Lack of Transportation (Medical): Not on file    Lack of Transportation (Non-Medical): Not on file   Physical Activity:     Days of Exercise per Week: Not on file    Minutes of Exercise per Session: Not on file   Stress:     Feeling of Stress : Not on file   Social Connections:     Frequency of Communication with Friends and Family: Not on file    Frequency of Social Gatherings with Friends and Family: Not on file    Attends Buddhist Services: Not on file    Active Member of [address] [place] [address] [employer] or Organizations: Not on file    Attends Club or Organization Meetings: Not on file    Marital Status: Not on file   Intimate Partner Violence:     Fear of Current or Ex-Partner: Not on file    Emotionally Abused: Not on file    Physically Abused: Not on file    Sexually Abused: Not on file   Housing Stability:     Unable to Pay for Housing in the Last Year: Not on file    Number of [place] in the Last Year: Not on file    Unstable Housing in the Last Year: Not on file     No current facility-administered medications for this encounter. Current Outpatient Medications   Medication Sig Dispense Refill    hydroCHLOROthiazide (HYDRODIURIL) 25 MG tablet TAKE ONE TABLET BY MOUTH DAILY 90 tablet 3    fenofibrate (TRICOR) 145 MG tablet TAKE ONE TABLET BY MOUTH DAILY 90 tablet 3    amLODIPine-benazepril (LOTREL) 10-20 MG per capsule TAKE ONE CAPSULE BY MOUTH DAILY 90 capsule 3    escitalopram (LEXAPRO) 10 MG tablet Take 1 tablet by mouth daily 30 tablet 3    metoprolol succinate (TOPROL XL) 200 MG extended release tablet TAKE ONE TABLET BY MOUTH DAILY 90 tablet 3     Allergies   Allergen Reactions    Other Swelling     Pain med that starts with a \"D\". Took it in the 60's when leg was broken.        [unfilled]    Nursing Notes Reviewed    Physical Exam:  Vitals:    02/10/22 1011   BP: (!) 185/89   Pulse: 73   Resp: 16   Temp: 98.5 °F (36.9 °C)   SpO2: 98% GENERAL APPEARANCE: Awake and alert. Cooperative. No acute distress. HEAD: Normocephalic. Atraumatic. EYES: EOM's grossly intact. Sclera anicteric. ENT: Mucous membranes are moist. Tolerates saliva. No trismus. NECK: Supple. No meningismus. Trachea midline. HEART: RRR. Radial pulses 2+. LUNGS: Respirations unlabored. CTAB  ABDOMEN: Soft. Non-tender. No guarding or rebound. EXTREMITIES: No acute deformities. SKIN: Warm and dry. NEUROLOGICAL: No gross facial drooping. Moves all 4 extremities spontaneously. PSYCHIATRIC: Normal mood. I have reviewed and interpreted all of the currently available lab results from this visit (if applicable):  No results found for this visit on 02/10/22. Radiographs (if obtained):  [] The following radiograph was interpreted by myself in the absence of a radiologist:  [x] Radiologist's Report Reviewed:          XR HIP RIGHT (2-3 VIEWS) (Final result)  Result time 02/10/22 11:25:14  Final result by Corey Hale MD (02/10/22 11:25:14)                Impression:    No acute osseous abnormality of the pelvis or right hip. Narrative:    EXAMINATION:   TWO XRAY VIEWS OF THE RIGHT HIP     2/10/2022 11:15 am     COMPARISON:   None. HISTORY:   ORDERING SYSTEM PROVIDED HISTORY: fall, pain   TECHNOLOGIST PROVIDED HISTORY:   Reason for exam:->fall, pain   Reason for Exam: fall, pain     FINDINGS:   No acute fracture of the pelvis or right hip.  The SI joints are maintained. 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  No focal soft tissue   abnormality.  Mild degenerative changes apparent in the lower lumbar spine.                       XR CHEST 1 VIEW (Final result)  Result time 02/10/22 11:25:22  Final result by Selam Jorgensen MD (02/10/22 11:25:22)                Impression:    Clear lungs. Narrative:    EXAMINATION:   ONE XRAY VIEW OF THE CHEST     2/10/2022 11:15 am     COMPARISON:   None.      HISTORY:   ORDERING SYSTEM PROVIDED HISTORY: syncope   TECHNOLOGIST PROVIDED HISTORY:   Reason for exam:->syncope   Reason for Exam: syncope     FINDINGS:   Clear lungs. No pleural effusion or pneumothorax. Cardiomediastinal   silhouette is unremarkable. Visualized osseous structures are unremarkable.                       CT HEAD WO CONTRAST (Final result)  Result time 02/10/22 11:37:42  Final result by Mason Maravilla MD (02/10/22 11:37:42)                Impression:    1.  Indeterminate, couple 4-6 mm punctate high-density foci along the   anterior right paramedian falx, image 43 and 45 of series 2.  Differential   for these would include small extra-axial subdural bleed or nonspecific early   calcifications or tiny hyperdense meningiomas.  There are no prior   comparisons available.  Correlate clinically and follow-up CT may be obtained   in 12-24 hours to evaluate for change/stability. 2.  No intraparenchymal bleed.  Mild senescent changes with parenchymal   volume loss and chronic small vessel ischemic changes. Communications: The above critical findings were communicated personally by   Dr. Mason Maravilla to  at 11:26 a.m. 02/10/2022. RECOMMENDATIONS:   Follow-up head CT in 12-24 hours may be obtained. Narrative:    EXAMINATION:   CT OF THE HEAD WITHOUT CONTRAST  2/10/2022 10:52 am     TECHNIQUE:   CT of the head was performed without the administration of intravenous   contrast. Dose modulation, iterative reconstruction, and/or weight based   adjustment of the mA/kV was utilized to reduce the radiation dose to as low   as reasonably achievable. COMPARISON:   None. HISTORY:   ORDERING SYSTEM PROVIDED HISTORY: syncope and fall   TECHNOLOGIST PROVIDED HISTORY:   Reason for exam:->syncope and fall   Has a \"code stroke\" or \"stroke alert\" been called? ->No   Decision Support Exception - unselect if not a suspected or confirmed   emergency medical condition->Emergency Medical Condition (MA)     FINDINGS: BRAIN/VENTRICLES: Couple punctate high-density foci noted along the anterior   right paramedian falx, image 43 and image 45 of series 2.  No prior   comparisons available.  Differential for these would include small   extra-axial subdural bleed or nonspecific early calcifications or tiny   hyperdense meningiomas. No intraparenchymal bleed noted in the brain.  Mild senescent changes with   parenchymal volume loss and chronic small vessel ischemic changes.  .   Ventricles are midline and symmetric. ORBITS: The visualized portion of the orbits demonstrate no acute abnormality. SINUSES: The visualized paranasal sinuses and mastoid air cells demonstrate   no acute abnormality. SOFT TISSUES/SKULL:  No acute abnormality of the visualized skull or soft   tissues.                       CT CERVICAL SPINE WO CONTRAST (Final result)  Result time 02/10/22 11:24:11  Final result by Everett Isabel MD (02/10/22 11:24:11)                Impression:    No acute abnormality of the cervical spine. 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 Narrative:    EXAMINATION:   CT OF THE CERVICAL SPINE WITHOUT CONTRAST 2/10/2022 10:52 am     TECHNIQUE:   CT of the cervical spine was performed without the administration of   intravenous contrast. Multiplanar reformatted images are provided for review. Dose modulation, iterative reconstruction, and/or weight based adjustment of   the mA/kV was utilized to reduce the radiation dose to as low as reasonably   achievable. COMPARISON:   None.      HISTORY:   ORDERING SYSTEM PROVIDED HISTORY: fall   TECHNOLOGIST PROVIDED HISTORY:   Reason for exam:->fall   Decision Support Exception - unselect if not a suspected or confirmed   emergency medical condition->Emergency Medical Condition (MA)   Reason for Exam: fall, syncope     FINDINGS:   BONES/ALIGNMENT: There is no acute fracture or traumatic malalignment. DEGENERATIVE CHANGES: There is prominent ossification of the posterior   longitudinal ligament particularly at the C2 through C4 level as well as at   C5-6.  There is resultant acquired spinal stenosis, most severe at C2-3 and   C5-6.  Mild degenerative narrowing at C5-6.  Multilevel degenerative spurring   anteriorly.  Mild facet arthropathy.  Mild degenerative change at the C1-2   articulation. SOFT TISSUES: There is no prevertebral soft tissue swelling.  Mild calcified   plaque at the level of the carotid bifurcation bilaterally. EKG (if obtained): (All EKG's are interpreted by myself in the absence of a cardiologist)  Initial EKG on my interpretation shows NR rate 75 w no LEVY  . MDM:  Differential diagnosis: syncope, ICH, seizure, electrolyte abn    Labs reassuring  EKG/ trop now c/w ACS  Ct head shows \"indeterminate, couple 4-6 mm punctate high-density foci along the anterior right paramedian falx, image 43 and 45 of series 2.  Differential for these would include small extra-axial subdural bleed or nonspecific early calcifications or tiny hyperdense meningiomas. \" per rads. Neurosx consulted and I d/w Alfredo Torres who states that if repeat CT in 6 hours after initial shows no change, likely no bleed and can stay at [place], if changes noted will need transfer to [facility]. Patient signed out to Dr. Jona Badillo with plan pending repeat CT at [address] [place] [address],[address].  Please see his documenation     Old records reviewed. Labs and imaging reviewed and results discussed with patient. .        Patient was given scripts for the following medications. I counseled patient how to take these medications. New Prescriptions    No medications on file         CRITICAL CARE TIME   Total Critical Care time was 0 minutes, excluding separately reportable procedures.   There was a high probability of clinically significant/life threatening deterioration in the patient's condition which required my urgent intervention.       Clinical Impression:  Syncope, abnormal head CT  (Please note that portions of this note may have been completed with a voice recognition program. Efforts were made to edit the dictations but occasionally words are mis-transcribed.)    MD Russ Whittaker MD  02/11/22 1481

## 2022-02-10 NOTE — PROGRESS NOTES
Pharmacy Medication Reconciliation Note     List of medications patient is currently taking is complete. Source of information:   1. Patient   2. EMR    Notes regarding home medications:   1. Patient did not receive any of his home medication doses today before presenting to the ER.       [id number] Erica, Pharmacy Intern  2/10/2022 11:55 AM

## 2022-02-10 NOTE — ED TRIAGE NOTES
Patient remembers getting out of bed getting dressed to go to [facility] woke up on ground outside confused with pain in right lower back

## 2022-02-10 NOTE — PLAN OF CARE
NEUROSURGERY PROGRESS NOTE    Nicolle Porter  [id number]   1945   2/10/2022    ED physician: Loki Sevilla MD    Reason for call: Abnormal CT Head    History of present illness: Patient is a 68 y.o. male that  has a past medical history of Depression, Hyperlipidemia, Hypertension, and Special screening for malignant neoplasm of prostate. Patient presented on 2/10/2022 to [facility] ED s/p syncopal episode. According to Dr. Angelito Banks, the patient had an episode of passing out. Patient does not know what occurred but woke up on the ground noting pain in the right hip. He was transiently confused but arrived in the ED awake and A&Ox4. No reported chest pain, fever, chills, headache, neck pain. Physical Exam:     BP (!) 185/89   Pulse 73   Temp 98.5 °F (36.9 °C) (Oral)   Resp 16   Ht 5' 10\" (1.778 m)   Wt 234 lb 6.4 oz (106.3 kg)   SpO2 98%   BMI 33.63 kg/m²   GCS per ED physician:  4 - Opens eyes on own  5 - Alert and oriented  6 - Follows simple motor commands    Neuro intact with c/o right buttock pain    Radiological Findings:  CT HEAD WO CONTRAST  Result Date: 2/10/2022  1. Indeterminate, couple 4-6 mm punctate high-density foci along the anterior right paramedian falx, image 43 and 45 of series 2. Differential for these would include small extra-axial subdural bleed or nonspecific early calcifications or tiny hyperdense meningiomas. There are no prior comparisons available. Correlate clinically and follow-up CT may be obtained in 12-24 hours to evaluate for change/stability. 2.  No intraparenchymal bleed. Mild senescent changes with parenchymal volume loss and chronic small vessel ischemic changes. CT CERVICAL SPINE WO CONTRAST  Result Date: 2/10/2022  No acute abnormality of the cervical spine. 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      Assessment:  Patient is a 68 y.o. y/o male w/    Recommendations:  1. Neurologic exams frequency: Q4H  2. For change in exam MUST contact neurosurgery team  3. CT Head w/o contrast 6 hours from previous scan, if MRI not done prior to 6 hour timeframe  4. MRI Brain wo r/o hemorrhage  5. MRI Cervical wo r/o cord compression  6. Maintain SBP <160; If PRN med insufficient, then may start Nicardipine infusion  7. Keep Plt >100k & INR <1.4  8. Hold all anticoagulation & antiplatelet for 2 weeks  9. DVT Prophylaxis: SCD's  10. Detailed consult when patient arrives. DISPO: If hemorrhage or cord compression seen on MRI scans then transfer to [facility] with hospitalist as attending physician and Dr. Araseli Pena as consulting neurosurgeon. If no hemorrhage or cord compression seen on scans, then patient can stay at [facility] with no neurosurgery consult needed.      Electronically signed by: POONAM Fernandez CNP, APRN-CNP, 2/10/2022 11:59 AM  [phone]

## 2022-02-10 NOTE — ED NOTES
Bed: B-10  Expected date:   Expected time:   Means of arrival:   Comments:  [facility] 76m fall, back pain     Artem Galarza RN  02/10/22 4669

## 2022-02-10 NOTE — ED PROVIDER NOTES
Emergency Department Encounter  Location: [facility] EMERGENCY DEPARTMENT    Patient: Suzy Parker  MRN: [id number]  : 1945  Date of evaluation: 2/10/2022  ED Provider: Jorje Hernandez MD    3 PM  Suzy Parker was checked out to me by Dr. Marilin Li. Please see his/her initial documentation for details of the patient's initial ED presentation, physical exam and completed studies. In brief, Suzy Parker is a 68 y.o. male that presented to the emergency department after syncopal episode. Patient to be admitted for syncope work-up however CT head showed some concern for intracranial bleed. After consult with neurosurgery there is some indication that this bleed which is on the CT read may be incorrect. Neurosurgery recommends 6-hour repeat head scan which would take place at 5 PM.  After that head scan neurosurgery will be reconsulted and if necessary patient will be transferred to [facility]note 3] or patient will be admitted here for further syncope work-up.     I have reviewed and interpreted all of the currently available lab results and diagnostics from this visit:  Results for orders placed or performed during the hospital encounter of 02/10/22   CBC Auto Differential   Result Value Ref Range    WBC 11.5 (H) 4.0 - 11.0 K/uL    RBC 5.88 4.20 - 5.90 M/uL    Hemoglobin 18.2 (H) 13.5 - 17.5 g/dL    Hematocrit 52.6 (H) 40.5 - 52.5 %    MCV 89.3 80.0 - 100.0 fL    MCH 30.9 26.0 - 34.0 pg    MCHC 34.6 31.0 - 36.0 g/dL    RDW 13.7 12.4 - 15.4 %    Platelets 393 694 - 355 K/uL    MPV 8.3 5.0 - 10.5 fL    PLATELET SLIDE REVIEW Adequate     SLIDE REVIEW see below     Neutrophils % 75.2 %    Lymphocytes % 11.9 %    Monocytes % 10.6 %    Eosinophils % 1.3 %    Basophils % 1.0 %    Neutrophils Absolute 8.7 (H) 1.7 - 7.7 K/uL    Lymphocytes Absolute 1.4 1.0 - 5.1 K/uL    Monocytes Absolute 1.2 0.0 - 1.3 K/uL    Eosinophils Absolute 0.1 0.0 - 0.6 K/uL    Basophils Absolute 0.1 0.0 - 0.2 K/uL 2/10/2022  EXAMINATION: TWO XRAY VIEWS OF THE RIGHT HIP 2/10/2022 11:15 am COMPARISON: None. HISTORY: ORDERING SYSTEM PROVIDED HISTORY: fall, pain TECHNOLOGIST PROVIDED HISTORY: Reason for exam:->fall, pain Reason for Exam: fall, pain FINDINGS: No acute fracture of the pelvis or right hip. The SI joints are maintained. 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 No focal soft tissue abnormality. Mild degenerative changes apparent in the lower lumbar spine. No acute osseous abnormality of the pelvis or right hip. CT HEAD WO CONTRAST    Result Date: 2/10/2022  EXAMINATION: CT OF THE HEAD WITHOUT CONTRAST  2/10/2022 5:09 pm TECHNIQUE: CT of the head was performed without the administration of intravenous contrast. Dose modulation, iterative reconstruction, and/or weight based adjustment of the mA/kV was utilized to reduce the radiation dose to as low as reasonably achievable. COMPARISON: CT head same day. HISTORY: ORDERING SYSTEM PROVIDED HISTORY: PLEASE DO AT 5PM AS REPEAT TO COMPARE TO PRIOR TECHNOLOGIST PROVIDED HISTORY: Reason for exam:->PLEASE DO AT 5PM AS REPEAT TO COMPARE TO PRIOR Has a \"code stroke\" or \"stroke alert\" been called? ->No Decision Support Exception - unselect if not a suspected or confirmed emergency medical condition->Emergency Medical Condition (MA) Reason for Exam: repeat to compare to prior ct head scan without contrast FINDINGS: BRAIN/VENTRICLES: Densities along the falx appears similar to prior exam. Periventricular and subcortical hypoattenuation is nonspecific and may be related to microvascular disease. Artifact partially obscures the nay. Artifact partially obscures the inferior cerebellum. Tariq Del Carmen white differentiation appears maintained given artifact near the skull base and through the posterior fossa. Cerebral volume loss and minimal prominence of the ventricles again visualized. There is no midline shift. Basal cisterns appear patent.  ORBITS: Visualized orbits appear unremarkable on this unenhanced exam. SINUSES: Mild mucosal thickening of the ethmoid and frontal sinuses. Visualized mastoid air cells appear clear. SOFT TISSUES/SKULL: No depressed calvarial fracture. Right parietal scalp edema. Densities along the falx appear similar to prior exam. Other findings as described. CT HEAD WO CONTRAST    Result Date: 2/10/2022  EXAMINATION: CT OF THE HEAD WITHOUT CONTRAST  2/10/2022 10:52 am TECHNIQUE: CT of the head was performed without the administration of intravenous contrast. Dose modulation, iterative reconstruction, and/or weight based adjustment of the mA/kV was utilized to reduce the radiation dose to as low as reasonably achievable. COMPARISON: None. HISTORY: ORDERING SYSTEM PROVIDED HISTORY: syncope and fall TECHNOLOGIST PROVIDED HISTORY: Reason for exam:->syncope and fall Has a \"code stroke\" or \"stroke alert\" been called? ->No Decision Support Exception - unselect if not a suspected or confirmed emergency medical condition->Emergency Medical Condition (MA) FINDINGS: BRAIN/VENTRICLES: Couple punctate high-density foci noted along the anterior right paramedian falx, image 43 and image 45 of series 2. No prior comparisons available. Differential for these would include small extra-axial subdural bleed or nonspecific early calcifications or tiny hyperdense meningiomas. No intraparenchymal bleed noted in the brain. Mild senescent changes with parenchymal volume loss and chronic small vessel ischemic changes. . Ventricles are midline and symmetric. ORBITS: The visualized portion of the orbits demonstrate no acute abnormality. SINUSES: The visualized paranasal sinuses and mastoid air cells demonstrate no acute abnormality. SOFT TISSUES/SKULL:  No acute abnormality of the visualized skull or soft tissues. 1.  Indeterminate, couple 4-6 mm punctate high-density foci along the anterior right paramedian falx, image 43 and 45 of series 2.   Differential for these would include small extra-axial subdural bleed or nonspecific early calcifications or tiny hyperdense meningiomas. There are no prior comparisons available. Correlate clinically and follow-up CT may be obtained in 12-24 hours to evaluate for change/stability. 2.  No intraparenchymal bleed. Mild senescent changes with parenchymal volume loss and chronic small vessel ischemic changes. Communications: The above critical findings were communicated personally by Dr. Kwasi Pelaez to  at 11:26 a.m. 02/10/2022. RECOMMENDATIONS: Follow-up head CT in 12-24 hours may be obtained. CT CERVICAL SPINE WO CONTRAST    Result Date: 2/10/2022  EXAMINATION: CT OF THE CERVICAL SPINE WITHOUT CONTRAST 2/10/2022 10:52 am TECHNIQUE: CT of the cervical spine was performed without the administration of intravenous contrast. Multiplanar reformatted images are provided for review. Dose modulation, iterative reconstruction, and/or weight based adjustment of the mA/kV was utilized to reduce the radiation dose to as low as reasonably achievable. COMPARISON: None. HISTORY: ORDERING SYSTEM PROVIDED HISTORY: fall TECHNOLOGIST PROVIDED HISTORY: Reason for exam:->fall Decision Support Exception - unselect if not a suspected or confirmed emergency medical condition->Emergency Medical Condition (MA) Reason for Exam: fall, syncope FINDINGS: BONES/ALIGNMENT: There is no acute fracture or traumatic malalignment. DEGENERATIVE CHANGES: There is prominent ossification of the posterior longitudinal ligament particularly at the C2 through C4 level as well as at C5-6. There is resultant acquired spinal stenosis, most severe at C2-3 and C5-6. Mild degenerative narrowing at C5-6. Multilevel degenerative spurring anteriorly. Mild facet arthropathy. Mild degenerative change at the C1-2 articulation. SOFT TISSUES: There is no prevertebral soft tissue swelling. Mild calcified plaque at the level of the carotid bifurcation bilaterally.      No acute abnormality of the cervical spine. 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 XR CHEST 1 VIEW    Result Date: 2/10/2022  EXAMINATION: ONE XRAY VIEW OF THE CHEST 2/10/2022 11:15 am COMPARISON: None. HISTORY: ORDERING SYSTEM PROVIDED HISTORY: syncope TECHNOLOGIST PROVIDED HISTORY: Reason for exam:->syncope Reason for Exam: syncope FINDINGS: Clear lungs. No pleural effusion or pneumothorax. Cardiomediastinal silhouette is unremarkable. Visualized osseous structures are unremarkable. Clear lungs. Final ED Course and MDM: In brief, Liane Henderson is a 68 y.o. male whose care was signed out to me by the outgoing provider. In brief, throughout the patient's stay he had normal vital signs and no further episodes of syncope. Repeat CT head at 5 PM shows no significant change. I reconsulted neurosurgery with Dr. Karina Borja who does not believe that he needs neurosurgical intervention or further work-up at this time. Patient be admitted for further syncope work-up    ED Medication Orders (From admission, onward)    Start Ordered     Status Ordering Provider    02/10/22 1845 02/10/22 1830  acetaminophen (TYLENOL) tablet 1,000 mg  ONCE         Last MAR action: Given - by Stefany Steele on 02/10/22 at [facility]    02/10/22 1215 02/10/22 1201  HYDROcodone-acetaminophen (NORCO) 5-325 MG per tablet 1 tablet  ONCE         Last MAR action: Given - by Stefany Steele on 02/10/22 at 1217 Stefany Steele          Final Impression      1. Syncope and collapse    2.  Closed head injury, initial encounter        DISPOSITION Decision To Admit 02/10/2022 07:25:42 PM     (Please note that portions of this note may have been completed with a voice recognition program. Efforts were made to edit the dictations but occasionally words are mis-transcribed.)    Abdon Perez MD  [place] [id number], MD  02/10/22 Kirk Castillo MD  02/10/22 5229

## 2022-02-11 ENCOUNTER — APPOINTMENT (OUTPATIENT)
Dept: MRI IMAGING | Age: 77
DRG: 312 | End: 2022-02-11
Payer: COMMERCIAL

## 2022-02-11 VITALS
BODY MASS INDEX: 32.13 KG/M2 | OXYGEN SATURATION: 93 % | HEART RATE: 82 BPM | DIASTOLIC BLOOD PRESSURE: 85 MMHG | WEIGHT: 224.43 LBS | HEIGHT: 70 IN | RESPIRATION RATE: 18 BRPM | SYSTOLIC BLOOD PRESSURE: 143 MMHG | TEMPERATURE: 97.4 F

## 2022-02-11 LAB
ANION GAP SERPL CALCULATED.3IONS-SCNC: 16 MMOL/L (ref 3–16)
BASOPHILS ABSOLUTE: 0.1 K/UL (ref 0–0.2)
BASOPHILS RELATIVE PERCENT: 0.7 %
BUN BLDV-MCNC: 13 MG/DL (ref 7–20)
CALCIUM SERPL-MCNC: 9.2 MG/DL (ref 8.3–10.6)
CHLORIDE BLD-SCNC: 100 MMOL/L (ref 99–110)
CO2: 20 MMOL/L (ref 21–32)
CREAT SERPL-MCNC: 0.9 MG/DL (ref 0.8–1.3)
EOSINOPHILS ABSOLUTE: 0.3 K/UL (ref 0–0.6)
EOSINOPHILS RELATIVE PERCENT: 3.6 %
GFR AFRICAN AMERICAN: >60
GFR NON-AFRICAN AMERICAN: >60
GLUCOSE BLD-MCNC: 106 MG/DL (ref 70–99)
GLUCOSE BLD-MCNC: 123 MG/DL (ref 70–99)
GLUCOSE BLD-MCNC: 143 MG/DL (ref 70–99)
HCT VFR BLD CALC: 48.2 % (ref 40.5–52.5)
HEMOGLOBIN: 17 G/DL (ref 13.5–17.5)
LV EF: 63 %
LVEF MODALITY: NORMAL
LYMPHOCYTES ABSOLUTE: 2 K/UL (ref 1–5.1)
LYMPHOCYTES RELATIVE PERCENT: 23.4 %
MAGNESIUM: 2.1 MG/DL (ref 1.8–2.4)
MCH RBC QN AUTO: 31.3 PG (ref 26–34)
MCHC RBC AUTO-ENTMCNC: 35.3 G/DL (ref 31–36)
MCV RBC AUTO: 88.6 FL (ref 80–100)
MONOCYTES ABSOLUTE: 1.2 K/UL (ref 0–1.3)
MONOCYTES RELATIVE PERCENT: 14.9 %
NEUTROPHILS ABSOLUTE: 4.8 K/UL (ref 1.7–7.7)
NEUTROPHILS RELATIVE PERCENT: 57.4 %
PDW BLD-RTO: 13.5 % (ref 12.4–15.4)
PERFORMED ON: ABNORMAL
PERFORMED ON: ABNORMAL
PLATELET # BLD: 215 K/UL (ref 135–450)
PMV BLD AUTO: 7.4 FL (ref 5–10.5)
POTASSIUM SERPL-SCNC: 3.7 MMOL/L (ref 3.5–5.1)
RBC # BLD: 5.44 M/UL (ref 4.2–5.9)
SODIUM BLD-SCNC: 136 MMOL/L (ref 136–145)
WBC # BLD: 8.4 K/UL (ref 4–11)

## 2022-02-11 PROCEDURE — 6360000002 HC RX W HCPCS: Performed by: STUDENT IN AN ORGANIZED HEALTH CARE EDUCATION/TRAINING PROGRAM

## 2022-02-11 PROCEDURE — 36415 COLL VENOUS BLD VENIPUNCTURE: CPT

## 2022-02-11 PROCEDURE — 2580000003 HC RX 258: Performed by: STUDENT IN AN ORGANIZED HEALTH CARE EDUCATION/TRAINING PROGRAM

## 2022-02-11 PROCEDURE — 93306 TTE W/DOPPLER COMPLETE: CPT

## 2022-02-11 PROCEDURE — 85025 COMPLETE CBC W/AUTO DIFF WBC: CPT

## 2022-02-11 PROCEDURE — G0378 HOSPITAL OBSERVATION PER HR: HCPCS

## 2022-02-11 PROCEDURE — A9577 INJ MULTIHANCE: HCPCS | Performed by: STUDENT IN AN ORGANIZED HEALTH CARE EDUCATION/TRAINING PROGRAM

## 2022-02-11 PROCEDURE — 80048 BASIC METABOLIC PNL TOTAL CA: CPT

## 2022-02-11 PROCEDURE — 6370000000 HC RX 637 (ALT 250 FOR IP): Performed by: STUDENT IN AN ORGANIZED HEALTH CARE EDUCATION/TRAINING PROGRAM

## 2022-02-11 PROCEDURE — 83735 ASSAY OF MAGNESIUM: CPT

## 2022-02-11 PROCEDURE — 6360000004 HC RX CONTRAST MEDICATION: Performed by: STUDENT IN AN ORGANIZED HEALTH CARE EDUCATION/TRAINING PROGRAM

## 2022-02-11 PROCEDURE — 96372 THER/PROPH/DIAG INJ SC/IM: CPT

## 2022-02-11 PROCEDURE — 94761 N-INVAS EAR/PLS OXIMETRY MLT: CPT

## 2022-02-11 PROCEDURE — 70553 MRI BRAIN STEM W/O & W/DYE: CPT

## 2022-02-11 RX ADMIN — Medication 10 ML: at 09:03

## 2022-02-11 RX ADMIN — ACETAMINOPHEN 650 MG: 325 TABLET ORAL at 04:16

## 2022-02-11 RX ADMIN — ACETAMINOPHEN 650 MG: 325 TABLET ORAL at 11:27

## 2022-02-11 RX ADMIN — ENOXAPARIN SODIUM 40 MG: 100 INJECTION SUBCUTANEOUS at 09:03

## 2022-02-11 RX ADMIN — GADOBENATE DIMEGLUMINE 20 ML: 529 INJECTION, SOLUTION INTRAVENOUS at 10:30

## 2022-02-11 ASSESSMENT — PAIN SCALES - GENERAL
PAINLEVEL_OUTOF10: 0
PAINLEVEL_OUTOF10: 4
PAINLEVEL_OUTOF10: 3

## 2022-02-11 ASSESSMENT — PAIN DESCRIPTION - ORIENTATION: ORIENTATION: RIGHT

## 2022-02-11 ASSESSMENT — PAIN DESCRIPTION - LOCATION: LOCATION: BACK

## 2022-02-11 ASSESSMENT — PAIN DESCRIPTION - PAIN TYPE: TYPE: ACUTE PAIN

## 2022-02-11 NOTE — H&P
Hospital Medicine History & Physical      PCP: Stephane Rodriguez MD    Date of Admission: 2/10/2022    Date of Service: Pt seen/examined on 2/10/2022 and Admitted to Inpatient     Chief Complaint: Syncope, uncertain seizure versus other process      History Of Present Illness: The patient is a 68 y.o. male with past ministry of hypertension, hyperlipidemia, depression who presents to [facility] with concern per patient that apparently while he was at home he does remember getting dressed for mass by getting on his coat but then afterwards apparently found himself on the ground with pain to his right hip and uncertain as to what may have happened to him. He lives alone at home and no one witnessed the event. He notes that he initially felt somewhat confused and was not able to piece together what happened to him. He did apparently contact his daughter and he notes that he was able to speak with her somewhat and she directed him to call EMS. EMS brought patient to the ED and patient now seems to be more alert and oriented upon initial presentation in the ED. Uncertain whether patient had any seizure-like activity but he denies any notable symptoms of chest pain or shortness of breath or lightheadedness this morning or prior to the event. He is uncertain as to how long he was unconscious for  but he does not remember how he wound up on the ground. He has no history of seizures and no other history of strokes or other neurologic processes. He denies any previous history of heart attacks or irregular heartbeats. He denies any other recent symptoms of fever, chills, dizziness, chest pain, shortness of breath, dysuria, cough, blood in the urine/stool/sputum, nausea/vomiting/diarrhea/abdominal pain/poor appetite, leg swelling.   He notes that this is 1 year or little more than after the anniversary of the passing of his wife. He went over in some detail as to how his wife found up in the hospital and how she went up on hospice. He has been seeing counseling for his depression in regards to his grief. He wonders if possibly his symptoms might be secondary due to this. CT imaging of his head did notice some weird hyperdensity in the paramedian falx that potentially could have been consistent with subdural bleed but patient is completely alert and oriented and without any focal neurologic deficits at this time. Neurosurgery was already notified by ED provider and noted that repeat CT head was again unchanged so very low suspicion for bleeding and that neurosurgery felt that patient could be managed here in this facility. Past Medical History:        Diagnosis Date    Depression     Hyperlipidemia     Hypertension     Special screening for malignant neoplasm of prostate        Past Surgical History:        Procedure Laterality Date    APPENDECTOMY  1984       Medications Prior to Admission:    Prior to Admission medications    Medication Sig Start Date End Date Taking? Authorizing Provider   metoprolol succinate (TOPROL XL) 200 MG extended release tablet Take 200 mg by mouth daily   Yes Historical Provider, MD NEVILLE WORT PO Take 1 tablet by mouth 2 times daily   Yes Historical Provider, MD   hydroCHLOROthiazide (HYDRODIURIL) 25 MG tablet TAKE ONE TABLET BY MOUTH DAILY 10/20/21  Yes Elvie Hawk MD   fenofibrate (TRICOR) 145 MG tablet TAKE ONE TABLET BY MOUTH DAILY 7/7/21  Yes Elvie Hawk MD   amLODIPine-benazepril (LOTREL) 10-20 MG per capsule TAKE ONE CAPSULE BY MOUTH DAILY 7/2/21  Yes Elvie Hawk MD   escitalopram (LEXAPRO) 10 MG tablet Take 1 tablet by mouth daily 7/2/21  Yes POONAM Foley NP       Allergies: Other    Social History:  The patient currently lives home    TOBACCO:   reports that he quit smoking about 33 years ago.  He acute abnormality of the cervical spine. 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 CT head without contrast:  1.  Indeterminate, couple 4-6 mm punctate high-density foci along the   anterior right paramedian falx, image 43 and 45 of series 2.  Differential   for these would include small extra-axial subdural bleed or nonspecific early   calcifications or tiny hyperdense meningiomas.  There are no prior   comparisons available.  Correlate clinically and follow-up CT may be obtained   in 12-24 hours to evaluate for change/stability.       2.  No intraparenchymal bleed.  Mild senescent changes with parenchymal   volume loss and chronic small vessel ischemic changes. Repeat CT head without contrast:  FINDINGS:   BRAIN/VENTRICLES: Densities along the falx appears similar to prior exam.   Periventricular and subcortical hypoattenuation is nonspecific and may be   related to microvascular disease.       Artifact partially obscures the nay.       Artifact partially obscures the inferior cerebellum.       Kristin Matt white differentiation appears maintained given artifact near the skull   base and through the posterior fossa.  Cerebral volume loss and minimal   prominence of the ventricles again visualized.  There is no midline shift. Basal cisterns appear patent.       ORBITS: Visualized orbits appear unremarkable on this unenhanced exam.       SINUSES: Mild mucosal thickening of the ethmoid and frontal sinuses. Visualized mastoid air cells appear clear.       SOFT TISSUES/SKULL: No depressed calvarial fracture.  Right parietal scalp   edema.           Impression   Densities along the falx appear similar to prior exam.       Other findings as described.          CBC   Recent Labs     02/10/22  1005 02/10/22  2148 02/11/22  0420   WBC 11.5* 9.6 8.4   HGB 18.2* 16.2 17.0   HCT 52.6* 46.7 48.2    208 215 RENAL  Recent Labs     02/10/22  1005 02/10/22  2148 02/11/22  0420   * 134* 136   K 4.6 4.1 3.7   CL 98* 100 100   CO2 22 20* 20*   BUN 14 13 13   CREATININE 0.8 0.8 0.9     LFT'S  Recent Labs     02/10/22  1005 02/10/22  2148   AST 43* 39*   ALT 21 18   BILITOT 0.6 0.8   ALKPHOS 71 68     COAG  Recent Labs     02/10/22  1123   INR 0.93     CARDIAC ENZYMES  Recent Labs     02/10/22  1005 02/10/22  2148   TROPONINI <0.01 <0.01       U/A:    Lab Results   Component Value Date    COLORU YELLOW 02/10/2022    WBCUA 2 02/10/2022    RBCUA 3 02/10/2022    CLARITYU Clear 02/10/2022    SPECGRAV 1.019 02/10/2022    LEUKOCYTESUR Negative 02/10/2022    BLOODU Negative 02/10/2022    GLUCOSEU Negative 02/10/2022       ABG  No results found for: IRU6PUS, BEART, I1QAGEQB, PHART, THGBART, GYJ9YFV, PO2ART, ARL2DWE        Active Hospital Problems    Diagnosis Date Noted    Syncope and collapse [R55] 02/10/2022    Hyperlipidemia [E78.5]     Hypertension [I10]          PHYSICIANS CERTIFICATION:    I certify that [place] is expected to be hospitalized for greater than 2 midnights based on the following assessment and plan:      ASSESSMENT/PLAN:  · Syncope  · Hyperlipidemia  · Hypertension    Plan:  · Patient syncope is of uncertain etiology, neurosurgery was already notified about CT head findings of possible indeterminant density foci along the paramedian falx but does not seem to be consistent with a hematoma but could also be potentially consistent with subdural bleed but could also be a possible mass. Repeat CT scan did not show any worsening of the area.   Patient currently is alert and oriented x4 and has no history of seizure but has no other symptoms to suggest possible cardiac etiology but the episode in question was unwitnessed  · Slight leukocytosis noted but do not suspect an infectious process at this time, hyponatremia is mild and does not seem to be contributory  · We will obtain MRI of the brain with and without contrast in the morning  · Ordering transthoracic echo in the morning  · Orthostatic vitals every shift  · Maintain on telemetry  · Neurology consult for syncope and abnormal CT head findings  · Neurochecks every 4  · Seizure precautions  · Repeat labs daily with CBC/BMP/magnesium  · Hydralazine as needed for hypertension    DVT Prophylaxis: Lovenox  Diet: ADULT DIET; Regular; 4 carb choices (60 gm/meal); Low Sodium (2 gm)  Code Status: Full Code  PT/OT Eval Status: Ambulatory    Dispo -pending clinical course       Donte Arita DO    Thank you Lyndsay Villanueva MD for the opportunity to be involved in this patient's care. If you have any questions or concerns please feel free to contact me at [phone].

## 2022-02-11 NOTE — CONSULTS
Neurology Consult Note  Reason for Consult: syncope    Chief complaint: not sure what happened, disoriented    Dr Clyde Nielson MD asked me to see Yoli Pillai in consultation for evaluation of syncope    History of Present Illness:  Yoli Pillai is a 68 y.o. male who presents with altered mental status. I obtained my information via interview w/ the patient, supplemented by chart review. Around 0800 yesterday the patient says that he went outside in order to go to [facility] and that is essentially the last thing he recalls before being back in his house. He really has no idea how much time had passed before he was back inside. He was feeling confused and disoriented at that point and called his daughter he thinks around [phone] to advise her of that. He did not bite his tongue. He did recognize that his pants were wet though not from urine. He ended up coming to the ED around 0950 in order to be evaluated. The event was unwitnessed. Initial BP was 185/89. CT head w/ questionable hyperdensities. Neurosurgery is involved. Neurologically he was and remains intact. He says when he was in grade school he had some sort of event that he though resembled seizure. The details are vague. He has a remote hx of migraines. He has been dealing w/ immense stress and grief from the passing of his wife about a year ago.       Medical History:  Past Medical History:   Diagnosis Date    Depression     Hyperlipidemia     Hypertension     Special screening for malignant neoplasm of prostate      Past Surgical History:   Procedure Laterality Date    APPENDECTOMY  1984     Scheduled Meds:   sodium chloride flush  5-40 mL IntraVENous 2 times per day    enoxaparin  40 mg SubCUTAneous Daily     Medications Prior to Admission:   metoprolol succinate (TOPROL XL) 200 MG extended release tablet, Take 200 mg by mouth daily  ST ALEXIS WORT PO, Take 1 tablet by mouth 2 times daily  hydroCHLOROthiazide (HYDRODIURIL) 25 MG tablet, TAKE ONE TABLET BY MOUTH DAILY  fenofibrate (TRICOR) 145 MG tablet, TAKE ONE TABLET BY MOUTH DAILY  amLODIPine-benazepril (LOTREL) 10-20 MG per capsule, TAKE ONE CAPSULE BY MOUTH DAILY  escitalopram (LEXAPRO) 10 MG tablet, Take 1 tablet by mouth daily    Allergies   Allergen Reactions    Other Swelling     Pain med that starts with a \"D\". Took it in the 60's when leg was broken. History reviewed. No pertinent family history. Social History     Tobacco Use   Smoking Status Former Smoker    Packs/day: 1.50    Years: 25.00    Pack years: 45.53    Quit date: 3/26/1988    Years since quittin.9   Smokeless Tobacco Never Used     Social History     Substance and Sexual Activity   Drug Use Not on file     Social History     Substance and Sexual Activity   Alcohol Use Yes    Alcohol/week: 1.0 standard drink    Types: 1 Cans of beer per week    Comment: 1 beer daily     ROS  Constitutional- No weight loss or fevers  Eyes- No diplopia. No photophobia. Ears/nose/throat- No dysphagia. No Dysarthria  Cardiovascular- No palpitations. No chest pain  Respiratory- No dyspnea. No Cough  Gastrointestinal- No Abdominal pain. No Vomiting. Genitourinary- No incontinence. No urinary retention  Musculoskeletal- No myalgia. No arthralgia  Skin- No rash. No easy bruising. Psychiatric- + depression. No anxiety  Endocrine- No diabetes. No thyroid issues. Hematologic- No bleeding difficulty. No fatigue  Neurologic- No weakness. No Headache. Exam  Blood pressure (!) 185/96, pulse 86, temperature 97.9 °F (36.6 °C), temperature source Oral, resp. rate 18, height 5' 10\" (1.778 m), weight 224 lb 6.9 oz (101.8 kg), SpO2 95 %. Constitutional    Vital signs: BP, HR, and RR reviewed   General alert, no distress  Eyes: unable to visualize the fundi  Cardiovascular: no peripheral edema. Psychiatric: cooperative with examination, no psychotic behavior noted.   Neurologic  Mental status:   orientation to person, place, time. General fund of knowledge grossly intact   Memory grossly intact   Attention intact as able to attend well to the exam     Language fluent in conversation   Comprehension intact; follows simple commands  Cranial nerves:   CN2: visual fields full    CN 3,4,6: extraocular muscles intact  CN5: facial sensation symmetric   CN7: face symmetric without dysarthria  CN8: hearing grossly intact  CN9: palate elevated symmetrically  CN11: trap full strength on shoulder shrug  CN12: tongue midline with protrusion  Strength: good strength in all 4 extremities   Deep tendon reflexes: normal in all 4 extremities  Sensory: light touch intact in all 4 extremities. Cerebellar/coordination: finger nose finger normal without ataxia  Tone: normal in all 4 extremities  Gait: normal.      Labs  Glucose 123  Na 136  K 3.7  Mg 2.10  BUN 13  Cr 0.9    ALT 18  AST 39    WBC 8.4K  Hg 17.0  Platelets 386    UA negative    Studies  CT head w/o 2/10/22, independently reviewed  Densities along the falx appear similar to prior exam.   Other findings as described. Impression  1. Transient episode of altered awareness followed by a period of disorientation. The etiology is unclear. Differential is broad. 2.  Abnormal CT head. 3.  Hypertension. 4.  Stress/grief. Recommendations  1. MRI brain w/wo. 2.  EEG. 3.  Will follow up this afternoon.      Anuja Hudson NP  [address] [place] [address] [id number] Neurology    A copy of this note was provided for Dr Cherelle Bagley MD

## 2022-02-11 NOTE — ED NOTES
Report called to [facility] RN, all questions answered.      Carolyn Wilson RN  02/10/22 [id number]

## 2022-02-11 NOTE — PROGRESS NOTES
Pt discharged home at [phone]. Discharge instructions given and explained. All questions explained. IV is removed. Dressing applied to site. Transportation to home provided by daughter.     Electronically signed by Cynthia Farfan RN on 2/11/2022 at 4:58 PM

## 2022-02-11 NOTE — PROGRESS NOTES
4 Eyes Skin Assessment     NAME:  Jonathan Garcia  YOB: 1945  MEDICAL RECORD NUMBER:  [id number]    The patient is being assess for  Admission    I agree that 2 RN's have performed a thorough Head to Toe Skin Assessment on the patient. ALL assessment sites listed below have been assessed. Areas assessed by both nurses:    Head, Face, Ears, Shoulders, Back, Chest, Arms, Elbows, Hands, Sacrum. Buttock, Coccyx, Ischium and Legs. Feet and Heels        Does the Patient have a Wound?  No noted wound(s)       Dung Prevention initiated:  No   Wound Care Orders initiated:  No    Pressure Injury (Stage 3,4, Unstageable, DTI, NWPT, and Complex wounds) if present place consult order under [de-identified] No    New and Established Ostomies if present place consult order under : No      Nurse 1 eSignature: Electronically signed by Garcia Matthews RN on 2/10/22 at 10:55 PM EST    **SHARE this note so that the co-signing nurse is able to place an eSignature**    Nurse 2 eSignature: Electronically signed by Ester Zhang RN on 2/10/22 at 11:29 PM EST

## 2022-02-11 NOTE — PROGRESS NOTES
Pt arrived via stretcher from ED to room 9442. Heart monitor connected and verified with [facility]. VS, assessment, and admission complete. 4 eyes assessment complete. Pt oriented to unit and room. Call light and bedside table in reach.

## 2022-02-11 NOTE — PROGRESS NOTES
Birdia Osler MD  2/11/2022,   Charleen Phelan MD    1945  Chief Complaint   Patient presents with    Loss of Consciousness        Active Problems:    Hyperlipidemia    Hypertension    Syncope and collapse  Resolved Problems:    * No resolved hospital problems. *     has a past medical history of Depression, Hyperlipidemia, Hypertension, and Special screening for malignant neoplasm of prostate. Past Surgical History:     has a past surgical history that includes Appendectomy (1984). ED REVIEW:        CURRENT STATUS:  HOSPITALIST:    ED. Uncertain whether patient had any seizure-like activity but he denies any notable symptoms of chest pain or shortness of breath or lightheadedness this morning or prior to the event. He is uncertain as to how long he was unconscious for  but he does not remember how he wound up on the ground. He has no history of seizures and no other history of strokes or other neurologic processes. He denies any previous history of heart attacks or irregular heartbeats. He denies any other recent symptoms of fever, chills, dizziness, chest pain, shortness of breath, dysuria, cough, blood in the urine/stool/sputum, nausea/vomiting/diarrhea/abdominal pain/poor appetite, leg swelling. He notes that this is 1 year or little more than after the anniversary of the passing of his wife. He went over in some detail as to how his wife found up in the hospital and how she went up on hospice. He has been seeing counseling for his depression in regards to his grief. He wonders if possibly his symptoms might be secondary due to this.     CT imaging of his head did notice some weird hyperdensity in the paramedian falx that potentially could have been consistent with subdural bleed but patient is completely alert and oriented and without any focal neurologic deficits at this time.   Neurosurgery was already notified by ED provider and noted that repeat CT head was again unchanged so very low suspicion for bleeding and that neurosurgery felt that patient could be managed here in this facility. andrea Carrero is a 68 y.o. male who presents with altered mental status.       I obtained my information via interview w/ the patient, supplemented by chart review.       Around 0800 yesterday the patient says that he went outside in order to go to [facility] and that is essentially the last thing he recalls before being back in his house. He really has no idea how much time had passed before he was back inside. He was feeling confused and disoriented at that point and called his daughter he thinks around [phone] to advise her of that. He did not bite his tongue. He did recognize that his pants were wet though not from urine. He ended up coming to the ED around 0950 in order to be evaluated. The event was unwitnessed.      Initial BP was 185/89. CT head w/ questionable hyperdensities. Neurosurgery is involved. Neurologically he was and remains intact.       He says when he was in grade school he had some sort of event that he though resembled seizure. The details are vague.       He has a remote hx of migraines. He has been dealing w/ immense stress and grief from the passing of his wife about a year ago. IMAGING-Relevant:1. No acute infarct, acute hemorrhage or intracranial mass effect. 2. No evidence of intracranial mass or abnormal intracranial enhancement. 3. Senescent changes including chronic small vessel ischemia and mild   generalized parenchymal volume loss.    4. Partially imaged, bulky ossified posterior longitudinal ligament within   the upper cervical spine resulting in severe canal narrowing.    Indeterminate, couple 4-6 mm punctate high-density foci along the   anterior right paramedian falx, image 43 and 45 of series 2.  Differential   for these would include small extra-axial subdural bleed or nonspecific early   calcifications or tiny hyperdense meningiomas. Megan Thompson are no prior   comparisons available.  Correlate clinically and follow-up CT may be obtained   in 12-24 hours to evaluate for change/stability. 2.  No intraparenchymal bleed.  Mild senescent changes with parenchymal   volume loss and chronic small vessel ischemic changes. Communications: The above critical findings were communicated personally by   Dr. Augusto Silva to  at 11:26 a.m. 02/10/2022. MY REVIEW:  68  Male  Altered mental status -NeurologyCommunications: The above critical findings were communicated personally by   Dr. Augusto Silva to  at 11:26 a.m. 02/10/2022. STROKE WORKUP needed MRI after CT  BMI 33    The Utilization Review Committee members, including its physician members, have reviewed this case and agree that this patient does meet evidence based criteria for inpatient services,  to ADMISSION =\"admit as inpatient\"  Medical care will continue to be provided by Crys Colón MD, and neurology who concurs with this decision and has documented his/her concurrence in the patient's medical record. Александр Curiel MD, [address], [address], [address], [address]    Cell [phone]  Office [phone]  2/11/2022  3:32 PM generally intact/overall slow movement and reduced ROM

## 2022-02-11 NOTE — CARE COORDINATION
CASE MANAGEMENT DISCHARGE SUMMARY: Discharge order noted. No needs identified.      DISCHARGE DATE: 2/11/22    DISCHARGED TO HOME     TRANSPORTATION: Family             TIME: Evening     PREFERRED PHARMACY: [address] [place], [place] [address], [place] [phone]              William MCCORD RN  Case Management  [phone]    Electronically signed by William Harrison RN on 2/11/2022 at 6:04 PM

## 2022-02-11 NOTE — CARE COORDINATION
Case Management Assessment           Initial Evaluation                Date / Time of Evaluation: 2/11/2022 2:21 PM                 Assessment Completed by: Sachin Lopez RN    Patient Name: Emanuel Mathew     YOB: 1945  Diagnosis: Syncope and collapse [R55]  Closed head injury, initial encounter [S09.90XA]     Date / Time: 2/10/2022  9:51 AM    Patient Admission Status: Inpatient    Current PCP: Anselmo Woodruff MD    Chart Reviewed: Yes    Emergency Contacts:  Extended Emergency Contact Information  Primary Emergency Contact: Jerry Santamaria  Mobile Phone: [phone]  Relation: Child    Advance Directives:   Code Status: Full Code    Financial  Payor: Rico Trejoing / Plan: Jerelene Mourning / Product Type: *No Product type* /     Pharmacy    [facility] [address] [phone] Zulema Raza [phone]  [address] [id number]  Phone: [phone] Fax: [phone]      ADLS Independent with all aspects of care and an active   Support Systems: [address] Environment: house alone (wife passed away late last year)    Plans to RETURN to current housing: Yes    Connerfrankbriseida Simmons  Currently ACTIVE with [address]: No    DISCHARGE PLAN:  Disposition: Home- No Services Needed    Transportation PLAN for discharge: family     The Patient and/or patient representative Kasi Griffith and his family were provided with a choice of provider and agrees with the discharge plan Yes    Freedom of choice list was provided with basic dialogue that supports the patient's individualized plan of care/goals and shares the quality data associated with the providers.  Yes    Sachin Lopez RN  Case Management  [phone]

## 2022-02-11 NOTE — PLAN OF CARE
Problem: Falls - Risk of:  Goal: Will remain free from falls  Description: Will remain free from falls  2/11/2022 1658 by Felix Rivera RN  Outcome: Completed  2/11/2022 1631 by Felix Rivera RN  Outcome: Ongoing  Goal: Absence of physical injury  Description: Absence of physical injury  2/11/2022 1658 by Felix Rivera RN  Outcome: Completed  2/11/2022 1631 by Felix Rivera RN  Outcome: Ongoing     Problem: Pain:  Goal: Pain level will decrease  Description: Pain level will decrease  2/11/2022 1658 by Felix Rivera RN  Outcome: Completed  2/11/2022 1631 by Felix Rivera RN  Outcome: Ongoing  Goal: Control of acute pain  Description: Control of acute pain  2/11/2022 1658 by Felix Rivera RN  Outcome: Completed  2/11/2022 1631 by Felix Rivera RN  Outcome: Ongoing  Goal: Control of chronic pain  Description: Control of chronic pain  2/11/2022 1658 by Felix Rivera RN  Outcome: Completed  2/11/2022 1631 by Felix Rivera RN  Outcome: Ongoing

## 2022-02-11 NOTE — PROGRESS NOTES
longitudinal ligament with multilevel   acquired spinal stenosis, most severe at C2-3 and C5-6. MRI BRAIN W WO CONTRAST    (Results Pending)           Assessment/Plan:    Active Hospital Problems    Diagnosis Date Noted    Syncope and collapse [R55] 02/10/2022    Hyperlipidemia [E78.5]     Hypertension [I10]          DVT Prophylaxis: ***  Diet: ADULT DIET; Regular; 4 carb choices (60 gm/meal);  Low Sodium (2 gm)  Code Status: Full Code    PT/OT Eval Status: ***    Dispo - ***    Christiano Gilman MD

## 2022-02-11 NOTE — ACP (ADVANCE CARE PLANNING)
Advance Care Planning     Advance Care Planning Activator (Inpatient)  Conversation Note      Date of ACP Conversation: 2/11/2022     Conversation Conducted with: Patient with Decision Making Capacity    ACP Activator: Ibis Armendariz 45 Decision Maker:     Current Designated Health Care Decision Maker:     Primary Decision Maker: Pete Esparza - Child - [phone]    Care Preferences    Ventilation: \"If you were in your present state of health and suddenly became very ill and were unable to breathe on your own, what would your preference be about the use of a ventilator (breathing machine) if it were available to you? \"      Would the patient desire the use of ventilator (breathing machine)?: yes    \"If your health worsens and it becomes clear that your chance of recovery is unlikely, what would your preference be about the use of a ventilator (breathing machine) if it were available to you? \"     Would the patient desire the use of ventilator (breathing machine)?: Unsure      Resuscitation  \"CPR works best to restart the heart when there is a sudden event, like a heart attack, in someone who is otherwise healthy. Unfortunately, CPR does not typically restart the heart for people who have serious health conditions or who are very sick. \"    \"In the event your heart stopped as a result of an underlying serious health condition, would you want attempts to be made to restart your heart (answer \"yes\" for attempt to resuscitate) or would you prefer a natural death (answer \"no\" for do not attempt to resuscitate)? \" yes       [] Yes   [x] No   Educated Patient / Dylan Cheko regarding differences between Advance Directives and portable DNR orders.     Length of ACP Conversation in minutes:  5    Conversation Outcomes:  [x] ACP discussion completed  [] Existing advance directive reviewed with patient; no changes to patient's previously recorded wishes  [] New Advance Directive completed  [] Portable Do Not Rescitate prepared for Provider review and signature  [] POLST/POST/MOLST/MOST prepared for Provider review and signature      Follow-up plan:    [] Schedule follow-up conversation to continue planning  [] Referred individual to Provider for additional questions/concerns   [] Advised patient/agent/surrogate to review completed ACP document and update if needed with changes in condition, patient preferences or care setting    [x] This note routed to one or more involved healthcare providers    Rylie MCCORD RN  Case Management  [phone]    Electronically signed by Rylei Schmid RN on 2/11/2022 at 6:06 PM

## 2022-02-15 NOTE — DISCHARGE SUMMARY
Hospital Medicine Discharge Summary    Patient ID: Jeff Carrero      Patient's PCP: Dandy Sherman MD    Admit Date: 2/10/2022     Discharge Date: 2/11/2022      Admitting Physician: Jannet Ramey DO     Discharge Physician: Sheryl Garibay MD     Discharge Diagnoses: Active Hospital Problems    Diagnosis Date Noted    Syncope and collapse [R55] 02/10/2022    Hyperlipidemia [E78.5]     Hypertension [I10]        The patient was seen and examined on day of discharge and this discharge summary is in conjunction with any daily progress note from day of discharge. Hospital Course:     AMS:  - Initial and repeat CT scans with concern for potential intracranial bleeding.  - MRI was without acute infarct or hemorrhage  -  Seizure is on the differential, but unable to get EEG over the weekend. - Neurology ok for D/C with outpatient EEG and neurology f/u. Exam:     BP (!) 143/85   Pulse 82   Temp 97.4 °F (36.3 °C) (Oral)   Resp 18   Ht 5' 10\" (1.778 m)   Wt 224 lb 6.9 oz (101.8 kg)   SpO2 93%   BMI 32.20 kg/m²     General appearance: No apparent distress, appears stated age and cooperative. HEENT: Pupils equal, round, and reactive to light. Conjunctivae/corneas clear. Neck: Supple, with full range of motion. No jugular venous distention. Trachea midline. Respiratory:  Normal respiratory effort. Clear to auscultation, bilaterally without Rales/Wheezes/Rhonchi. Cardiovascular: Regular rate and rhythm with normal S1/S2 without murmurs, rubs or gallops. Abdomen: Soft, non-tender, non-distended with normal bowel sounds. Musculoskelatal: No clubbing, cyanosis or edema bilaterally. Full range of motion without deformity. Skin: Skin color, texture, turgor normal.  No rashes or lesions. Neurologic:  Neurovascularly intact without any focal sensory/motor deficits.  Cranial nerves: II-XII intact, grossly non-focal.  Psychiatric: Alert and oriented, thought content appropriate, normal insight      Consults:     IP CONSULT TO NEUROSURGERY  IP CONSULT TO NEUROSURGERY  IP CONSULT TO HOSPITALIST  IP CONSULT TO NEUROLOGY    Significant Diagnostic Studies:         Radiology:  CT HEAD WO CONTRAST   Final Result   Densities along the falx appear similar to prior exam.       Other findings as described.           XR HIP RIGHT (2-3 VIEWS)   Final Result   No acute osseous abnormality of the pelvis or right hip.           XR CHEST 1 VIEW   Final Result   Clear lungs.           CT HEAD WO CONTRAST   Final Result   1. Indeterminate, couple 4-6 mm punctate high-density foci along the   anterior right paramedian falx, image 43 and 45 of series 2. Differential   for these would include small extra-axial subdural bleed or nonspecific early   calcifications or tiny hyperdense meningiomas. There are no prior   comparisons available. Correlate clinically and follow-up CT may be obtained   in 12-24 hours to evaluate for change/stability.       2. No intraparenchymal bleed. Mild senescent changes with parenchymal   volume loss and chronic small vessel ischemic changes.       Communications:  The above critical findings were communicated personally by   Dr. Kellen Mariee to Karissa Baugh at 11:26 a.m. 02/10/2022.       RECOMMENDATIONS:   Follow-up head CT in 12-24 hours may be obtained.           CT CERVICAL SPINE WO CONTRAST   Final Result   No acute abnormality of the cervical spine.       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         MRI BRAIN W WO CONTRAST [[id number]] Collected: 02/11/22 1314      Order Status: Completed Updated: 02/11/22 1325     Narrative:       EXAMINATION:   MRI OF THE BRAIN WITHOUT AND WITH CONTRAST  2/11/2022 9:49 am     TECHNIQUE:   Multiplanar multisequence MRI of the head/brain was performed without and   with the administration of intravenous contrast.     COMPARISON:   Head CT 02/10/2022 HISTORY:   ORDERING SYSTEM PROVIDED HISTORY: syncope, ? mass     FINDINGS:   INTRACRANIAL STRUCTURES/VENTRICLES: No evidence of an acute infarct or other   acute parenchymal process. No evidence of acute intracranial hemorrhage. There   is no evidence of an intracranial mass or extraaxial fluid collection. No   significant mass effect or midline shift. Patchy and early confluent foci of   T2/FLAIR hyperintensity are present within supratentorial white matter which   is a nonspecific finding but likely represents chronic microvascular   ischemia. There is mild generalized volume loss. Ventricular enlargement   concordant with the degree of parenchymal volume loss.  The   sellar/suprasellar regions appear unremarkable.  The normal signal voids   within the major intracranial vessels appear maintained.  No evidence of   abnormal parenchymal or leptomeningeal enhancement. ORBITS: The visualized portion of the orbits demonstrate no acute abnormality. SINUSES: The visualized paranasal sinuses and mastoid air cells are well   aerated. BONES/SOFT TISSUES: The bone marrow signal intensity appears normal. The soft   tissues demonstrate no acute abnormality. Severe canal narrowing within the   partially imaged upper cervical spine related to bulky posterior longitudinal   ligament calcification.      Impression:       1. No acute infarct, acute hemorrhage or intracranial mass effect. 2. No evidence of intracranial mass or abnormal intracranial enhancement. 3. Senescent changes including chronic small vessel ischemia and mild   generalized parenchymal volume loss. 4. Partially imaged, bulky ossified posterior longitudinal ligament within   the upper cervical spine resulting in severe canal narrowing. PCP/SNF to follow up: Outpatient EED and Seizure precautions     Disposition:  Home    Condition on D/C:  Stable    Discharge Instructions/Follow-up:  EEG and f/u with neurology within 2 weeks.     Code Status:  Prior     Activity: activity as tolerated    Diet: cardiac diet    Labs: For convenience and continuity at follow-up the following most recent labs are provided:      CBC:    Lab Results   Component Value Date    WBC 8.4 02/11/2022    HGB 17.0 02/11/2022    HCT 48.2 02/11/2022     02/11/2022       Renal:    Lab Results   Component Value Date     02/11/2022    K 3.7 02/11/2022     02/11/2022    CO2 20 02/11/2022    BUN 13 02/11/2022    CREATININE 0.9 02/11/2022    CALCIUM 9.2 02/11/2022       Discharge Medications:     Discharge Medication List as of 2/11/2022  4:45 PM           Details   metoprolol succinate (TOPROL XL) 200 MG extended release tablet Take 200 mg by mouth dailyHistorical Med      [facility] JOHNS WORT PO Take 1 tablet by mouth 2 times dailyHistorical Med      hydroCHLOROthiazide (HYDRODIURIL) 25 MG tablet TAKE ONE TABLET BY MOUTH DAILY, Disp-90 tablet, R-3Normal      fenofibrate (TRICOR) 145 MG tablet TAKE ONE TABLET BY MOUTH DAILY, Disp-90 tablet, R-3Normal      amLODIPine-benazepril (LOTREL) 10-20 MG per capsule TAKE ONE CAPSULE BY MOUTH DAILY, Disp-90 capsule, R-3Normal      escitalopram (LEXAPRO) 10 MG tablet Take 1 tablet by mouth daily, Disp-30 tablet, R-3Normal             Time Spent on discharge is more than 30 minutes in the examination, evaluation, counseling and review of medications and discharge plan. Signed: Steven Desouza MD   2/15/2022      Thank you Junior Holli MD for the opportunity to be involved in this patient's care. If you have any questions or concerns please feel free to contact me at [phone].

## 2022-03-02 ENCOUNTER — OFFICE VISIT (OUTPATIENT)
Dept: FAMILY MEDICINE CLINIC | Age: 77
End: 2022-03-02
Payer: COMMERCIAL

## 2022-03-02 VITALS
RESPIRATION RATE: 16 BRPM | DIASTOLIC BLOOD PRESSURE: 104 MMHG | SYSTOLIC BLOOD PRESSURE: 156 MMHG | WEIGHT: 230 LBS | OXYGEN SATURATION: 99 % | BODY MASS INDEX: 33 KG/M2 | HEART RATE: 72 BPM

## 2022-03-02 DIAGNOSIS — I10 PRIMARY HYPERTENSION: ICD-10-CM

## 2022-03-02 DIAGNOSIS — R55 SYNCOPE AND COLLAPSE: Primary | ICD-10-CM

## 2022-03-02 DIAGNOSIS — F32.5 MAJOR DEPRESSIVE DISORDER WITH SINGLE EPISODE, IN REMISSION (HCC): ICD-10-CM

## 2022-03-02 PROCEDURE — 99215 OFFICE O/P EST HI 40 MIN: CPT | Performed by: FAMILY MEDICINE

## 2022-03-02 PROCEDURE — 1111F DSCHRG MED/CURRENT MED MERGE: CPT | Performed by: FAMILY MEDICINE

## 2022-03-02 NOTE — PROGRESS NOTES
3/2/2022    Libertad Catalan (:  1945) is a 68 y.o. male, here for evaluation of the following chief complaint(s):  Follow-Up from Hospital ([address] [place] [address],[address] 2/10/22 )      ASSESSMENT/PLAN:     Diagnosis Orders   1. Syncope and collapse  SD DISCHARGE MEDS RECONCILED W/ CURRENT OUTPATIENT MED LIST    still symptmatic / expr aphasia; follow through [facility] Neurology for EEG; consider Neurosurgery eval; also canal narrowing at Upper cervical spine   2. Primary hypertension      not at goal he forgot to take meds this morning asked to restart meds today   3. Major depressive disorder with single episode, in remission [facility])  Ambulatory referral to Psychiatry    asked to se ASHLEY Reddy for further depression help       No follow-ups on file. An electronic signature was used to authenticate this note. @SIG@    SUBJECTIVE/OBJECTIVE:  (NOTE : prior results listed below reviewed at this visit to assist in medical decision making.)    HPI / ROS    # Complicated multiply comorbid at risk patient here for follow up of several issues, including threatening chronic illness as below:  Time - 40+ min for chart review and patient visit and review w daughter        # HFU for syncope and collapse 10- (3 weeks ago)  D/w Dr. Diana Amin Neurosurgery / Dr. Mimi Rodriguez Neurology while at hospital per IM  Told outpt EEG w neurology    Brain IMaging :  Impression   1. No acute infarct, acute hemorrhage or intracranial mass effect. 2. No evidence of intracranial mass or abnormal intracranial enhancement. 3. Senescent changes including chronic small vessel ischemia and mild   generalized parenchymal volume loss. 4. Partially imaged, bulky ossified posterior longitudinal ligament within   the upper cervical spine resulting in severe canal narrowing. CT Head no bleed    # depression - he thinks things are worse w depression; Lexapro and has seen Cleo Silva.         Wt Readings from Last 3 Encounters:   22 230 lb (104.3 kg) 02/11/22 224 lb 6.9 oz (101.8 kg)   09/22/21 226 lb (102.5 kg)       BP Readings from Last 3 Encounters:   03/02/22 (!) 156/104   02/11/22 (!) 143/85   09/22/21 (!) 162/86       PHYSICAL EXAM  Vitals:    03/02/22 1023 03/02/22 1030   BP: (!) 160/110 (!) 156/104   Site: Left Upper Arm Left Upper Arm   Position: Sitting Sitting   Cuff Size: Large Adult Large Adult   Pulse: 72    Resp: 16    SpO2: 99%    Weight: 230 lb (104.3 kg)      A&o  Some expressive aphasia word searching noted.   Car reg no MGR  Lungs cta

## 2022-04-18 PROBLEM — I69.328 SPEECH OR LANGUAGE DEFICIT FOLLOWING CEREBROVASCULAR ACCIDENT: Status: ACTIVE | Noted: 2022-04-18

## 2022-04-18 PROBLEM — R47.01 EXPRESSIVE APHASIA: Status: ACTIVE | Noted: 2022-04-18

## 2022-04-18 NOTE — PROGRESS NOTES
2022    Tan Robertson (:  1945) is a 68 y.o. male, here for evaluation of the following chief complaint(s):  Follow-up and Hypertension      ASSESSMENT/PLAN:     Diagnosis Orders   1. Primary hypertension      at goal on 4 meds this   2. Expressive aphasia      reviewed EEG normal   3. 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 An electronic signature was used to authenticate this note. @SIG@    SUBJECTIVE/OBJECTIVE:  (NOTE : prior results listed below reviewed at this visit to assist in medical decision making.)    HPI / ROS   f/u BP; last visit he was not at goal had forgotten meds    # HTN - shane meds no CP/SOB he has persistently higher BP due to anxiety in office. On 4 high dose meds.   BP Readings from Last 3 Encounters:   22 (!) 158/74   22 (!) 156/104   22 (!) 14385     Lab Results   Component Value Date     2022    K 3.7 2022     2022    CO2 20 2022    BUN 13 2022    CREATININE 0.9 2022    GLUCOSE 106 2022    CALCIUM 9.2 2022         # Expressive aphasia we discus his improvement and he attributes to depression which he reports is improving      Wt Readings from Last 3 Encounters:   22 230 lb (104.3 kg)   22 230 lb (104.3 kg)   22 224 lb 6.9 oz (101.8 kg)       BP Readings from Last 3 Encounters:   22 (!) 158/74   22 (!) 156/104   22 (!) 143/85       PHYSICAL EXAM  Vitals:    22 0850 22 0913   BP: (!) 148/98 (!) 158/74   Pulse: 65    Temp: 97.7 °F (36.5 °C)    TempSrc: Temporal    SpO2: 94%    Weight: 230 lb (104.3 kg)    Height: 5' 10\" (1.778 m)      A&o  Neck no TMG no bruit  Car reg no MGR  Lungs cta  Ext no edema  Skin no jaundice  Eyes anicteric

## 2022-04-19 ENCOUNTER — OFFICE VISIT (OUTPATIENT)
Dept: FAMILY MEDICINE CLINIC | Age: 77
End: 2022-04-19
Payer: COMMERCIAL

## 2022-04-19 VITALS
OXYGEN SATURATION: 94 % | DIASTOLIC BLOOD PRESSURE: 74 MMHG | SYSTOLIC BLOOD PRESSURE: 158 MMHG | BODY MASS INDEX: 32.93 KG/M2 | WEIGHT: 230 LBS | TEMPERATURE: 97.7 F | HEART RATE: 65 BPM | HEIGHT: 70 IN

## 2022-04-19 DIAGNOSIS — I10 PRIMARY HYPERTENSION: Primary | ICD-10-CM

## 2022-04-19 DIAGNOSIS — R47.01 EXPRESSIVE APHASIA: ICD-10-CM

## 2022-04-19 DIAGNOSIS — I69.328 SPEECH OR LANGUAGE DEFICIT FOLLOWING CEREBROVASCULAR ACCIDENT: ICD-10-CM

## 2022-04-19 PROCEDURE — 99214 OFFICE O/P EST MOD 30 MIN: CPT | Performed by: FAMILY MEDICINE

## 2022-04-28 ENCOUNTER — TELEPHONE (OUTPATIENT)
Dept: FAMILY MEDICINE CLINIC | Age: 77
End: 2022-04-28

## 2022-07-22 ENCOUNTER — TELEMEDICINE (OUTPATIENT)
Dept: FAMILY MEDICINE CLINIC | Age: 77
End: 2022-07-22
Payer: COMMERCIAL

## 2022-07-22 DIAGNOSIS — Z00.00 INITIAL MEDICARE ANNUAL WELLNESS VISIT: Primary | ICD-10-CM

## 2022-07-22 PROCEDURE — G0438 PPPS, INITIAL VISIT: HCPCS | Performed by: FAMILY MEDICINE

## 2022-07-22 PROCEDURE — 1123F ACP DISCUSS/DSCN MKR DOCD: CPT | Performed by: FAMILY MEDICINE

## 2022-07-22 ASSESSMENT — PATIENT HEALTH QUESTIONNAIRE - PHQ9
9. THOUGHTS THAT YOU WOULD BE BETTER OFF DEAD, OR OF HURTING YOURSELF: 0
SUM OF ALL RESPONSES TO PHQ QUESTIONS 1-9: 3
1. LITTLE INTEREST OR PLEASURE IN DOING THINGS: 1
SUM OF ALL RESPONSES TO PHQ QUESTIONS 1-9: 3
SUM OF ALL RESPONSES TO PHQ9 QUESTIONS 1 & 2: 2
SUM OF ALL RESPONSES TO PHQ QUESTIONS 1-9: 3
6. FEELING BAD ABOUT YOURSELF - OR THAT YOU ARE A FAILURE OR HAVE LET YOURSELF OR YOUR FAMILY DOWN: 0
7. TROUBLE CONCENTRATING ON THINGS, SUCH AS READING THE NEWSPAPER OR WATCHING TELEVISION: 0
5. POOR APPETITE OR OVEREATING: 0
2. FEELING DOWN, DEPRESSED OR HOPELESS: 1
10. IF YOU CHECKED OFF ANY PROBLEMS, HOW DIFFICULT HAVE THESE PROBLEMS MADE IT FOR YOU TO DO YOUR WORK, TAKE CARE OF THINGS AT HOME, OR GET ALONG WITH OTHER PEOPLE: 0
8. MOVING OR SPEAKING SO SLOWLY THAT OTHER PEOPLE COULD HAVE NOTICED. OR THE OPPOSITE, BEING SO FIGETY OR RESTLESS THAT YOU HAVE BEEN MOVING AROUND A LOT MORE THAN USUAL: 0
3. TROUBLE FALLING OR STAYING ASLEEP: 1
SUM OF ALL RESPONSES TO PHQ QUESTIONS 1-9: 3
4. FEELING TIRED OR HAVING LITTLE ENERGY: 0

## 2022-07-22 ASSESSMENT — LIFESTYLE VARIABLES
HOW OFTEN DURING THE LAST YEAR HAVE YOU FAILED TO DO WHAT WAS NORMALLY EXPECTED FROM YOU BECAUSE OF DRINKING: 0
HAS A RELATIVE, FRIEND, DOCTOR, OR ANOTHER HEALTH PROFESSIONAL EXPRESSED CONCERN ABOUT YOUR DRINKING OR SUGGESTED YOU CUT DOWN: 0
HOW OFTEN DURING THE LAST YEAR HAVE YOU BEEN UNABLE TO REMEMBER WHAT HAPPENED THE NIGHT BEFORE BECAUSE YOU HAD BEEN DRINKING: 0
HOW OFTEN DURING THE LAST YEAR HAVE YOU FOUND THAT YOU WERE NOT ABLE TO STOP DRINKING ONCE YOU HAD STARTED: 0
HAVE YOU OR SOMEONE ELSE BEEN INJURED AS A RESULT OF YOUR DRINKING: 0
HOW OFTEN DURING THE LAST YEAR HAVE YOU HAD A FEELING OF GUILT OR REMORSE AFTER DRINKING: 0
HOW OFTEN DURING THE LAST YEAR HAVE YOU NEEDED AN ALCOHOLIC DRINK FIRST THING IN THE MORNING TO GET YOURSELF GOING AFTER A NIGHT OF HEAVY DRINKING: 0
HOW MANY STANDARD DRINKS CONTAINING ALCOHOL DO YOU HAVE ON A TYPICAL DAY: 1 OR 2
HOW OFTEN DO YOU HAVE A DRINK CONTAINING ALCOHOL: 4 OR MORE TIMES A WEEK

## 2022-07-27 RX ORDER — METOPROLOL SUCCINATE 200 MG/1
TABLET, EXTENDED RELEASE ORAL
Qty: 90 TABLET | Refills: 3 | Status: SHIPPED | OUTPATIENT
Start: 2022-07-27

## 2022-08-11 NOTE — TELEPHONE ENCOUNTER
Medication:   Requested Prescriptions     Pending Prescriptions Disp Refills    fenofibrate (TRICOR) 145 MG tablet [Pharmacy Med Name: FENOFIBRATE 145 MG TABLET] 90 tablet 3     Sig: TAKE ONE TABLET BY MOUTH DAILY        Last Filled:  7/7/2021    Patient Phone Number: [phone] (home)     Last appt: 7/22/2022   Next appt: 8/19/2022    Last OARRS: No flowsheet data found.

## 2022-08-15 RX ORDER — FENOFIBRATE 145 MG/1
TABLET, COATED ORAL
Qty: 90 TABLET | Refills: 3 | Status: SHIPPED | OUTPATIENT
Start: 2022-08-15

## 2022-08-19 ENCOUNTER — OFFICE VISIT (OUTPATIENT)
Dept: FAMILY MEDICINE CLINIC | Age: 77
End: 2022-08-19
Payer: COMMERCIAL

## 2022-08-19 VITALS
HEART RATE: 62 BPM | SYSTOLIC BLOOD PRESSURE: 158 MMHG | OXYGEN SATURATION: 98 % | WEIGHT: 228 LBS | BODY MASS INDEX: 32.71 KG/M2 | DIASTOLIC BLOOD PRESSURE: 108 MMHG | RESPIRATION RATE: 16 BRPM

## 2022-08-19 DIAGNOSIS — G47.33 OBSTRUCTIVE SLEEP APNEA SYNDROME: ICD-10-CM

## 2022-08-19 DIAGNOSIS — F32.4 MAJOR DEPRESSIVE DISORDER WITH SINGLE EPISODE, IN PARTIAL REMISSION (HCC): ICD-10-CM

## 2022-08-19 DIAGNOSIS — I10 ESSENTIAL HYPERTENSION: ICD-10-CM

## 2022-08-19 DIAGNOSIS — E88.81 METABOLIC SYNDROME: ICD-10-CM

## 2022-08-19 DIAGNOSIS — E78.2 MIXED HYPERLIPIDEMIA: ICD-10-CM

## 2022-08-19 DIAGNOSIS — R73.03 PREDIABETES: ICD-10-CM

## 2022-08-19 DIAGNOSIS — I10 PRIMARY HYPERTENSION: Primary | ICD-10-CM

## 2022-08-19 LAB
A/G RATIO: 1.4 (ref 1.1–2.2)
ALBUMIN SERPL-MCNC: 4.4 G/DL (ref 3.4–5)
ALP BLD-CCNC: 102 U/L (ref 40–129)
ALT SERPL-CCNC: 18 U/L (ref 10–40)
ANION GAP SERPL CALCULATED.3IONS-SCNC: 14 MMOL/L (ref 3–16)
AST SERPL-CCNC: 21 U/L (ref 15–37)
BILIRUB SERPL-MCNC: 0.3 MG/DL (ref 0–1)
BUN BLDV-MCNC: 12 MG/DL (ref 7–20)
CALCIUM SERPL-MCNC: 9.8 MG/DL (ref 8.3–10.6)
CHLORIDE BLD-SCNC: 101 MMOL/L (ref 99–110)
CHOLESTEROL, TOTAL: 253 MG/DL (ref 0–199)
CO2: 23 MMOL/L (ref 21–32)
CREAT SERPL-MCNC: 0.9 MG/DL (ref 0.8–1.3)
ESTIMATED AVERAGE GLUCOSE: 134.1 MG/DL
GFR AFRICAN AMERICAN: >60
GFR NON-AFRICAN AMERICAN: >60
GLUCOSE BLD-MCNC: 125 MG/DL (ref 70–99)
HBA1C MFR BLD: 6.3 %
HDLC SERPL-MCNC: 32 MG/DL (ref 40–60)
LDL CHOLESTEROL CALCULATED: ABNORMAL MG/DL
LDL CHOLESTEROL DIRECT: 134 MG/DL
POTASSIUM SERPL-SCNC: 4.5 MMOL/L (ref 3.5–5.1)
SODIUM BLD-SCNC: 138 MMOL/L (ref 136–145)
TOTAL PROTEIN: 7.5 G/DL (ref 6.4–8.2)
TRIGL SERPL-MCNC: 355 MG/DL (ref 0–150)
VLDLC SERPL CALC-MCNC: ABNORMAL MG/DL

## 2022-08-19 PROCEDURE — 1123F ACP DISCUSS/DSCN MKR DOCD: CPT | Performed by: FAMILY MEDICINE

## 2022-08-19 PROCEDURE — 36415 COLL VENOUS BLD VENIPUNCTURE: CPT | Performed by: FAMILY MEDICINE

## 2022-08-19 PROCEDURE — 99214 OFFICE O/P EST MOD 30 MIN: CPT | Performed by: FAMILY MEDICINE

## 2022-08-19 RX ORDER — AMLODIPINE BESYLATE AND BENAZEPRIL HYDROCHLORIDE 10; 20 MG/1; MG/1
CAPSULE ORAL
Qty: 90 CAPSULE | Refills: 3 | Status: SHIPPED | OUTPATIENT
Start: 2022-08-19

## 2022-08-19 RX ORDER — HYDROCHLOROTHIAZIDE 25 MG/1
25 TABLET ORAL DAILY
Qty: 90 TABLET | Refills: 3 | Status: SHIPPED | OUTPATIENT
Start: 2022-08-19 | End: 2022-11-17

## 2022-08-19 RX ORDER — PAROXETINE 10 MG/1
10 TABLET, FILM COATED ORAL DAILY
Qty: 30 TABLET | Refills: 3 | Status: SHIPPED | OUTPATIENT
Start: 2022-08-19 | End: 2022-09-13 | Stop reason: SDUPTHER

## 2022-08-19 ASSESSMENT — PATIENT HEALTH QUESTIONNAIRE - PHQ9
7. TROUBLE CONCENTRATING ON THINGS, SUCH AS READING THE NEWSPAPER OR WATCHING TELEVISION: 0
SUM OF ALL RESPONSES TO PHQ QUESTIONS 1-9: 9
4. FEELING TIRED OR HAVING LITTLE ENERGY: 3
5. POOR APPETITE OR OVEREATING: 0
8. MOVING OR SPEAKING SO SLOWLY THAT OTHER PEOPLE COULD HAVE NOTICED. OR THE OPPOSITE, BEING SO FIGETY OR RESTLESS THAT YOU HAVE BEEN MOVING AROUND A LOT MORE THAN USUAL: 0
SUM OF ALL RESPONSES TO PHQ QUESTIONS 1-9: 9
3. TROUBLE FALLING OR STAYING ASLEEP: 3
2. FEELING DOWN, DEPRESSED OR HOPELESS: 3
SUM OF ALL RESPONSES TO PHQ QUESTIONS 1-9: 9
6. FEELING BAD ABOUT YOURSELF - OR THAT YOU ARE A FAILURE OR HAVE LET YOURSELF OR YOUR FAMILY DOWN: 0
9. THOUGHTS THAT YOU WOULD BE BETTER OFF DEAD, OR OF HURTING YOURSELF: 0
1. LITTLE INTEREST OR PLEASURE IN DOING THINGS: 0
SUM OF ALL RESPONSES TO PHQ QUESTIONS 1-9: 9
10. IF YOU CHECKED OFF ANY PROBLEMS, HOW DIFFICULT HAVE THESE PROBLEMS MADE IT FOR YOU TO DO YOUR WORK, TAKE CARE OF THINGS AT HOME, OR GET ALONG WITH OTHER PEOPLE: 1
SUM OF ALL RESPONSES TO PHQ9 QUESTIONS 1 & 2: 3

## 2022-08-19 NOTE — PROGRESS NOTES
2022    Juaquin Smith (:  1945) is a 68 y.o. male, here for evaluation of the following chief complaint(s):  Hypertension      ASSESSMENT/PLAN:     Diagnosis Orders   1. Primary hypertension  Comprehensive Metabolic Panel    not taking meds; \"lazy; don't care \" he says. check renal; asked to restart meds and RX provided      2. Mixed hyperlipidemia  Comprehensive Metabolic Panel    Lipid Panel    LFts lipids on statin cont      3. Metabolic syndrome  Comprehensive Metabolic Panel    Hemoglobin A1C    a1c today; he was borderline 2021 A1c was 6.5 then      4. Major depressive disorder with single episode, in partial remission ([facility] [address]      we discuss his lack of self care affecting med taking; we agree trial paxil instead (not taking lexapro)      5. Prediabetes   Hemoglobin A1C      6. Essential hypertension  amLODIPine-benazepril (LOTREL) 10-20 MG per capsule    at goal cont med check renal      7. Obstructive sleep apnea syndrome      he voices will return to rechck and adjust          Return in about 3 weeks (around 2022) for HTN, Depression. An electronic signature was used to authenticate this note.     SUBJECTIVE/OBJECTIVE:  (NOTE : prior results listed below reviewed at this visit to assist in medical decision making.)    HPI / ROS    # HTN - shane meds no CP/SOB  BP Readings from Last 3 Encounters:   22 (!) 158/108   22 (!) 158/74   22 (!) 156/104     Lab Results   Component Value Date/Time     2022 04:20 AM    K 3.7 2022 04:20 AM     2022 04:20 AM    CO2 20 2022 04:20 AM    BUN 13 2022 04:20 AM    CREATININE 0.9 2022 04:20 AM    GLUCOSE 106 2022 04:20 AM    CALCIUM 9.2 2022 04:20 AM       # Hyperlipidemia on statin shane this no myalgias or weakness LIPIDS DUE on fibrate  Lab Results   Component Value Date    LDLCALC see below 2021    LDLDIRECT 123 (H) 2021      Lab Results   Component Value Date

## 2022-08-22 ENCOUNTER — TELEPHONE (OUTPATIENT)
Dept: FAMILY MEDICINE CLINIC | Age: 77
End: 2022-08-22

## 2022-08-22 NOTE — TELEPHONE ENCOUNTER
[facility]   8/22/2022  9:16 AM EDT Back to Top      Left Message for patient [phone] (home)    to call the office regarding below message. Verna Parker MD   8/21/2022  4:04 PM EDT       Labs OK just needs lower carb diet and less alcohol to lower TGs  See me for planned O in 4 weeks as we discussed   Pt called back and I advised of notes.

## 2022-09-13 ENCOUNTER — OFFICE VISIT (OUTPATIENT)
Dept: FAMILY MEDICINE CLINIC | Age: 77
End: 2022-09-13
Payer: COMMERCIAL

## 2022-09-13 VITALS
OXYGEN SATURATION: 98 % | RESPIRATION RATE: 16 BRPM | SYSTOLIC BLOOD PRESSURE: 138 MMHG | HEART RATE: 64 BPM | BODY MASS INDEX: 33.15 KG/M2 | DIASTOLIC BLOOD PRESSURE: 84 MMHG | WEIGHT: 231 LBS

## 2022-09-13 DIAGNOSIS — G47.33 OBSTRUCTIVE SLEEP APNEA SYNDROME: ICD-10-CM

## 2022-09-13 DIAGNOSIS — I10 PRIMARY HYPERTENSION: ICD-10-CM

## 2022-09-13 DIAGNOSIS — Z23 NEEDS FLU SHOT: ICD-10-CM

## 2022-09-13 DIAGNOSIS — F32.4 MAJOR DEPRESSIVE DISORDER WITH SINGLE EPISODE, IN PARTIAL REMISSION (HCC): Primary | ICD-10-CM

## 2022-09-13 PROCEDURE — 1123F ACP DISCUSS/DSCN MKR DOCD: CPT | Performed by: FAMILY MEDICINE

## 2022-09-13 PROCEDURE — G0008 ADMIN INFLUENZA VIRUS VAC: HCPCS | Performed by: FAMILY MEDICINE

## 2022-09-13 PROCEDURE — 90694 VACC AIIV4 NO PRSRV 0.5ML IM: CPT | Performed by: FAMILY MEDICINE

## 2022-09-13 PROCEDURE — 99214 OFFICE O/P EST MOD 30 MIN: CPT | Performed by: FAMILY MEDICINE

## 2022-09-13 RX ORDER — PAROXETINE HYDROCHLORIDE 20 MG/1
20 TABLET, FILM COATED ORAL DAILY
Qty: 30 TABLET | Refills: 3 | Status: SHIPPED | OUTPATIENT
Start: 2022-09-13 | End: 2022-10-19

## 2022-09-13 ASSESSMENT — PATIENT HEALTH QUESTIONNAIRE - PHQ9
SUM OF ALL RESPONSES TO PHQ QUESTIONS 1-9: 0
4. FEELING TIRED OR HAVING LITTLE ENERGY: 2
1. LITTLE INTEREST OR PLEASURE IN DOING THINGS: 0
3. TROUBLE FALLING OR STAYING ASLEEP: 2
SUM OF ALL RESPONSES TO PHQ QUESTIONS 1-9: 7
SUM OF ALL RESPONSES TO PHQ9 QUESTIONS 1 & 2: 0
10. IF YOU CHECKED OFF ANY PROBLEMS, HOW DIFFICULT HAVE THESE PROBLEMS MADE IT FOR YOU TO DO YOUR WORK, TAKE CARE OF THINGS AT HOME, OR GET ALONG WITH OTHER PEOPLE: 1
SUM OF ALL RESPONSES TO PHQ QUESTIONS 1-9: 0
8. MOVING OR SPEAKING SO SLOWLY THAT OTHER PEOPLE COULD HAVE NOTICED. OR THE OPPOSITE, BEING SO FIGETY OR RESTLESS THAT YOU HAVE BEEN MOVING AROUND A LOT MORE THAN USUAL: 2
7. TROUBLE CONCENTRATING ON THINGS, SUCH AS READING THE NEWSPAPER OR WATCHING TELEVISION: 0
SUM OF ALL RESPONSES TO PHQ QUESTIONS 1-9: 0
9. THOUGHTS THAT YOU WOULD BE BETTER OFF DEAD, OR OF HURTING YOURSELF: 1
SUM OF ALL RESPONSES TO PHQ9 QUESTIONS 1 & 2: 0
SUM OF ALL RESPONSES TO PHQ QUESTIONS 1-9: 0
2. FEELING DOWN, DEPRESSED OR HOPELESS: 0
6. FEELING BAD ABOUT YOURSELF - OR THAT YOU ARE A FAILURE OR HAVE LET YOURSELF OR YOUR FAMILY DOWN: 0
SUM OF ALL RESPONSES TO PHQ QUESTIONS 1-9: 7
2. FEELING DOWN, DEPRESSED OR HOPELESS: 0
5. POOR APPETITE OR OVEREATING: 0
SUM OF ALL RESPONSES TO PHQ QUESTIONS 1-9: 7
SUM OF ALL RESPONSES TO PHQ QUESTIONS 1-9: 6
1. LITTLE INTEREST OR PLEASURE IN DOING THINGS: 0

## 2022-09-13 ASSESSMENT — COLUMBIA-SUICIDE SEVERITY RATING SCALE - C-SSRS
2. HAVE YOU ACTUALLY HAD ANY THOUGHTS OF KILLING YOURSELF?: NO
6. HAVE YOU EVER DONE ANYTHING, STARTED TO DO ANYTHING, OR PREPARED TO DO ANYTHING TO END YOUR LIFE?: NO
1. WITHIN THE PAST MONTH, HAVE YOU WISHED YOU WERE DEAD OR WISHED YOU COULD GO TO SLEEP AND NOT WAKE UP?: NO

## 2022-10-19 ENCOUNTER — TELEPHONE (OUTPATIENT)
Dept: FAMILY MEDICINE CLINIC | Age: 77
End: 2022-10-19

## 2022-10-19 ENCOUNTER — OFFICE VISIT (OUTPATIENT)
Dept: FAMILY MEDICINE CLINIC | Age: 77
End: 2022-10-19
Payer: COMMERCIAL

## 2022-10-19 VITALS
SYSTOLIC BLOOD PRESSURE: 168 MMHG | RESPIRATION RATE: 16 BRPM | OXYGEN SATURATION: 97 % | WEIGHT: 233 LBS | DIASTOLIC BLOOD PRESSURE: 94 MMHG | HEART RATE: 77 BPM | BODY MASS INDEX: 33.43 KG/M2

## 2022-10-19 DIAGNOSIS — F32.4 MAJOR DEPRESSIVE DISORDER WITH SINGLE EPISODE, IN PARTIAL REMISSION (HCC): Primary | ICD-10-CM

## 2022-10-19 DIAGNOSIS — I10 PRIMARY HYPERTENSION: ICD-10-CM

## 2022-10-19 PROCEDURE — 99213 OFFICE O/P EST LOW 20 MIN: CPT | Performed by: FAMILY MEDICINE

## 2022-10-19 PROCEDURE — 1123F ACP DISCUSS/DSCN MKR DOCD: CPT | Performed by: FAMILY MEDICINE

## 2022-10-19 ASSESSMENT — PATIENT HEALTH QUESTIONNAIRE - PHQ9
6. FEELING BAD ABOUT YOURSELF - OR THAT YOU ARE A FAILURE OR HAVE LET YOURSELF OR YOUR FAMILY DOWN: 2
SUM OF ALL RESPONSES TO PHQ9 QUESTIONS 1 & 2: 5
SUM OF ALL RESPONSES TO PHQ QUESTIONS 1-9: 17
SUM OF ALL RESPONSES TO PHQ QUESTIONS 1-9: 17
7. TROUBLE CONCENTRATING ON THINGS, SUCH AS READING THE NEWSPAPER OR WATCHING TELEVISION: 2
3. TROUBLE FALLING OR STAYING ASLEEP: 3
1. LITTLE INTEREST OR PLEASURE IN DOING THINGS: 2
9. THOUGHTS THAT YOU WOULD BE BETTER OFF DEAD, OR OF HURTING YOURSELF: 0
10. IF YOU CHECKED OFF ANY PROBLEMS, HOW DIFFICULT HAVE THESE PROBLEMS MADE IT FOR YOU TO DO YOUR WORK, TAKE CARE OF THINGS AT HOME, OR GET ALONG WITH OTHER PEOPLE: 1
4. FEELING TIRED OR HAVING LITTLE ENERGY: 3
SUM OF ALL RESPONSES TO PHQ QUESTIONS 1-9: 17
5. POOR APPETITE OR OVEREATING: 0
SUM OF ALL RESPONSES TO PHQ QUESTIONS 1-9: 17
2. FEELING DOWN, DEPRESSED OR HOPELESS: 3
8. MOVING OR SPEAKING SO SLOWLY THAT OTHER PEOPLE COULD HAVE NOTICED. OR THE OPPOSITE, BEING SO FIGETY OR RESTLESS THAT YOU HAVE BEEN MOVING AROUND A LOT MORE THAN USUAL: 2

## 2022-10-19 NOTE — TELEPHONE ENCOUNTER
----- Message from April Kiarra sent at 10/19/2022  1:49 PM EDT -----  Subject: Message to Provider    QUESTIONS  Information for Provider? Patient states Dr. Avis Martinez was supposed to call   him later on today 10/19 and he wanted to give a different number to reach   him at [phone]. thank you  ---------------------------------------------------------------------------  --------------  Destinee Justin INFO  [phone]; OK to leave message on voicemail  ---------------------------------------------------------------------------  --------------  SCRIPT ANSWERS  Relationship to Patient?  Self

## 2022-10-19 NOTE — PROGRESS NOTES
10/19/2022    Jose Brannon (:  1945) is a 68 y.o. male, here for evaluation of the following chief complaint(s):  Depression (Increase in med not much of a change)      ASSESSMENT/PLAN:     Diagnosis Orders   1. Major depressive disorder with single episode, in partial remission [facility])      Not improved cont paxil consider [facility] assessment      2. Primary hypertension      intermittent compliance with self care/meds due to refractory depression          Return in about 4 months (around 2023) for HTN, Depression. An electronic signature was used to authenticate this note. SUBJECTIVE/OBJECTIVE:  (NOTE : prior results listed below reviewed at this visit to assist in medical decision making.)    HPI / ROS    # Depression - denies SI. OK on current med(s) per patient. Paxil was increased to 20 mg 4 week ago ago remains very depressed and tearful    20m+ spent obtaiaing 3rd party consultation [facility]    # HTN - shane meds no CP/SOB  BP Readings from Last 3 Encounters:   10/19/22 (!) 168/94   22 138/84   22 (!) 158/108     Lab Results   Component Value Date/Time     2022 08:13 AM    K 4.5 2022 08:13 AM     2022 08:13 AM    CO2 23 2022 08:13 AM    BUN 12 2022 08:13 AM    CREATININE 0.9 2022 08:13 AM    GLUCOSE 125 2022 08:13 AM    CALCIUM 9.8 2022 08:13 AM       Stressed med compliance self care has been a big issue for [employer].                 Wt Readings from Last 3 Encounters:   10/19/22 233 lb (105.7 kg)   22 231 lb (104.8 kg)   22 228 lb (103.4 kg)       BP Readings from Last 3 Encounters:   10/19/22 (!) 168/94   22 138/84   22 (!) 158/108       PHYSICAL EXAM  Vitals:    10/19/22 1013 10/19/22 1018   BP: (!) 180/96 (!) 168/94   Site:  Right Upper Arm   Position:  Sitting   Cuff Size:  Medium Adult   Pulse: 77    Resp: 16    SpO2: 97%    Weight: 233 lb (105.7 kg)      A&o  Car reg

## 2022-10-19 NOTE — TELEPHONE ENCOUNTER
JONATHAN spoke with Kenyon philip with [facility] main location in [facility] tomorrow at 36

## 2022-10-19 NOTE — TELEPHONE ENCOUNTER
Called Mary beckett was able to get appt tomorrow at [facility] [address], [place], . [address]. Appt is at 1130 please bring ID and insurance.  Lanie Olivera phone [phone] per Hali Quinn request today left VM to call office back and called Hali Gave phone as well

## 2022-10-19 NOTE — TELEPHONE ENCOUNTER
Attempted to call [facility] but  is not set up.  Tried other numbers as well has appt for tomorrow with [facility]

## 2022-10-19 NOTE — TELEPHONE ENCOUNTER
----- Message from April Kiarra sent at 10/19/2022  1:49 PM EDT -----  Subject: Message to Provider    QUESTIONS  Information for Provider? Patient states Dr. Brett Lara was supposed to call   him later on today 10/19 and he wanted to give a different number to reach   him at [phone]. thank you  ---------------------------------------------------------------------------  --------------  Jhon Mora INFO  [phone]; OK to leave message on voicemail  ---------------------------------------------------------------------------  --------------  SCRIPT ANSWERS  Relationship to Patient?  Self

## 2022-12-20 RX ORDER — HYDROCHLOROTHIAZIDE 25 MG/1
25 TABLET ORAL DAILY
Qty: 90 TABLET | Refills: 3 | Status: SHIPPED | OUTPATIENT
Start: 2022-12-20 | End: 2023-03-20

## 2022-12-20 NOTE — TELEPHONE ENCOUNTER
Requesting refill on hydroCHLOROthiazide (HYDRODIURIL) 25 MG tablet. States he lost his bottle of this med. Please call into [facility] [phone].  Pt is reachable at  [phone]

## 2022-12-20 NOTE — TELEPHONE ENCOUNTER
Medication:   Requested Prescriptions     Pending Prescriptions Disp Refills    hydroCHLOROthiazide (HYDRODIURIL) 25 MG tablet 90 tablet 3     Sig: Take 1 tablet by mouth daily       Last Filled:      Patient Phone Number: [phone] (home)     Last appt: 10/19/2022   Next appt: Visit date not found

## 2023-09-25 NOTE — PROGRESS NOTES
2023    Pj Justice (:  1945) is a 68 y.o. male, here for evaluation of the following chief complaint(s):  Depression and Hypertension      ASSESSMENT/PLAN:     Diagnosis Orders   1. Primary hypertension  Comprehensive Metabolic Panel    at goal check renal cont meds      2. Mixed hyperlipidemia  Comprehensive Metabolic Panel    LFTs on fibrate      3. Major depressive disorder with single episode, in partial remission (HCC)      OK Paxil      4. Metabolic syndrome  Hemoglobin A1C    a1c       5. Screening PSA (prostate specific antigen)  PSA Screening      6. Prediabetes  Hemoglobin A1C      7. Needs flu shot  Influenza, FLUAD, (age 72 y+), IM, PF, 0.5 mL          Return in about 6 months (around 3/26/2024) for HTN, Hyperlipidemia, Metabolic syndrome. An electronic signature was used to authenticate this note. SUBJECTIVE/OBJECTIVE:  (NOTE : prior results listed below reviewed at this visit to assist in medical decision making.)    HPI / ROS    # HTN - shane meds no CP/SOB  BP Readings from Last 3 Encounters:   23 138/86   04/10/23 (!) 152/92   10/19/22 (!) 168/94     Lab Results   Component Value Date/Time     2023 11:38 AM    K 4.6 2023 11:38 AM    CL 98 2023 11:38 AM    CO2 24 2023 11:38 AM    BUN 13 2023 11:38 AM    CREATININE 1.0 2023 11:38 AM    GLUCOSE 118 2023 11:38 AM    CALCIUM 9.7 2023 11:38 AM       # Depression - denies SI. OK on current med(s) per patient.     # Hyperlipidemia on Tricor shane this no myalgias or weakness  Lab Results   Component Value Date    LDLCALC 136 (H) 04/10/2023    LDLDIRECT 134 (H) 2022      Lab Results   Component Value Date    ALT 16 2023    AST 24 2023    ALKPHOS 83 2023    BILITOT 0.8         # Metabolic Syndrome - check A1C today and reviewed need for lower carb dieting  Lab Results   Component Value Date    LABA1C 6.7 2023     Lab Results   Component

## 2023-09-26 ENCOUNTER — OFFICE VISIT (OUTPATIENT)
Dept: FAMILY MEDICINE CLINIC | Age: 78
End: 2023-09-26

## 2023-09-26 VITALS
HEIGHT: 70 IN | BODY MASS INDEX: 32.07 KG/M2 | WEIGHT: 224 LBS | OXYGEN SATURATION: 98 % | DIASTOLIC BLOOD PRESSURE: 86 MMHG | HEART RATE: 76 BPM | RESPIRATION RATE: 16 BRPM | SYSTOLIC BLOOD PRESSURE: 138 MMHG

## 2023-09-26 DIAGNOSIS — E78.2 MIXED HYPERLIPIDEMIA: ICD-10-CM

## 2023-09-26 DIAGNOSIS — Z12.5 SCREENING PSA (PROSTATE SPECIFIC ANTIGEN): ICD-10-CM

## 2023-09-26 DIAGNOSIS — I10 PRIMARY HYPERTENSION: Primary | ICD-10-CM

## 2023-09-26 DIAGNOSIS — R73.03 PREDIABETES: ICD-10-CM

## 2023-09-26 DIAGNOSIS — Z23 NEEDS FLU SHOT: ICD-10-CM

## 2023-09-26 DIAGNOSIS — E88.810 METABOLIC SYNDROME: ICD-10-CM

## 2023-09-26 DIAGNOSIS — F32.4 MAJOR DEPRESSIVE DISORDER WITH SINGLE EPISODE, IN PARTIAL REMISSION (HCC): ICD-10-CM

## 2023-09-26 ASSESSMENT — PATIENT HEALTH QUESTIONNAIRE - PHQ9
1. LITTLE INTEREST OR PLEASURE IN DOING THINGS: 1
SUM OF ALL RESPONSES TO PHQ QUESTIONS 1-9: 2
SUM OF ALL RESPONSES TO PHQ9 QUESTIONS 1 & 2: 2
5. POOR APPETITE OR OVEREATING: 0
2. FEELING DOWN, DEPRESSED OR HOPELESS: 1
4. FEELING TIRED OR HAVING LITTLE ENERGY: 0
SUM OF ALL RESPONSES TO PHQ QUESTIONS 1-9: 2
3. TROUBLE FALLING OR STAYING ASLEEP: 0
SUM OF ALL RESPONSES TO PHQ QUESTIONS 1-9: 2
10. IF YOU CHECKED OFF ANY PROBLEMS, HOW DIFFICULT HAVE THESE PROBLEMS MADE IT FOR YOU TO DO YOUR WORK, TAKE CARE OF THINGS AT HOME, OR GET ALONG WITH OTHER PEOPLE: 0
2. FEELING DOWN, DEPRESSED OR HOPELESS: 1
1. LITTLE INTEREST OR PLEASURE IN DOING THINGS: 1
SUM OF ALL RESPONSES TO PHQ QUESTIONS 1-9: 2
SUM OF ALL RESPONSES TO PHQ QUESTIONS 1-9: 2
8. MOVING OR SPEAKING SO SLOWLY THAT OTHER PEOPLE COULD HAVE NOTICED. OR THE OPPOSITE, BEING SO FIGETY OR RESTLESS THAT YOU HAVE BEEN MOVING AROUND A LOT MORE THAN USUAL: 0
SUM OF ALL RESPONSES TO PHQ9 QUESTIONS 1 & 2: 2
SUM OF ALL RESPONSES TO PHQ QUESTIONS 1-9: 2
7. TROUBLE CONCENTRATING ON THINGS, SUCH AS READING THE NEWSPAPER OR WATCHING TELEVISION: 0
SUM OF ALL RESPONSES TO PHQ QUESTIONS 1-9: 2
9. THOUGHTS THAT YOU WOULD BE BETTER OFF DEAD, OR OF HURTING YOURSELF: 0
SUM OF ALL RESPONSES TO PHQ QUESTIONS 1-9: 2
6. FEELING BAD ABOUT YOURSELF - OR THAT YOU ARE A FAILURE OR HAVE LET YOURSELF OR YOUR FAMILY DOWN: 0

## 2023-09-27 LAB
ALBUMIN SERPL-MCNC: 4.7 G/DL (ref 3.4–5)
ALBUMIN/GLOB SERPL: 1.6 {RATIO} (ref 1.1–2.2)
ALP SERPL-CCNC: 83 U/L (ref 40–129)
ALT SERPL-CCNC: 16 U/L (ref 10–40)
ANION GAP SERPL CALCULATED.3IONS-SCNC: 14 MMOL/L (ref 3–16)
AST SERPL-CCNC: 24 U/L (ref 15–37)
BILIRUB SERPL-MCNC: 0.8 MG/DL (ref 0–1)
BUN SERPL-MCNC: 13 MG/DL (ref 7–20)
CALCIUM SERPL-MCNC: 9.7 MG/DL (ref 8.3–10.6)
CHLORIDE SERPL-SCNC: 98 MMOL/L (ref 99–110)
CO2 SERPL-SCNC: 24 MMOL/L (ref 21–32)
CREAT SERPL-MCNC: 1 MG/DL (ref 0.8–1.3)
EST. AVERAGE GLUCOSE BLD GHB EST-MCNC: 145.6 MG/DL
GFR SERPLBLD CREATININE-BSD FMLA CKD-EPI: >60 ML/MIN/{1.73_M2}
GLUCOSE SERPL-MCNC: 118 MG/DL (ref 70–99)
HBA1C MFR BLD: 6.7 %
POTASSIUM SERPL-SCNC: 4.6 MMOL/L (ref 3.5–5.1)
PROT SERPL-MCNC: 7.6 G/DL (ref 6.4–8.2)
PSA SERPL DL<=0.01 NG/ML-MCNC: 5.83 NG/ML (ref 0–4)
SODIUM SERPL-SCNC: 136 MMOL/L (ref 136–145)

## 2023-09-28 PROBLEM — R97.20 ELEVATED PSA: Status: ACTIVE | Noted: 2023-09-28

## 2023-09-29 ENCOUNTER — TELEPHONE (OUTPATIENT)
Dept: FAMILY MEDICINE CLINIC | Age: 78
End: 2023-09-29

## 2023-09-29 NOTE — TELEPHONE ENCOUNTER
Returning call. Notified pt of message on lab results:      [facility]   9/28/2023  4:49 PM EDT Back to Top      Called and left vm for pt to return call to receive lab results.      Murtaza Jeter MD   9/28/2023  2:13 PM EDT       PSA is somewhat elevated  Recheck 6 weeks at OV please schedule       Pt has scheduled appt for 6 weeks

## 2023-11-06 NOTE — PROGRESS NOTES
2023    Thien Marti (:  1945) is a 66 y.o. male, here for evaluation of the following chief complaint(s):  Discuss Labs (Recheck psa)      ASSESSMENT/PLAN:     Diagnosis Orders   1. Elevated PSA  PSA, Prostatic Specific Antigen    recheck PA; d/w pt need to eval urology if trend continues r/o PCA          Return for HTN. An electronic signature was used to authenticate this note.     SUBJECTIVE/OBJECTIVE:  (NOTE : prior results listed below reviewed at this visit to assist in medical decision making.)    HPI / ROS  # PSA elevated in past.  Lab Results   Component Value Date    PSA 5.83 (H) 2023    PSA 2.82 2021    PSA 3.01 07/10/2020             Wt Readings from Last 3 Encounters:   23 103.4 kg (228 lb)   23 101.6 kg (224 lb)   04/10/23 103.5 kg (228 lb 3.2 oz)       BP Readings from Last 3 Encounters:   23 134/88   23 138/86   04/10/23 (!) 152/92       PHYSICAL EXAM  Vitals:    23 0753   BP: 134/88   Pulse: 67   Resp: 16   SpO2: 96%   Weight: 103.4 kg (228 lb)     A&o

## 2023-11-07 ENCOUNTER — OFFICE VISIT (OUTPATIENT)
Dept: FAMILY MEDICINE CLINIC | Age: 78
End: 2023-11-07
Payer: COMMERCIAL

## 2023-11-07 ENCOUNTER — OFFICE VISIT (OUTPATIENT)
Dept: FAMILY MEDICINE CLINIC | Age: 78
End: 2023-11-07

## 2023-11-07 VITALS
SYSTOLIC BLOOD PRESSURE: 134 MMHG | DIASTOLIC BLOOD PRESSURE: 88 MMHG | BODY MASS INDEX: 32.71 KG/M2 | RESPIRATION RATE: 16 BRPM | OXYGEN SATURATION: 96 % | HEART RATE: 67 BPM | WEIGHT: 228 LBS

## 2023-11-07 VITALS — WEIGHT: 228 LBS | BODY MASS INDEX: 32.64 KG/M2 | HEIGHT: 70 IN

## 2023-11-07 DIAGNOSIS — Z00.00 MEDICARE ANNUAL WELLNESS VISIT, SUBSEQUENT: Primary | ICD-10-CM

## 2023-11-07 DIAGNOSIS — R97.20 ELEVATED PSA: Primary | ICD-10-CM

## 2023-11-07 LAB — PSA SERPL DL<=0.01 NG/ML-MCNC: 4.22 NG/ML (ref 0–4)

## 2023-11-07 PROCEDURE — G0439 PPPS, SUBSEQ VISIT: HCPCS | Performed by: FAMILY MEDICINE

## 2023-11-07 ASSESSMENT — LIFESTYLE VARIABLES
HOW OFTEN DO YOU HAVE A DRINK CONTAINING ALCOHOL: 4 OR MORE TIMES A WEEK
HAVE YOU OR SOMEONE ELSE BEEN INJURED AS A RESULT OF YOUR DRINKING: 0
HOW OFTEN DURING THE LAST YEAR HAVE YOU NEEDED AN ALCOHOLIC DRINK FIRST THING IN THE MORNING TO GET YOURSELF GOING AFTER A NIGHT OF HEAVY DRINKING: 0
HAS A RELATIVE, FRIEND, DOCTOR, OR ANOTHER HEALTH PROFESSIONAL EXPRESSED CONCERN ABOUT YOUR DRINKING OR SUGGESTED YOU CUT DOWN: 0
HOW OFTEN DURING THE LAST YEAR HAVE YOU BEEN UNABLE TO REMEMBER WHAT HAPPENED THE NIGHT BEFORE BECAUSE YOU HAD BEEN DRINKING: 0
HOW OFTEN DURING THE LAST YEAR HAVE YOU FAILED TO DO WHAT WAS NORMALLY EXPECTED FROM YOU BECAUSE OF DRINKING: 0
HOW OFTEN DURING THE LAST YEAR HAVE YOU HAD A FEELING OF GUILT OR REMORSE AFTER DRINKING: 0
HOW OFTEN DURING THE LAST YEAR HAVE YOU FOUND THAT YOU WERE NOT ABLE TO STOP DRINKING ONCE YOU HAD STARTED: 0
HOW MANY STANDARD DRINKS CONTAINING ALCOHOL DO YOU HAVE ON A TYPICAL DAY: 1 OR 2

## 2023-11-07 ASSESSMENT — PATIENT HEALTH QUESTIONNAIRE - PHQ9
1. LITTLE INTEREST OR PLEASURE IN DOING THINGS: 1
SUM OF ALL RESPONSES TO PHQ QUESTIONS 1-9: 2
2. FEELING DOWN, DEPRESSED OR HOPELESS: 1
SUM OF ALL RESPONSES TO PHQ QUESTIONS 1-9: 2
10. IF YOU CHECKED OFF ANY PROBLEMS, HOW DIFFICULT HAVE THESE PROBLEMS MADE IT FOR YOU TO DO YOUR WORK, TAKE CARE OF THINGS AT HOME, OR GET ALONG WITH OTHER PEOPLE: 0
3. TROUBLE FALLING OR STAYING ASLEEP: 0
5. POOR APPETITE OR OVEREATING: 0
SUM OF ALL RESPONSES TO PHQ9 QUESTIONS 1 & 2: 2
SUM OF ALL RESPONSES TO PHQ QUESTIONS 1-9: 2
SUM OF ALL RESPONSES TO PHQ QUESTIONS 1-9: 2
4. FEELING TIRED OR HAVING LITTLE ENERGY: 0
1. LITTLE INTEREST OR PLEASURE IN DOING THINGS: 1
7. TROUBLE CONCENTRATING ON THINGS, SUCH AS READING THE NEWSPAPER OR WATCHING TELEVISION: 0
6. FEELING BAD ABOUT YOURSELF - OR THAT YOU ARE A FAILURE OR HAVE LET YOURSELF OR YOUR FAMILY DOWN: 0
SUM OF ALL RESPONSES TO PHQ QUESTIONS 1-9: 2
9. THOUGHTS THAT YOU WOULD BE BETTER OFF DEAD, OR OF HURTING YOURSELF: 0
SUM OF ALL RESPONSES TO PHQ9 QUESTIONS 1 & 2: 2
8. MOVING OR SPEAKING SO SLOWLY THAT OTHER PEOPLE COULD HAVE NOTICED. OR THE OPPOSITE, BEING SO FIGETY OR RESTLESS THAT YOU HAVE BEEN MOVING AROUND A LOT MORE THAN USUAL: 0
2. FEELING DOWN, DEPRESSED OR HOPELESS: 1

## 2023-11-07 NOTE — PATIENT INSTRUCTIONS

## 2023-11-09 ENCOUNTER — TELEPHONE (OUTPATIENT)
Dept: FAMILY MEDICINE CLINIC | Age: 78
End: 2023-11-09

## 2023-11-09 NOTE — TELEPHONE ENCOUNTER
Iva Cervantes LPN   96/8/4310 32:45 PM EST Back to Top      Called Kenyon and left VM per hipp with result note    Thomas Alaniz MD   11/9/2023 12:12 PM EST       PSA coming down nicely. We will recheck in 6 months   Pt called back and I informed him of results. Pt stated an understanding.

## 2024-02-02 DIAGNOSIS — I10 ESSENTIAL HYPERTENSION: ICD-10-CM

## 2024-02-02 RX ORDER — METOPROLOL SUCCINATE 200 MG/1
TABLET, EXTENDED RELEASE ORAL
Qty: 90 TABLET | Refills: 3 | Status: SHIPPED | OUTPATIENT
Start: 2024-02-02

## 2024-02-02 RX ORDER — AMLODIPINE BESYLATE AND BENAZEPRIL HYDROCHLORIDE 10; 20 MG/1; MG/1
CAPSULE ORAL
Qty: 90 CAPSULE | Refills: 3 | Status: SHIPPED | OUTPATIENT
Start: 2024-02-02

## 2024-02-02 RX ORDER — FENOFIBRATE 145 MG/1
TABLET, COATED ORAL
Qty: 90 TABLET | Refills: 3 | Status: SHIPPED | OUTPATIENT
Start: 2024-02-02

## 2024-02-02 RX ORDER — HYDROCHLOROTHIAZIDE 25 MG/1
25 TABLET ORAL DAILY
Qty: 90 TABLET | Refills: 3 | Status: SHIPPED | OUTPATIENT
Start: 2024-02-02

## 2024-02-02 NOTE — TELEPHONE ENCOUNTER
Medication:   Requested Prescriptions     Pending Prescriptions Disp Refills    fenofibrate (TRICOR) 145 MG tablet [Pharmacy Med Name: FENOFIBRATE 145 MG TABLET] 90 tablet 3     Sig: TAKE ONE TABLET BY MOUTH DAILY    metoprolol succinate (TOPROL XL) 200 MG extended release tablet [Pharmacy Med Name: METOPROLOL SUCC  MG TAB] 90 tablet 3     Sig: TAKE ONE TABLET BY MOUTH DAILY    hydroCHLOROthiazide (HYDRODIURIL) 25 MG tablet [Pharmacy Med Name: hydroCHLOROthiazide 25 MG TABLET] 90 tablet 3     Sig: TAKE ONE TABLET BY MOUTH DAILY    amLODIPine-benazepril (LOTREL) 10-20 MG per capsule [Pharmacy Med Name: amLODIPine-BENAZEPRIL 10-20 MG CAP] 90 capsule 3     Sig: TAKE ONE CAPSULE BY MOUTH DAILY       Last Filled:  8/19/2022, 1/15/2022, 12/20/2022 and 7/27/2022    Patient Phone Number: [phone] (home)     Last appt: 11/7/2023   Next appt: 3/26/2024

## 2024-03-28 PROBLEM — F32.1 CURRENT MODERATE EPISODE OF MAJOR DEPRESSIVE DISORDER WITHOUT PRIOR EPISODE (HCC): Status: ACTIVE | Noted: 2024-03-28

## 2024-03-28 NOTE — PROGRESS NOTES
141 2024 08:28 AM    CALCIUM 9.8 2024 08:28 AM       # Hyperlipidemia on statin shane this no myalgias or weakness  Lab Results   Component Value Date    LDLCALC 156 (H) 2024    LDLDIRECT 134 (H) 2022      Lab Results   Component Value Date    ALT 14 2024    AST 25 2024    ALKPHOS 106 2024    BILITOT 0.6 2024      Lipids due    # PSA elevated in past.  Lab Results   Component Value Date    PSA 4.69 (H) 2024    PSA 4.22 (H) 2023    PSA 5.83 (H) 2023     # DM2  Lab Results   Component Value Date    LABA1C 6.8 2024     No results found for: \"MALBCR\"          3/29/2024    Baudilio Maldonado (:  1945) is a 78 y.o. male, here for evaluation of the following chief complaint(s):  No chief complaint on file.      ASSESSMENT/PLAN:     Diagnosis Orders   1. Controlled type 2 diabetes mellitus without complication, without long-term current use of insulin (HCC)  Hemoglobin A1C    Comprehensive Metabolic Panel    Microalbumin / Creatinine Urine Ratio    a1c was 6.7 last time; check a1c, renal, VERENICE on ace i      2. Major depressive disorder with single episode, in partial remission (HCC)      Taking Paxil but reports poor medical self care not taking meds: agrees to restra; al meds refilled; d/w pt poss additional RX; recheck 1 week      3. Primary hypertension  Comprehensive Metabolic Panel    not at goal; check renal; he reports noncompliance with meds due to depression; agrees to restart      4. Mixed hyperlipidemia  Comprehensive Metabolic Panel    Lipid Panel    LFTs LIPIDS on statin until recently but has not been taking meds for several weeks      5. Elevated PSA  PSA, Prostatic Specific Antigen    recheck today          Return in about 1 week (around 2024) for Depression, HTN.    An electronic signature was used to authenticate this note.    SUBJECTIVE/OBJECTIVE:  (NOTE : prior results listed below reviewed at this visit to assist in medical

## 2024-03-29 ENCOUNTER — OFFICE VISIT (OUTPATIENT)
Dept: FAMILY MEDICINE CLINIC | Age: 79
End: 2024-03-29

## 2024-03-29 VITALS
OXYGEN SATURATION: 98 % | HEIGHT: 70 IN | HEART RATE: 71 BPM | WEIGHT: 226 LBS | BODY MASS INDEX: 32.35 KG/M2 | SYSTOLIC BLOOD PRESSURE: 162 MMHG | RESPIRATION RATE: 18 BRPM | DIASTOLIC BLOOD PRESSURE: 106 MMHG

## 2024-03-29 DIAGNOSIS — F32.4 MAJOR DEPRESSIVE DISORDER WITH SINGLE EPISODE, IN PARTIAL REMISSION (HCC): ICD-10-CM

## 2024-03-29 DIAGNOSIS — E78.2 MIXED HYPERLIPIDEMIA: ICD-10-CM

## 2024-03-29 DIAGNOSIS — I10 PRIMARY HYPERTENSION: ICD-10-CM

## 2024-03-29 DIAGNOSIS — E11.9 CONTROLLED TYPE 2 DIABETES MELLITUS WITHOUT COMPLICATION, WITHOUT LONG-TERM CURRENT USE OF INSULIN (HCC): Primary | ICD-10-CM

## 2024-03-29 DIAGNOSIS — R97.20 ELEVATED PSA: ICD-10-CM

## 2024-03-29 LAB
ALBUMIN SERPL-MCNC: 4.6 G/DL (ref 3.4–5)
ALBUMIN/GLOB SERPL: 1.4 {RATIO} (ref 1.1–2.2)
ALP SERPL-CCNC: 106 U/L (ref 40–129)
ALT SERPL-CCNC: 14 U/L (ref 10–40)
ANION GAP SERPL CALCULATED.3IONS-SCNC: 14 MMOL/L (ref 3–16)
AST SERPL-CCNC: 25 U/L (ref 15–37)
BILIRUB SERPL-MCNC: 0.6 MG/DL (ref 0–1)
BUN SERPL-MCNC: 12 MG/DL (ref 7–20)
CALCIUM SERPL-MCNC: 9.8 MG/DL (ref 8.3–10.6)
CHLORIDE SERPL-SCNC: 100 MMOL/L (ref 99–110)
CHOLEST SERPL-MCNC: 237 MG/DL (ref 0–199)
CO2 SERPL-SCNC: 26 MMOL/L (ref 21–32)
CREAT SERPL-MCNC: 1 MG/DL (ref 0.8–1.3)
GFR SERPLBLD CREATININE-BSD FMLA CKD-EPI: 77 ML/MIN/{1.73_M2}
GLUCOSE SERPL-MCNC: 141 MG/DL (ref 70–99)
HDLC SERPL-MCNC: 40 MG/DL (ref 40–60)
LDLC SERPL CALC-MCNC: 156 MG/DL
POTASSIUM SERPL-SCNC: 5.1 MMOL/L (ref 3.5–5.1)
PROT SERPL-MCNC: 7.9 G/DL (ref 6.4–8.2)
PSA SERPL DL<=0.01 NG/ML-MCNC: 4.69 NG/ML (ref 0–4)
SODIUM SERPL-SCNC: 140 MMOL/L (ref 136–145)
TRIGL SERPL-MCNC: 204 MG/DL (ref 0–150)
VLDLC SERPL CALC-MCNC: 41 MG/DL

## 2024-03-29 ASSESSMENT — PATIENT HEALTH QUESTIONNAIRE - PHQ9
6. FEELING BAD ABOUT YOURSELF - OR THAT YOU ARE A FAILURE OR HAVE LET YOURSELF OR YOUR FAMILY DOWN: NOT AT ALL
4. FEELING TIRED OR HAVING LITTLE ENERGY: NOT AT ALL
10. IF YOU CHECKED OFF ANY PROBLEMS, HOW DIFFICULT HAVE THESE PROBLEMS MADE IT FOR YOU TO DO YOUR WORK, TAKE CARE OF THINGS AT HOME, OR GET ALONG WITH OTHER PEOPLE: NOT DIFFICULT AT ALL
SUM OF ALL RESPONSES TO PHQ9 QUESTIONS 1 & 2: 0
2. FEELING DOWN, DEPRESSED OR HOPELESS: NOT AT ALL
3. TROUBLE FALLING OR STAYING ASLEEP: NOT AT ALL
9. THOUGHTS THAT YOU WOULD BE BETTER OFF DEAD, OR OF HURTING YOURSELF: NOT AT ALL
5. POOR APPETITE OR OVEREATING: NOT AT ALL
7. TROUBLE CONCENTRATING ON THINGS, SUCH AS READING THE NEWSPAPER OR WATCHING TELEVISION: NOT AT ALL
8. MOVING OR SPEAKING SO SLOWLY THAT OTHER PEOPLE COULD HAVE NOTICED. OR THE OPPOSITE, BEING SO FIGETY OR RESTLESS THAT YOU HAVE BEEN MOVING AROUND A LOT MORE THAN USUAL: NOT AT ALL
SUM OF ALL RESPONSES TO PHQ QUESTIONS 1-9: 0
1. LITTLE INTEREST OR PLEASURE IN DOING THINGS: NOT AT ALL

## 2024-03-30 LAB
EST. AVERAGE GLUCOSE BLD GHB EST-MCNC: 148.5 MG/DL
HBA1C MFR BLD: 6.8 %

## 2024-04-01 RX ORDER — PAROXETINE HYDROCHLORIDE 40 MG/1
40 TABLET, FILM COATED ORAL DAILY
Qty: 90 TABLET | Refills: 3 | Status: SHIPPED | OUTPATIENT
Start: 2024-04-01 | End: 2025-03-27

## 2024-04-01 NOTE — TELEPHONE ENCOUNTER
Discussed with aurora carbajal during visit discussed starting a new med but I do not see it ordered. Did need paxil refilled though

## 2024-04-17 ENCOUNTER — OFFICE VISIT (OUTPATIENT)
Dept: FAMILY MEDICINE CLINIC | Age: 79
End: 2024-04-17

## 2024-04-17 VITALS
WEIGHT: 222 LBS | DIASTOLIC BLOOD PRESSURE: 82 MMHG | BODY MASS INDEX: 31.78 KG/M2 | SYSTOLIC BLOOD PRESSURE: 136 MMHG | HEART RATE: 61 BPM | HEIGHT: 70 IN | OXYGEN SATURATION: 97 % | RESPIRATION RATE: 18 BRPM

## 2024-04-17 VITALS — WEIGHT: 222 LBS | BODY MASS INDEX: 31.78 KG/M2 | HEIGHT: 70 IN

## 2024-04-17 DIAGNOSIS — I10 PRIMARY HYPERTENSION: ICD-10-CM

## 2024-04-17 DIAGNOSIS — Z00.00 MEDICARE ANNUAL WELLNESS VISIT, SUBSEQUENT: Primary | ICD-10-CM

## 2024-04-17 DIAGNOSIS — F32.4 MAJOR DEPRESSIVE DISORDER WITH SINGLE EPISODE, IN PARTIAL REMISSION (HCC): Primary | ICD-10-CM

## 2024-04-17 RX ORDER — METHYLPHENIDATE HYDROCHLORIDE 5 MG/1
5 TABLET ORAL DAILY
Qty: 30 TABLET | Refills: 0 | Status: CANCELLED | OUTPATIENT
Start: 2024-04-17 | End: 2024-05-17

## 2024-04-17 SDOH — ECONOMIC STABILITY: FOOD INSECURITY: WITHIN THE PAST 12 MONTHS, YOU WORRIED THAT YOUR FOOD WOULD RUN OUT BEFORE YOU GOT MONEY TO BUY MORE.: NEVER TRUE

## 2024-04-17 SDOH — ECONOMIC STABILITY: HOUSING INSECURITY
IN THE LAST 12 MONTHS, WAS THERE A TIME WHEN YOU DID NOT HAVE A STEADY PLACE TO SLEEP OR SLEPT IN A SHELTER (INCLUDING NOW)?: NO

## 2024-04-17 SDOH — ECONOMIC STABILITY: FOOD INSECURITY: WITHIN THE PAST 12 MONTHS, THE FOOD YOU BOUGHT JUST DIDN'T LAST AND YOU DIDN'T HAVE MONEY TO GET MORE.: NEVER TRUE

## 2024-04-17 SDOH — ECONOMIC STABILITY: INCOME INSECURITY: HOW HARD IS IT FOR YOU TO PAY FOR THE VERY BASICS LIKE FOOD, HOUSING, MEDICAL CARE, AND HEATING?: NOT HARD AT ALL

## 2024-04-17 ASSESSMENT — PATIENT HEALTH QUESTIONNAIRE - PHQ9
5. POOR APPETITE OR OVEREATING: NOT AT ALL
4. FEELING TIRED OR HAVING LITTLE ENERGY: NOT AT ALL
SUM OF ALL RESPONSES TO PHQ9 QUESTIONS 1 & 2: 2
7. TROUBLE CONCENTRATING ON THINGS, SUCH AS READING THE NEWSPAPER OR WATCHING TELEVISION: NOT AT ALL
1. LITTLE INTEREST OR PLEASURE IN DOING THINGS: SEVERAL DAYS
9. THOUGHTS THAT YOU WOULD BE BETTER OFF DEAD, OR OF HURTING YOURSELF: NOT AT ALL
SUM OF ALL RESPONSES TO PHQ QUESTIONS 1-9: 3
8. MOVING OR SPEAKING SO SLOWLY THAT OTHER PEOPLE COULD HAVE NOTICED. OR THE OPPOSITE, BEING SO FIGETY OR RESTLESS THAT YOU HAVE BEEN MOVING AROUND A LOT MORE THAN USUAL: NOT AT ALL
SUM OF ALL RESPONSES TO PHQ QUESTIONS 1-9: 3
8. MOVING OR SPEAKING SO SLOWLY THAT OTHER PEOPLE COULD HAVE NOTICED. OR THE OPPOSITE, BEING SO FIGETY OR RESTLESS THAT YOU HAVE BEEN MOVING AROUND A LOT MORE THAN USUAL: NOT AT ALL
4. FEELING TIRED OR HAVING LITTLE ENERGY: NOT AT ALL
7. TROUBLE CONCENTRATING ON THINGS, SUCH AS READING THE NEWSPAPER OR WATCHING TELEVISION: NOT AT ALL
9. THOUGHTS THAT YOU WOULD BE BETTER OFF DEAD, OR OF HURTING YOURSELF: NOT AT ALL
3. TROUBLE FALLING OR STAYING ASLEEP: SEVERAL DAYS
5. POOR APPETITE OR OVEREATING: NOT AT ALL
10. IF YOU CHECKED OFF ANY PROBLEMS, HOW DIFFICULT HAVE THESE PROBLEMS MADE IT FOR YOU TO DO YOUR WORK, TAKE CARE OF THINGS AT HOME, OR GET ALONG WITH OTHER PEOPLE: NOT DIFFICULT AT ALL
SUM OF ALL RESPONSES TO PHQ QUESTIONS 1-9: 3
6. FEELING BAD ABOUT YOURSELF - OR THAT YOU ARE A FAILURE OR HAVE LET YOURSELF OR YOUR FAMILY DOWN: NOT AT ALL
SUM OF ALL RESPONSES TO PHQ QUESTIONS 1-9: 3
6. FEELING BAD ABOUT YOURSELF - OR THAT YOU ARE A FAILURE OR HAVE LET YOURSELF OR YOUR FAMILY DOWN: NOT AT ALL
SUM OF ALL RESPONSES TO PHQ9 QUESTIONS 1 & 2: 2
2. FEELING DOWN, DEPRESSED OR HOPELESS: SEVERAL DAYS
1. LITTLE INTEREST OR PLEASURE IN DOING THINGS: SEVERAL DAYS
3. TROUBLE FALLING OR STAYING ASLEEP: SEVERAL DAYS
SUM OF ALL RESPONSES TO PHQ QUESTIONS 1-9: 3
SUM OF ALL RESPONSES TO PHQ QUESTIONS 1-9: 3
10. IF YOU CHECKED OFF ANY PROBLEMS, HOW DIFFICULT HAVE THESE PROBLEMS MADE IT FOR YOU TO DO YOUR WORK, TAKE CARE OF THINGS AT HOME, OR GET ALONG WITH OTHER PEOPLE: NOT DIFFICULT AT ALL
2. FEELING DOWN, DEPRESSED OR HOPELESS: SEVERAL DAYS
SUM OF ALL RESPONSES TO PHQ QUESTIONS 1-9: 3
SUM OF ALL RESPONSES TO PHQ QUESTIONS 1-9: 3

## 2024-04-17 ASSESSMENT — LIFESTYLE VARIABLES
HOW OFTEN DO YOU HAVE A DRINK CONTAINING ALCOHOL: 2-4 TIMES A MONTH
HOW MANY STANDARD DRINKS CONTAINING ALCOHOL DO YOU HAVE ON A TYPICAL DAY: 1 OR 2

## 2024-04-17 NOTE — PROGRESS NOTES
2024    Baudilio Maldonado (:  1945) is a 78 y.o. male, here for evaluation of the following chief complaint(s):  Depression      ASSESSMENT/PLAN:     Diagnosis Orders   1. Major depressive disorder with single episode, in partial remission (HCC)      on Paxil 40 feels much better currentl;y consider new RX low dose Ritalin if recurs      2. Primary hypertension      at goal now while complaiunt w meds          Return in about 6 months (around 10/17/2024) for Depression, HTN.    An electronic signature was used to authenticate this note.    SUBJECTIVE/OBJECTIVE:  (NOTE : prior results listed below reviewed at this visit to assist in medical decision making.)    HPI / ROS    # HTN - shane meds no CP/SOB  BP Readings from Last 3 Encounters:   24 136/82   24 (!) 162/106   23 134/88     Lab Results   Component Value Date/Time     2024 08:28 AM    K 5.1 2024 08:28 AM     2024 08:28 AM    CO2 26 2024 08:28 AM    BUN 12 2024 08:28 AM    CREATININE 1.0 2024 08:28 AM    GLUCOSE 141 2024 08:28 AM    CALCIUM 9.8 2024 08:28 AM       F.u after noncompliance w meds    # refractory major depression we discuss add on Ritalin late if needed however he is much better per his reports since taking paxil        Wt Readings from Last 3 Encounters:   24 100.7 kg (222 lb)   24 102.5 kg (226 lb)   23 103.4 kg (228 lb)       BP Readings from Last 3 Encounters:   24 136/82   24 (!) 162/106   23 134/88       PHYSICAL EXAM  Vitals:    24 0926   BP: 136/82   Pulse: 61   Resp: 18   SpO2: 97%   Weight: 100.7 kg (222 lb)   Height: 1.778 m (5' 10\")     A&o

## 2024-04-17 NOTE — PROGRESS NOTES
Medicare Annual Wellness Visit    Baudilio Maldonado is here for Medicare AWV    Assessment & Plan   Medicare annual wellness visit, subsequent  Recommendations for Preventive Services Due: see orders and patient instructions/AVS.  Recommended screening schedule for the next 5-10 years is provided to the patient in written form: see Patient Instructions/AVS.     No follow-ups on file.     Subjective       Patient's complete Health Risk Assessment and screening values have been reviewed and are found in Flowsheets. The following problems were reviewed today and where indicated follow up appointments were made and/or referrals ordered.    Positive Risk Factor Screenings with Interventions:                Activity, Diet, and Weight:  On average, how many days per week do you engage in moderate to strenuous exercise (like a brisk walk)?: 4 days  On average, how many minutes do you engage in exercise at this level?: 60 min    Do you eat balanced/healthy meals regularly?: (!) No    Body mass index is 31.85 kg/m². (!) Abnormal    Do you eat balanced/healthy meals regularly Interventions:  Patient declines any further evaluation or treatment  Obesity Interventions:  Patient declines any further evaluation or treatment            Dentist Screen:  Have you seen the dentist within the past year?: (!) No    Intervention:  Patient declines any further evaluation or treatment                      Objective   Vitals:    04/17/24 0945   Weight: 100.7 kg (222 lb)   Height: 1.778 m (5' 10\")      Body mass index is 31.85 kg/m².               No Known Allergies  Prior to Visit Medications    Medication Sig Taking? Authorizing Provider   PARoxetine (PAXIL) 40 MG tablet Take 1 tablet by mouth daily Yes Harry Gotti MD   fenofibrate (TRICOR) 145 MG tablet TAKE ONE TABLET BY MOUTH DAILY Yes Portia Faye, APRN - CNP   metoprolol succinate (TOPROL XL) 200 MG extended release tablet TAKE ONE TABLET BY MOUTH DAILY Yes Portia Faye

## 2024-10-16 ENCOUNTER — OFFICE VISIT (OUTPATIENT)
Dept: FAMILY MEDICINE CLINIC | Age: 79
End: 2024-10-16

## 2024-10-16 VITALS
WEIGHT: 226 LBS | OXYGEN SATURATION: 97 % | BODY MASS INDEX: 32.35 KG/M2 | DIASTOLIC BLOOD PRESSURE: 104 MMHG | HEART RATE: 54 BPM | SYSTOLIC BLOOD PRESSURE: 168 MMHG | RESPIRATION RATE: 18 BRPM | HEIGHT: 70 IN

## 2024-10-16 DIAGNOSIS — E78.2 MIXED HYPERLIPIDEMIA: ICD-10-CM

## 2024-10-16 DIAGNOSIS — E11.9 CONTROLLED TYPE 2 DIABETES MELLITUS WITHOUT COMPLICATION, WITHOUT LONG-TERM CURRENT USE OF INSULIN (HCC): Primary | ICD-10-CM

## 2024-10-16 DIAGNOSIS — R97.20 ELEVATED PSA: ICD-10-CM

## 2024-10-16 DIAGNOSIS — F32.4 MAJOR DEPRESSIVE DISORDER WITH SINGLE EPISODE, IN PARTIAL REMISSION (HCC): ICD-10-CM

## 2024-10-16 DIAGNOSIS — I10 PRIMARY HYPERTENSION: ICD-10-CM

## 2024-10-16 LAB
ALBUMIN SERPL-MCNC: 4.3 G/DL (ref 3.4–5)
ALBUMIN/GLOB SERPL: 1.5 {RATIO} (ref 1.1–2.2)
ALP SERPL-CCNC: 76 U/L (ref 40–129)
ALT SERPL-CCNC: 20 U/L (ref 10–40)
ANION GAP SERPL CALCULATED.3IONS-SCNC: 12 MMOL/L (ref 3–16)
AST SERPL-CCNC: 27 U/L (ref 15–37)
BILIRUB SERPL-MCNC: 0.6 MG/DL (ref 0–1)
BUN SERPL-MCNC: 11 MG/DL (ref 7–20)
CALCIUM SERPL-MCNC: 10.1 MG/DL (ref 8.3–10.6)
CHLORIDE SERPL-SCNC: 102 MMOL/L (ref 99–110)
CO2 SERPL-SCNC: 28 MMOL/L (ref 21–32)
CREAT SERPL-MCNC: 0.9 MG/DL (ref 0.8–1.3)
EST. AVERAGE GLUCOSE BLD GHB EST-MCNC: 142.7 MG/DL
GFR SERPLBLD CREATININE-BSD FMLA CKD-EPI: 87 ML/MIN/{1.73_M2}
GLUCOSE SERPL-MCNC: 131 MG/DL (ref 70–99)
HBA1C MFR BLD: 6.6 %
POTASSIUM SERPL-SCNC: 5 MMOL/L (ref 3.5–5.1)
PROT SERPL-MCNC: 7.2 G/DL (ref 6.4–8.2)
PSA SERPL DL<=0.01 NG/ML-MCNC: 3.95 NG/ML (ref 0–4)
SODIUM SERPL-SCNC: 142 MMOL/L (ref 136–145)

## 2024-10-16 SDOH — ECONOMIC STABILITY: FOOD INSECURITY: WITHIN THE PAST 12 MONTHS, YOU WORRIED THAT YOUR FOOD WOULD RUN OUT BEFORE YOU GOT MONEY TO BUY MORE.: NEVER TRUE

## 2024-10-16 SDOH — ECONOMIC STABILITY: FOOD INSECURITY: WITHIN THE PAST 12 MONTHS, THE FOOD YOU BOUGHT JUST DIDN'T LAST AND YOU DIDN'T HAVE MONEY TO GET MORE.: NEVER TRUE

## 2024-10-16 SDOH — ECONOMIC STABILITY: INCOME INSECURITY: HOW HARD IS IT FOR YOU TO PAY FOR THE VERY BASICS LIKE FOOD, HOUSING, MEDICAL CARE, AND HEATING?: NOT HARD AT ALL

## 2024-10-16 ASSESSMENT — PATIENT HEALTH QUESTIONNAIRE - PHQ9
SUM OF ALL RESPONSES TO PHQ QUESTIONS 1-9: 1
2. FEELING DOWN, DEPRESSED OR HOPELESS: NOT AT ALL
SUM OF ALL RESPONSES TO PHQ QUESTIONS 1-9: 0
2. FEELING DOWN, DEPRESSED OR HOPELESS: NOT AT ALL
6. FEELING BAD ABOUT YOURSELF - OR THAT YOU ARE A FAILURE OR HAVE LET YOURSELF OR YOUR FAMILY DOWN: NOT AT ALL
3. TROUBLE FALLING OR STAYING ASLEEP: SEVERAL DAYS
4. FEELING TIRED OR HAVING LITTLE ENERGY: NOT AT ALL
SUM OF ALL RESPONSES TO PHQ QUESTIONS 1-9: 0
1. LITTLE INTEREST OR PLEASURE IN DOING THINGS: NOT AT ALL
SUM OF ALL RESPONSES TO PHQ QUESTIONS 1-9: 1
SUM OF ALL RESPONSES TO PHQ9 QUESTIONS 1 & 2: 0
5. POOR APPETITE OR OVEREATING: NOT AT ALL
9. THOUGHTS THAT YOU WOULD BE BETTER OFF DEAD, OR OF HURTING YOURSELF: NOT AT ALL
SUM OF ALL RESPONSES TO PHQ QUESTIONS 1-9: 0
8. MOVING OR SPEAKING SO SLOWLY THAT OTHER PEOPLE COULD HAVE NOTICED. OR THE OPPOSITE, BEING SO FIGETY OR RESTLESS THAT YOU HAVE BEEN MOVING AROUND A LOT MORE THAN USUAL: NOT AT ALL
SUM OF ALL RESPONSES TO PHQ9 QUESTIONS 1 & 2: 0
10. IF YOU CHECKED OFF ANY PROBLEMS, HOW DIFFICULT HAVE THESE PROBLEMS MADE IT FOR YOU TO DO YOUR WORK, TAKE CARE OF THINGS AT HOME, OR GET ALONG WITH OTHER PEOPLE: NOT DIFFICULT AT ALL
7. TROUBLE CONCENTRATING ON THINGS, SUCH AS READING THE NEWSPAPER OR WATCHING TELEVISION: NOT AT ALL
1. LITTLE INTEREST OR PLEASURE IN DOING THINGS: NOT AT ALL
SUM OF ALL RESPONSES TO PHQ QUESTIONS 1-9: 0
SUM OF ALL RESPONSES TO PHQ QUESTIONS 1-9: 1
SUM OF ALL RESPONSES TO PHQ QUESTIONS 1-9: 1

## 2024-10-16 NOTE — PROGRESS NOTES
10/16/2024    Baudilio Maldonado (:  1945) is a 78 y.o. male, here for evaluation of the following chief complaint(s):  Diabetes and Hypertension      ASSESSMENT/PLAN:     Diagnosis Orders   1. Controlled type 2 diabetes mellitus without complication, without long-term current use of insulin (HCC)  Comprehensive Metabolic Panel    Hemoglobin A1C    a1c had crept up to low DM range last time; recheck today      2. Primary hypertension      not at goal he has not taken his meds consistently including today; pls restart meds check renal      3. Mixed hyperlipidemia  Comprehensive Metabolic Panel    LFts on fibrate cont; declines statin DNT      4. Elevated PSA  PSA, Prostatic Specific Antigen    recheck today      5. Major depressive disorder with single episode, in partial remission (HCC)      OK Paxil per pt          Return in about 6 months (around 2025) for HTN, DM, Hyperlipidemia.    An electronic signature was used to authenticate this note.    SUBJECTIVE/OBJECTIVE:  (NOTE : prior results listed below reviewed at this visit to assist in medical decision making.)    HPI / ROS    # HTN - shane meds no CP/SOB  BP Readings from Last 3 Encounters:   10/16/24 (!) 168/104   24 136/82   24 (!) 162/106     Lab Results   Component Value Date/Time     2024 08:28 AM    K 5.1 2024 08:28 AM     2024 08:28 AM    CO2 26 2024 08:28 AM    BUN 12 2024 08:28 AM    CREATININE 1.0 2024 08:28 AM    GLUCOSE 141 2024 08:28 AM    CALCIUM 9.8 2024 08:28 AM       # Hyperlipidemia on statin shane this no myalgias or weakness  Lab Results   Component Value Date    ALT 14 2024    AST 25 2024    ALKPHOS 106 2024    BILITOT 0.6 2024      Lab Results   Component Value Date    CHOL 237 (H) 2024    TRIG 204 (H) 2024    HDL 40 2024     (H) 2024    VLDL 41 2024    CHOLHDLRATIO 5.9 (H) 2011     The 10-year

## 2024-10-17 ENCOUNTER — LAB (OUTPATIENT)
Dept: FAMILY MEDICINE CLINIC | Age: 79
End: 2024-10-17
Payer: COMMERCIAL

## 2024-10-17 DIAGNOSIS — Z23 FLU VACCINE NEED: Primary | ICD-10-CM

## 2024-10-17 PROCEDURE — G0008 ADMIN INFLUENZA VIRUS VAC: HCPCS | Performed by: FAMILY MEDICINE

## 2024-10-17 PROCEDURE — 90653 IIV ADJUVANT VACCINE IM: CPT | Performed by: FAMILY MEDICINE

## 2025-03-14 DIAGNOSIS — I10 ESSENTIAL HYPERTENSION: ICD-10-CM

## 2025-03-14 RX ORDER — FENOFIBRATE 145 MG/1
TABLET, COATED ORAL
Qty: 90 TABLET | Refills: 3 | Status: SHIPPED | OUTPATIENT
Start: 2025-03-14

## 2025-03-14 RX ORDER — HYDROCHLOROTHIAZIDE 25 MG/1
25 TABLET ORAL DAILY
Qty: 90 TABLET | Refills: 3 | Status: SHIPPED | OUTPATIENT
Start: 2025-03-14

## 2025-03-14 RX ORDER — METOPROLOL SUCCINATE 200 MG/1
200 TABLET, EXTENDED RELEASE ORAL DAILY
Qty: 90 TABLET | Refills: 3 | Status: SHIPPED | OUTPATIENT
Start: 2025-03-14

## 2025-03-14 RX ORDER — AMLODIPINE AND BENAZEPRIL HYDROCHLORIDE 10; 20 MG/1; MG/1
CAPSULE ORAL
Qty: 90 CAPSULE | Refills: 3 | Status: SHIPPED | OUTPATIENT
Start: 2025-03-14

## 2025-03-14 NOTE — TELEPHONE ENCOUNTER
Medication:   Requested Prescriptions     Pending Prescriptions Disp Refills    metoprolol succinate (TOPROL XL) 200 MG extended release tablet 90 tablet 3     Sig: Take 1 tablet by mouth daily    hydroCHLOROthiazide (HYDRODIURIL) 25 MG tablet 90 tablet 3     Sig: Take 1 tablet by mouth daily    amLODIPine-benazepril (LOTREL) 10-20 MG per capsule 90 capsule 3     Sig: TAKE ONE CAPSULE BY MOUTH DAILY    fenofibrate (TRICOR) 145 MG tablet 90 tablet 3     Sig: TAKE ONE TABLET BY MOUTH DAILY        Last Filled:  02/02/2024    Patient Phone Number: [phone] (home)     Last appt: 10/16/2024   Next appt: 4/16/2025    Last OARRS:        No data to display

## 2025-04-16 ENCOUNTER — OFFICE VISIT (OUTPATIENT)
Dept: FAMILY MEDICINE CLINIC | Age: 80
End: 2025-04-16
Payer: COMMERCIAL

## 2025-04-16 VITALS
HEART RATE: 64 BPM | BODY MASS INDEX: 31.5 KG/M2 | WEIGHT: 220 LBS | RESPIRATION RATE: 20 BRPM | SYSTOLIC BLOOD PRESSURE: 172 MMHG | HEIGHT: 70 IN | DIASTOLIC BLOOD PRESSURE: 90 MMHG | OXYGEN SATURATION: 97 %

## 2025-04-16 DIAGNOSIS — F32.4 MAJOR DEPRESSIVE DISORDER WITH SINGLE EPISODE, IN PARTIAL REMISSION: ICD-10-CM

## 2025-04-16 DIAGNOSIS — E78.2 MIXED HYPERLIPIDEMIA: ICD-10-CM

## 2025-04-16 DIAGNOSIS — I10 PRIMARY HYPERTENSION: ICD-10-CM

## 2025-04-16 DIAGNOSIS — E11.9 CONTROLLED TYPE 2 DIABETES MELLITUS WITHOUT COMPLICATION, WITHOUT LONG-TERM CURRENT USE OF INSULIN: Primary | ICD-10-CM

## 2025-04-16 DIAGNOSIS — Z91.148 NONCOMPLIANCE WITH MEDICATION REGIMEN: ICD-10-CM

## 2025-04-16 LAB
ALBUMIN SERPL-MCNC: 4.4 G/DL (ref 3.4–5)
ALBUMIN/GLOB SERPL: 1.5 {RATIO} (ref 1.1–2.2)
ALP SERPL-CCNC: 67 U/L (ref 40–129)
ALT SERPL-CCNC: 16 U/L (ref 10–40)
ANION GAP SERPL CALCULATED.3IONS-SCNC: 13 MMOL/L (ref 3–16)
AST SERPL-CCNC: 24 U/L (ref 15–37)
BILIRUB SERPL-MCNC: 0.6 MG/DL (ref 0–1)
BUN SERPL-MCNC: 19 MG/DL (ref 7–20)
CALCIUM SERPL-MCNC: 9.8 MG/DL (ref 8.3–10.6)
CHLORIDE SERPL-SCNC: 101 MMOL/L (ref 99–110)
CHOLEST SERPL-MCNC: 208 MG/DL (ref 0–199)
CO2 SERPL-SCNC: 24 MMOL/L (ref 21–32)
CREAT SERPL-MCNC: 1.1 MG/DL (ref 0.8–1.3)
CREAT UR-MCNC: 67.6 MG/DL (ref 39–259)
EST. AVERAGE GLUCOSE BLD GHB EST-MCNC: 142.7 MG/DL
GFR SERPLBLD CREATININE-BSD FMLA CKD-EPI: 68 ML/MIN/{1.73_M2}
GLUCOSE SERPL-MCNC: 133 MG/DL (ref 70–99)
HBA1C MFR BLD: 6.6 %
HDLC SERPL-MCNC: 31 MG/DL (ref 40–60)
LDLC SERPL CALC-MCNC: 142 MG/DL
MICROALBUMIN UR DL<=1MG/L-MCNC: <1.2 MG/DL
MICROALBUMIN/CREAT UR: NORMAL MG/G (ref 0–30)
POTASSIUM SERPL-SCNC: 4.5 MMOL/L (ref 3.5–5.1)
PROT SERPL-MCNC: 7.4 G/DL (ref 6.4–8.2)
SODIUM SERPL-SCNC: 138 MMOL/L (ref 136–145)
TRIGL SERPL-MCNC: 174 MG/DL (ref 0–150)
VLDLC SERPL CALC-MCNC: 35 MG/DL

## 2025-04-16 PROCEDURE — 1159F MED LIST DOCD IN RCRD: CPT | Performed by: FAMILY MEDICINE

## 2025-04-16 PROCEDURE — 99214 OFFICE O/P EST MOD 30 MIN: CPT | Performed by: FAMILY MEDICINE

## 2025-04-16 PROCEDURE — 1123F ACP DISCUSS/DSCN MKR DOCD: CPT | Performed by: FAMILY MEDICINE

## 2025-04-16 PROCEDURE — 36415 COLL VENOUS BLD VENIPUNCTURE: CPT | Performed by: FAMILY MEDICINE

## 2025-04-16 PROCEDURE — G2211 COMPLEX E/M VISIT ADD ON: HCPCS | Performed by: FAMILY MEDICINE

## 2025-04-16 PROCEDURE — 3079F DIAST BP 80-89 MM HG: CPT | Performed by: FAMILY MEDICINE

## 2025-04-16 PROCEDURE — 3077F SYST BP >= 140 MM HG: CPT | Performed by: FAMILY MEDICINE

## 2025-04-16 SDOH — ECONOMIC STABILITY: FOOD INSECURITY: WITHIN THE PAST 12 MONTHS, THE FOOD YOU BOUGHT JUST DIDN'T LAST AND YOU DIDN'T HAVE MONEY TO GET MORE.: NEVER TRUE

## 2025-04-16 SDOH — HEALTH STABILITY: PHYSICAL HEALTH: ON AVERAGE, HOW MANY DAYS PER WEEK DO YOU ENGAGE IN MODERATE TO STRENUOUS EXERCISE (LIKE A BRISK WALK)?: 4 DAYS

## 2025-04-16 SDOH — HEALTH STABILITY: PHYSICAL HEALTH: ON AVERAGE, HOW MANY MINUTES DO YOU ENGAGE IN EXERCISE AT THIS LEVEL?: 120 MIN

## 2025-04-16 SDOH — ECONOMIC STABILITY: FOOD INSECURITY: WITHIN THE PAST 12 MONTHS, YOU WORRIED THAT YOUR FOOD WOULD RUN OUT BEFORE YOU GOT MONEY TO BUY MORE.: NEVER TRUE

## 2025-04-16 ASSESSMENT — LIFESTYLE VARIABLES
HAVE YOU OR SOMEONE ELSE BEEN INJURED AS A RESULT OF YOUR DRINKING: NO
HAS A RELATIVE, FRIEND, DOCTOR, OR ANOTHER HEALTH PROFESSIONAL EXPRESSED CONCERN ABOUT YOUR DRINKING OR SUGGESTED YOU CUT DOWN: NO
HAVE YOU OR SOMEONE ELSE BEEN INJURED AS A RESULT OF YOUR DRINKING: NO
HOW OFTEN DURING THE LAST YEAR HAVE YOU BEEN UNABLE TO REMEMBER WHAT HAPPENED THE NIGHT BEFORE BECAUSE YOU HAD BEEN DRINKING: NEVER
HOW OFTEN DURING THE LAST YEAR HAVE YOU FOUND THAT YOU WERE NOT ABLE TO STOP DRINKING ONCE YOU HAD STARTED: NEVER
HAS A RELATIVE, FRIEND, DOCTOR, OR ANOTHER HEALTH PROFESSIONAL EXPRESSED CONCERN ABOUT YOUR DRINKING OR SUGGESTED YOU CUT DOWN: NO
HOW OFTEN DURING THE LAST YEAR HAVE YOU NEEDED AN ALCOHOLIC DRINK FIRST THING IN THE MORNING TO GET YOURSELF GOING AFTER A NIGHT OF HEAVY DRINKING: NEVER
HOW OFTEN DURING THE LAST YEAR HAVE YOU FAILED TO DO WHAT WAS NORMALLY EXPECTED FROM YOU BECAUSE OF DRINKING: NEVER
HOW OFTEN DURING THE LAST YEAR HAVE YOU FAILED TO DO WHAT WAS NORMALLY EXPECTED FROM YOU BECAUSE OF DRINKING: NEVER
HOW OFTEN DURING THE LAST YEAR HAVE YOU FOUND THAT YOU WERE NOT ABLE TO STOP DRINKING ONCE YOU HAD STARTED: NEVER
HOW OFTEN DO YOU HAVE A DRINK CONTAINING ALCOHOL: 4 OR MORE TIMES A WEEK
HOW MANY STANDARD DRINKS CONTAINING ALCOHOL DO YOU HAVE ON A TYPICAL DAY: 1
HOW OFTEN DURING THE LAST YEAR HAVE YOU HAD A FEELING OF GUILT OR REMORSE AFTER DRINKING: NEVER
HOW OFTEN DURING THE LAST YEAR HAVE YOU NEEDED AN ALCOHOLIC DRINK FIRST THING IN THE MORNING TO GET YOURSELF GOING AFTER A NIGHT OF HEAVY DRINKING: NEVER
HOW OFTEN DO YOU HAVE SIX OR MORE DRINKS ON ONE OCCASION: 1
HOW OFTEN DURING THE LAST YEAR HAVE YOU BEEN UNABLE TO REMEMBER WHAT HAPPENED THE NIGHT BEFORE BECAUSE YOU HAD BEEN DRINKING: NEVER
HOW OFTEN DURING THE LAST YEAR HAVE YOU HAD A FEELING OF GUILT OR REMORSE AFTER DRINKING: NEVER
HOW MANY STANDARD DRINKS CONTAINING ALCOHOL DO YOU HAVE ON A TYPICAL DAY: 1 OR 2
HOW OFTEN DO YOU HAVE A DRINK CONTAINING ALCOHOL: 5

## 2025-04-16 ASSESSMENT — PATIENT HEALTH QUESTIONNAIRE - PHQ9
SUM OF ALL RESPONSES TO PHQ QUESTIONS 1-9: 0
2. FEELING DOWN, DEPRESSED OR HOPELESS: NOT AT ALL
5. POOR APPETITE OR OVEREATING: NOT AT ALL
10. IF YOU CHECKED OFF ANY PROBLEMS, HOW DIFFICULT HAVE THESE PROBLEMS MADE IT FOR YOU TO DO YOUR WORK, TAKE CARE OF THINGS AT HOME, OR GET ALONG WITH OTHER PEOPLE: NOT DIFFICULT AT ALL
3. TROUBLE FALLING OR STAYING ASLEEP: NOT AT ALL
6. FEELING BAD ABOUT YOURSELF - OR THAT YOU ARE A FAILURE OR HAVE LET YOURSELF OR YOUR FAMILY DOWN: NOT AT ALL
SUM OF ALL RESPONSES TO PHQ QUESTIONS 1-9: 0
8. MOVING OR SPEAKING SO SLOWLY THAT OTHER PEOPLE COULD HAVE NOTICED. OR THE OPPOSITE, BEING SO FIGETY OR RESTLESS THAT YOU HAVE BEEN MOVING AROUND A LOT MORE THAN USUAL: NOT AT ALL
SUM OF ALL RESPONSES TO PHQ QUESTIONS 1-9: 0
1. LITTLE INTEREST OR PLEASURE IN DOING THINGS: NOT AT ALL
7. TROUBLE CONCENTRATING ON THINGS, SUCH AS READING THE NEWSPAPER OR WATCHING TELEVISION: NOT AT ALL
SUM OF ALL RESPONSES TO PHQ QUESTIONS 1-9: 0
1. LITTLE INTEREST OR PLEASURE IN DOING THINGS: NOT AT ALL
SUM OF ALL RESPONSES TO PHQ QUESTIONS 1-9: 0
2. FEELING DOWN, DEPRESSED OR HOPELESS: NOT AT ALL
9. THOUGHTS THAT YOU WOULD BE BETTER OFF DEAD, OR OF HURTING YOURSELF: NOT AT ALL
4. FEELING TIRED OR HAVING LITTLE ENERGY: NOT AT ALL
SUM OF ALL RESPONSES TO PHQ QUESTIONS 1-9: 0

## 2025-04-16 NOTE — PROGRESS NOTES
03/29/2024    HDL 40 03/29/2024     (H) 03/29/2024    VLDL 41 03/29/2024    CHOLHDLRATIO 5.9 (H) 07/05/2011     The 10-year ASCVD risk score (Lucia ARAIZA, et al., 2019) is: 79.3%    Values used to calculate the score:      Age: 79 years      Sex: Male      Is Non- : No      Diabetic: Yes      Tobacco smoker: No      Systolic Blood Pressure: 172 mmHg      Is BP treated: Yes      HDL Cholesterol: 40 mg/dL      Total Cholesterol: 237 mg/dL      # PSA elevated in past.  Lab Results   Component Value Date    PSA 3.95 10/16/2024    PSA 4.69 (H) 03/29/2024    PSA 4.22 (H) 11/07/2023       # Depression - denies SI. OK on current med(s) per patient.    Wt Readings from Last 3 Encounters:   04/16/25 99.8 kg (220 lb)   10/16/24 102.5 kg (226 lb)   04/17/24 100.7 kg (222 lb)       BP Readings from Last 3 Encounters:   04/16/25 (!) 172/90   10/16/24 (!) 168/104   04/17/24 136/82       PHYSICAL EXAM  Vitals:    04/16/25 0842 04/16/25 0848   BP: (!) 164/88 (!) 172/90   Pulse: 64    Resp: 20    SpO2: 97%    Weight: 99.8 kg (220 lb)    Height: 1.778 m (5' 10\")      A&o  Car reg no MGR  Lungs cta  Ext no edema  Skin no jaundice  Eyes anicteric

## 2025-04-17 ENCOUNTER — OFFICE VISIT (OUTPATIENT)
Dept: FAMILY MEDICINE CLINIC | Age: 80
End: 2025-04-17
Payer: COMMERCIAL

## 2025-04-17 ENCOUNTER — RESULTS FOLLOW-UP (OUTPATIENT)
Dept: FAMILY MEDICINE CLINIC | Age: 80
End: 2025-04-17

## 2025-04-17 VITALS — HEIGHT: 70 IN | RESPIRATION RATE: 18 BRPM | WEIGHT: 220 LBS | BODY MASS INDEX: 31.5 KG/M2

## 2025-04-17 DIAGNOSIS — Z00.00 MEDICARE ANNUAL WELLNESS VISIT, SUBSEQUENT: Primary | ICD-10-CM

## 2025-04-17 PROCEDURE — 1123F ACP DISCUSS/DSCN MKR DOCD: CPT | Performed by: FAMILY MEDICINE

## 2025-04-17 PROCEDURE — G0439 PPPS, SUBSEQ VISIT: HCPCS | Performed by: FAMILY MEDICINE

## 2025-04-17 NOTE — PROGRESS NOTES
Medicare Annual Wellness Visit    Baudilio Maldonado is here for Medicare AWV    Assessment & Plan   Medicare annual wellness visit, subsequent     Return in 1 year (on 4/17/2026) for Medicare AWV.     Subjective       Patient's complete Health Risk Assessment and screening values have been reviewed and are found in Flowsheets. The following problems were reviewed today and where indicated follow up appointments were made and/or referrals ordered.    Positive Risk Factor Screenings with Interventions:               Poor Eating Habits/Diet:  Do you eat balanced/healthy meals regularly?: (!) (Proxy-Rptd) No  Interventions:  Patient declines any further evaluation or treatment    Abnormal BMI (obese):  Body mass index is 31.57 kg/m². (!) Abnormal  Interventions:  Patient declines any further evaluation or treatment        Dentist Screen:  Have you seen the dentist within the past year?: (!) (Proxy-Rptd) No    Intervention:  Patient declines any further evaluation or treatment                      Objective   Vitals:    04/17/25 1044   Resp: 18   Weight: 99.8 kg (220 lb)   Height: 1.778 m (5' 10\")      Body mass index is 31.57 kg/m².                  No Known Allergies  Prior to Visit Medications    Medication Sig Taking? Authorizing Provider   metoprolol succinate (TOPROL XL) 200 MG extended release tablet Take 1 tablet by mouth daily Yes Harry Gotti MD   hydroCHLOROthiazide (HYDRODIURIL) 25 MG tablet Take 1 tablet by mouth daily Yes Harry Gotti MD   amLODIPine-benazepril (LOTREL) 10-20 MG per capsule TAKE ONE CAPSULE BY MOUTH DAILY Yes Harry Gotti MD   fenofibrate (TRICOR) 145 MG tablet TAKE ONE TABLET BY MOUTH DAILY Yes Harry Gotti MD   PARoxetine (PAXIL) 40 MG tablet Take 1 tablet by mouth daily  Harry Gotti MD       [facility] (Including outside providers/suppliers regularly involved in providing care):   Patient Care Team:  Harry Gotti MD as PCP - General (Family

## 2025-04-17 NOTE — PATIENT INSTRUCTIONS

## 2025-04-24 ENCOUNTER — TELEPHONE (OUTPATIENT)
Dept: FAMILY MEDICINE CLINIC | Age: 80
End: 2025-04-24

## 2025-04-24 ENCOUNTER — OFFICE VISIT (OUTPATIENT)
Dept: FAMILY MEDICINE CLINIC | Age: 80
End: 2025-04-24
Payer: COMMERCIAL

## 2025-04-24 VITALS
BODY MASS INDEX: 31.5 KG/M2 | WEIGHT: 220 LBS | HEART RATE: 56 BPM | RESPIRATION RATE: 18 BRPM | HEIGHT: 70 IN | DIASTOLIC BLOOD PRESSURE: 82 MMHG | OXYGEN SATURATION: 94 % | SYSTOLIC BLOOD PRESSURE: 146 MMHG

## 2025-04-24 DIAGNOSIS — R29.6 FREQUENT FALLS: ICD-10-CM

## 2025-04-24 DIAGNOSIS — I10 PRIMARY HYPERTENSION: Primary | ICD-10-CM

## 2025-04-24 PROCEDURE — 3079F DIAST BP 80-89 MM HG: CPT | Performed by: FAMILY MEDICINE

## 2025-04-24 PROCEDURE — 99213 OFFICE O/P EST LOW 20 MIN: CPT | Performed by: FAMILY MEDICINE

## 2025-04-24 PROCEDURE — 1159F MED LIST DOCD IN RCRD: CPT | Performed by: FAMILY MEDICINE

## 2025-04-24 PROCEDURE — 3077F SYST BP >= 140 MM HG: CPT | Performed by: FAMILY MEDICINE

## 2025-04-24 PROCEDURE — 1123F ACP DISCUSS/DSCN MKR DOCD: CPT | Performed by: FAMILY MEDICINE

## 2025-04-24 PROCEDURE — G2211 COMPLEX E/M VISIT ADD ON: HCPCS | Performed by: FAMILY MEDICINE

## 2025-04-24 SDOH — ECONOMIC STABILITY: FOOD INSECURITY: WITHIN THE PAST 12 MONTHS, YOU WORRIED THAT YOUR FOOD WOULD RUN OUT BEFORE YOU GOT MONEY TO BUY MORE.: NEVER TRUE

## 2025-04-24 SDOH — ECONOMIC STABILITY: FOOD INSECURITY: WITHIN THE PAST 12 MONTHS, THE FOOD YOU BOUGHT JUST DIDN'T LAST AND YOU DIDN'T HAVE MONEY TO GET MORE.: NEVER TRUE

## 2025-04-24 ASSESSMENT — PATIENT HEALTH QUESTIONNAIRE - PHQ9
7. TROUBLE CONCENTRATING ON THINGS, SUCH AS READING THE NEWSPAPER OR WATCHING TELEVISION: NOT AT ALL
4. FEELING TIRED OR HAVING LITTLE ENERGY: NOT AT ALL
SUM OF ALL RESPONSES TO PHQ QUESTIONS 1-9: 0
SUM OF ALL RESPONSES TO PHQ QUESTIONS 1-9: 0
3. TROUBLE FALLING OR STAYING ASLEEP: NOT AT ALL
2. FEELING DOWN, DEPRESSED OR HOPELESS: NOT AT ALL
SUM OF ALL RESPONSES TO PHQ QUESTIONS 1-9: 0
1. LITTLE INTEREST OR PLEASURE IN DOING THINGS: NOT AT ALL
2. FEELING DOWN, DEPRESSED OR HOPELESS: NOT AT ALL
1. LITTLE INTEREST OR PLEASURE IN DOING THINGS: NOT AT ALL
10. IF YOU CHECKED OFF ANY PROBLEMS, HOW DIFFICULT HAVE THESE PROBLEMS MADE IT FOR YOU TO DO YOUR WORK, TAKE CARE OF THINGS AT HOME, OR GET ALONG WITH OTHER PEOPLE: NOT DIFFICULT AT ALL
SUM OF ALL RESPONSES TO PHQ QUESTIONS 1-9: 0
SUM OF ALL RESPONSES TO PHQ QUESTIONS 1-9: 0
9. THOUGHTS THAT YOU WOULD BE BETTER OFF DEAD, OR OF HURTING YOURSELF: NOT AT ALL
5. POOR APPETITE OR OVEREATING: NOT AT ALL
SUM OF ALL RESPONSES TO PHQ QUESTIONS 1-9: 0
6. FEELING BAD ABOUT YOURSELF - OR THAT YOU ARE A FAILURE OR HAVE LET YOURSELF OR YOUR FAMILY DOWN: NOT AT ALL
8. MOVING OR SPEAKING SO SLOWLY THAT OTHER PEOPLE COULD HAVE NOTICED. OR THE OPPOSITE, BEING SO FIGETY OR RESTLESS THAT YOU HAVE BEEN MOVING AROUND A LOT MORE THAN USUAL: NOT AT ALL

## 2025-04-24 NOTE — TELEPHONE ENCOUNTER
I called regina to give him his physical therapy information that temo asked me to schedule. Patient asked us to call him tomorrow as he is currently driving .      May 8th at 1:15  At   [address] [facility] [address]

## 2025-04-24 NOTE — PROGRESS NOTES
2025    Baudilio Maldonado (:  1945) is a 79 y.o. male, here for evaluation of the following chief complaint(s):  Hypertension (Is taking medication daily)      ASSESSMENT/PLAN:     Diagnosis Orders   1. Primary hypertension      now at goal for age > 60 cont meds again requested use pillbox      2. Frequent falls  Ambulatory referral to Physical Therapy    wants eval and help ref Fall Prev Therapy          Return in about 4 months (around 2025) for DM, Hyperlipidemia, HTN.    An electronic signature was used to authenticate this note.    SUBJECTIVE/OBJECTIVE:  (NOTE : prior results listed below reviewed at this visit to assist in medical decision making.)    HPI / ROS    # HTN - he is here for recheck after restarting med; now being consistent and voicers intends to do so moving forward    BP Readings from Last 3 Encounters:   25 (!) 146/82   25 (!) 172/90   10/16/24 (!) 168/104     Lab Results   Component Value Date/Time     2025 08:39 AM    K 4.5 2025 08:39 AM     2025 08:39 AM    CO2 24 2025 08:39 AM    BUN 19 2025 08:39 AM    CREATININE 1.1 2025 08:39 AM    GLUCOSE 133 2025 08:39 AM    CALCIUM 9.8 2025 08:39 AM               Wt Readings from Last 3 Encounters:   25 99.8 kg (220 lb)   25 99.8 kg (220 lb)   25 99.8 kg (220 lb)       BP Readings from Last 3 Encounters:   25 (!) 146/82   25 (!) 172/90   10/16/24 (!) 168/104       PHYSICAL EXAM  Vitals:    25 0850 25 0853   BP: (!) 150/86 (!) 146/82   Pulse: 56    Resp: 18    SpO2: 94%    Weight: 99.8 kg (220 lb)    Height: 1.778 m (5' 10\")      A&o  Car reg

## 2025-05-08 ENCOUNTER — HOSPITAL ENCOUNTER (OUTPATIENT)
Dept: PHYSICAL THERAPY | Age: 80
Setting detail: THERAPIES SERIES
Discharge: HOME | End: 2025-05-08
Payer: COMMERCIAL

## 2025-05-08 DIAGNOSIS — R53.1 WEAKNESS: ICD-10-CM

## 2025-05-08 DIAGNOSIS — R26.89 BALANCE PROBLEM: Primary | ICD-10-CM

## 2025-05-08 PROCEDURE — 97162 PT EVAL MOD COMPLEX 30 MIN: CPT

## 2025-05-08 PROCEDURE — 97530 THERAPEUTIC ACTIVITIES: CPT

## 2025-05-08 NOTE — PLAN OF CARE
[facility] - Outpatient Rehabilitation and Therapy: [address], [address], [place] [address] office: [phone] fax: [phone]     Physical Therapy Initial Evaluation Certification      Dear Harry Gotti MD,    We had the pleasure of evaluating the following patient for physical therapy services at [facility].  A summary of our findings can be found in the initial assessment below.  This includes our plan of care.  If you have any questions or concerns regarding these findings, please do not hesitate to contact me at the office phone number listed above.  Thank you for the referral.     Physician Signature:_______________________________Date:__________________  By signing above (or electronic signature), therapist’s plan is approved by physician       Physical Therapy: TREATMENT/PROGRESS NOTE   Patient: Baudilio Maldonado (79 y.o. male)   Examination Date: 2025   :  1945 MRN: [id number]   Visit #:   Insurance Allowable Auth Needed   BMN []Yes    [x]No    Insurance: Payor: MEDIGOLD / Plan: MEDIGOLD / Product Type: *No Product type* /   Insurance ID: [id number] - (Medicare Managed)  Secondary Insurance (if applicable):    Treatment Diagnosis:     ICD-10-CM    1. Balance problem  R26.89       2. Weakness  R53.1          Medical Diagnosis:  Frequent falls [R29.6]   Referring Physician: Harry Gotti MD  PCP: Harry Gotti MD     Plan of care signed (Y/N): N    Date of Patient follow up with Physician: August     Plan of Care Report: EVAL today and NO  Progress note update due: (10 visits /OR AUTH LIMITS, whichever is less)  2025   Recert: 2025                                            Medical History:  Comorbidities:  Hypertension  Depression  Relevant Medical History: -                                         Precautions/ Contra-indications:           Latex allergy:  NO  Pacemaker:    NO  Contraindications for

## 2025-05-16 ENCOUNTER — HOSPITAL ENCOUNTER (OUTPATIENT)
Dept: PHYSICAL THERAPY | Age: 80
Setting detail: THERAPIES SERIES
Discharge: HOME | End: 2025-05-16
Payer: COMMERCIAL

## 2025-05-16 PROCEDURE — 97110 THERAPEUTIC EXERCISES: CPT

## 2025-05-16 NOTE — FLOWSHEET NOTE
[facility] - Outpatient Rehabilitation and Therapy: [address], [place] [address] office: [phone] fax: [phone]       Physical Therapy: TREATMENT/PROGRESS NOTE   Patient: Baudilio Maldonado (79 y.o. male)   Examination Date: 2025   :  1945 MRN: [id number]   Visit #:   Insurance Allowable Auth Needed   BMN []Yes    [x]No    Insurance: Payor: MEDIGOLD / Plan: MEDIGOLD / Product Type: *No Product type* /   Insurance ID: [id number] - (Medicare Managed)  Secondary Insurance (if applicable):    Treatment Diagnosis:     ICD-10-CM    1. Balance problem  R26.89       2. Weakness  R53.1          Medical Diagnosis:  Frequent falls [R29.6]   Referring Physician: Harry Gotti MD  PCP: Harry Gotti MD     Plan of care signed (Y/N):     Date of Patient follow up with Physician: August     Plan of Care Report: NO  Progress note update due: (10 visits /OR AUTH LIMITS, whichever is less)  2025   Recert: 2025                                            Medical History:  Comorbidities:  Hypertension  Depression  Relevant Medical History: -                                         Precautions/ Contra-indications:           Latex allergy:  NO  Pacemaker:    NO  Contraindications for Manipulation: NA  Date of Surgery: -  Other: -    Red Flags:  None    Suicide Screening:   The patient did not verbalize a primary behavioral concern, suicidal ideation, suicidal intent, or demonstrate suicidal behaviors.    Preferred Language for Healthcare:  English    SUBJECTIVE EXAMINATION     Patient stated complaint/comments: Pt reports no complaints after IE.         Test used Initial score  2025   Pain Summary VAS 0/10 at rest,  8/10 at worst 0/10   Functional questionnaire ABC Scale 52% confident    Other:                OBJECTIVE EXAMINATION     Exercises/Interventions     Observation:   PROM L hip flexion WFLs, ER 45 deg, IR 5 deg, HS 90-90 (-) 35 deg  PROM R

## 2025-05-22 ENCOUNTER — HOSPITAL ENCOUNTER (OUTPATIENT)
Dept: PHYSICAL THERAPY | Age: 80
Setting detail: THERAPIES SERIES
Discharge: HOME | End: 2025-05-22
Payer: COMMERCIAL

## 2025-05-22 PROCEDURE — 97110 THERAPEUTIC EXERCISES: CPT

## 2025-05-22 NOTE — FLOWSHEET NOTE
Patient will demonstrate TUG < 12 seconds for decreased fall risk.   [] Progressing: [] Met: [] Not Met: [] Adjusted  3. Patient will demonstrate increased Strength of B LE to 5/5 to allow for proper functional mobility to enable patient to return to stairs.   [] Progressing: [] Met: [] Not Met: [] Adjusted  4. Patient will ambulate with N gait pattern with gait speed: 2.88 ft/sec without increased symptoms or restriction.   [] Progressing: [] Met: [] Not Met: [] Adjusted  5. Independent in HEP progression per patient tolerance, in order to prevent re-injury.   [] Progressing: [] Met: [] Not Met: [] Adjusted   6. Patient will demo 30 sec sit to stand test >/= 11 reps indicating improving functional strength.   [] Progressing: [] Met: [] Not Met: [] Adjusted  7. Patient will demo SLS B >/= 6 seconds for decreased risk of falling.   [] Progressing: [] Met: [] Not Met: [] Adjusted  8. Added 05/16/2025: Patient will demonstrate B HS 90-90 (-) 15 deg, good flexibility B gastroc and PROM B hip IR 20 deg to allow for proper functional mobility for N gait pattern.   [] Progressing: [] Met: [] Not Met: [] Adjusted    Overall Progression Towards Functional goals/ Treatment Progress Update:  [] Patient is progressing as expected towards functional goals listed.    [] Progression is slowed due to complexities/Impairments listed.  [] Progression has been slowed due to co-morbidities.  [x] Plan just implemented, too soon (<30days) to assess goals progression   [] Goals require adjustment due to lack of progress  [] Patient is not progressing as expected and requires additional follow up with physician  [] Other:     TREATMENT PLAN     Plan: Cont POC- Continue emphasis/focus on exercise progression, improving proper muscle recruitment and activation/motor control patterns, increasing ROM, improving soft tissue extensibility, and static and dynamic balance. Next visit plan to do POC     Electronically Signed by Mai Tucker, PT, DPT

## 2025-05-30 ENCOUNTER — HOSPITAL ENCOUNTER (OUTPATIENT)
Dept: PHYSICAL THERAPY | Age: 80
Setting detail: THERAPIES SERIES
End: 2025-05-30
Payer: COMMERCIAL

## 2025-05-30 ENCOUNTER — HOSPITAL ENCOUNTER (OUTPATIENT)
Dept: PHYSICAL THERAPY | Age: 80
Setting detail: THERAPIES SERIES
Discharge: HOME | End: 2025-05-30
Payer: COMMERCIAL

## 2025-05-30 PROCEDURE — 97110 THERAPEUTIC EXERCISES: CPT

## 2025-05-30 NOTE — FLOWSHEET NOTE
[facility] - Outpatient Rehabilitation and Therapy: [address], [place] [address] office: [phone] fax: [phone]       Physical Therapy: TREATMENT/PROGRESS NOTE   Patient: Baudilio Maldonado (79 y.o. male)   Examination Date: 2025   :  1945 MRN: [id number]   Visit #:   Insurance Allowable Auth Needed   BMN []Yes    [x]No    Insurance: Payor: MEDIGOLD / Plan: MEDIGOLD / Product Type: *No Product type* /   Insurance ID: [id number] - (Medicare Managed)  Secondary Insurance (if applicable):    Treatment Diagnosis:     ICD-10-CM    1. Balance problem  R26.89       2. Weakness  R53.1          Medical Diagnosis:  Frequent falls [R29.6]   Referring Physician: Harry Gotti MD  PCP: Harry Gotti MD     Plan of care signed (Y/N):     Date of Patient follow up with Physician: August     Plan of Care Report: NO  Progress note update due: (10 visits /OR AUTH LIMITS, whichever is less)  2025   Recert: 2025                                            Medical History:  Comorbidities:  Hypertension  Depression  Relevant Medical History: -                                         Precautions/ Contra-indications:           Latex allergy:  NO  Pacemaker:    NO  Contraindications for Manipulation: NA  Date of Surgery: -  Other: -    Red Flags:  None    Suicide Screening:   The patient did not verbalize a primary behavioral concern, suicidal ideation, suicidal intent, or demonstrate suicidal behaviors.    Preferred Language for Healthcare:  English    SUBJECTIVE EXAMINATION     Patient stated complaint/comments: Pt states he felt muscle soreness after last visit which lasted a day or 2 then went away.  He felt he played tennis better yesterday than he had in awhile in that he felt he moved better.  Is walking a little further.        Test used Initial score  2025   Pain Summary VAS 0/10 at rest,  8/10 at worst 0/10   Functional questionnaire ABC

## 2025-06-06 ENCOUNTER — HOSPITAL ENCOUNTER (OUTPATIENT)
Dept: PHYSICAL THERAPY | Age: 80
Setting detail: THERAPIES SERIES
Discharge: HOME | End: 2025-06-06
Payer: COMMERCIAL

## 2025-06-06 PROCEDURE — 97110 THERAPEUTIC EXERCISES: CPT

## 2025-06-06 NOTE — PLAN OF CARE
(86128)    VASO (89882)     Ultrasound (71809)    Group Therapy (55179)     Estim Attended (83198)    Canalith Repositioning (75512)     Physical Performance Test (17408)    Custom orthotic ()     Other:    Other:    Total Timed Code Tx Minutes 39 3       Total Treatment Minutes 39        Charge Justification:  (58566) THERAPEUTIC EXERCISE - Provided verbal/tactile cueing for HEP and/or activities related to strengthening, flexibility, endurance, ROM performed to prevent loss of range of motion, maintain or improve muscular strength or increase flexibility, following either an injury or surgery.       GOALS   Assessed 06/06/2025  Patient stated goal: \"balance\"  [x] Progressing: [] Met: [] Not Met: [] Adjusted    Therapist goals for Patient:   Short Term Goals: To be achieved in: 4 weeks  1. Independent in initial HEP per patient tolerance, in order to prevent re-injury.   [] Progressing: [x] Met: [] Not Met: [] Adjusted  2. Assess LE flexibility and set goal as appropriate  [] Progressing: [x] Met: 05/16/2025[] Not Met: [] Adjusted    Long Term Goals: To be achieved in: 8 weeks  1. Disability index score of 75% or more confident for the ABC Scale to assist with reaching prior level of function with activities such as walking.  [x] Progressing: [] Met: [] Not Met: [] Adjusted  2. Patient will demonstrate TUG < 12 seconds for decreased fall risk.   [] Progressing: [x] Met: [] Not Met: [] Adjusted  3. Patient will demonstrate increased Strength of B LE to 5/5 to allow for proper functional mobility to enable patient to return to stairs.   [x] Progressing: [] Met: [] Not Met: [] Adjusted  4. Patient will ambulate with N gait pattern with gait speed: 2.88 ft/sec without increased symptoms or restriction.   [x] Progressing: [] Met: [] Not Met: [] Adjusted  5. Independent in HEP progression per patient tolerance, in order to prevent re-injury.   [x] Progressing: [] Met: [] Not Met: [] Adjusted   6. Patient will demo

## 2025-06-13 ENCOUNTER — HOSPITAL ENCOUNTER (OUTPATIENT)
Dept: PHYSICAL THERAPY | Age: 80
Setting detail: THERAPIES SERIES
Discharge: HOME | End: 2025-06-13
Payer: COMMERCIAL

## 2025-06-13 PROCEDURE — 97110 THERAPEUTIC EXERCISES: CPT

## 2025-06-13 NOTE — FLOWSHEET NOTE
Mai Tucker, PT, DPT [id number]  Date: 06/13/2025     Note: Portions of this note have been templated and/or copied from initial evaluation, reassessments and prior notes for documentation efficiency.    Note: If patient does not return for scheduled/recommended follow up visits, this note will serve as a discharge from care along with the most recent update on progress.

## 2025-06-19 ENCOUNTER — HOSPITAL ENCOUNTER (OUTPATIENT)
Dept: PHYSICAL THERAPY | Age: 80
Setting detail: THERAPIES SERIES
Discharge: HOME | End: 2025-06-19
Payer: COMMERCIAL

## 2025-06-19 PROCEDURE — 97110 THERAPEUTIC EXERCISES: CPT

## 2025-06-19 NOTE — FLOWSHEET NOTE
[facility] - Outpatient Rehabilitation and Therapy: [address], [place] [address] office: [phone] fax: [phone]       Physical Therapy: TREATMENT/PROGRESS NOTE   Patient: Baudilio Maldonado (79 y.o. male)   Examination Date: 2025   :  1945 MRN: [id number]   Visit #:   Insurance Allowable Auth Needed   BMN []Yes    [x]No    Insurance: Payor: MEDIGOLD / Plan: MEDIGOLD / Product Type: *No Product type* /   Insurance ID: [id number] - (Medicare Managed)  Secondary Insurance (if applicable):    Treatment Diagnosis:     ICD-10-CM    1. Balance problem  R26.89       2. Weakness  R53.1          Medical Diagnosis:  Frequent falls [R29.6]   Referring Physician: Harry Gotti MD  PCP: Harry Gotti MD     Plan of care signed (Y/N):     Date of Patient follow up with Physician: August     Plan of Care Report: NO  Progress note update due: (10 visits /OR AUTH LIMITS, whichever is less)  2025   Recert: 2025                                            Medical History:  Comorbidities:  Hypertension  Depression  Relevant Medical History: -                                         Precautions/ Contra-indications:           Latex allergy:  NO  Pacemaker:    NO  Contraindications for Manipulation: NA  Date of Surgery: -  Other: -    Red Flags:  None    Suicide Screening:   The patient did not verbalize a primary behavioral concern, suicidal ideation, suicidal intent, or demonstrate suicidal behaviors.    Preferred Language for Healthcare:  English    SUBJECTIVE EXAMINATION     Patient stated complaint/comments: Pt reports that he got sore after doing SKT opp shoulder stretch at home - unsure if he had the correct technique.          Test used Initial score  2025   Pain Summary VAS 0/10 at rest,  8/10 at worst 0/10   Functional questionnaire ABC Scale 52% confident    Other:                OBJECTIVE EXAMINATION     Exercises/Interventions

## 2025-06-27 ENCOUNTER — HOSPITAL ENCOUNTER (OUTPATIENT)
Dept: PHYSICAL THERAPY | Age: 80
Setting detail: THERAPIES SERIES
Discharge: HOME | End: 2025-06-27
Payer: COMMERCIAL

## 2025-06-27 PROCEDURE — 97110 THERAPEUTIC EXERCISES: CPT

## 2025-06-27 PROCEDURE — 97112 NEUROMUSCULAR REEDUCATION: CPT

## 2025-06-27 NOTE — PLAN OF CARE
decreased fall risk.   [] Progressing: [x] Met: [] Not Met: [] Adjusted  3. Patient will demonstrate increased Strength of B LE to 5/5 to allow for proper functional mobility to enable patient to return to stairs.   [x] Progressing: [] Met: [] Not Met: [] Adjusted  4. Patient will ambulate with N gait pattern with gait speed: 2.88 ft/sec without increased symptoms or restriction.   [] Progressing: [x] Met: [] Not Met: [] Adjusted  5. Independent in HEP progression per patient tolerance, in order to prevent re-injury.   [x] Progressing: [] Met: [] Not Met: [] Adjusted   6. Patient will demo 30 sec sit to stand test >/= 11 reps indicating improving functional strength.   [] Progressing: [x] Met: [] Not Met: [] Adjusted  7. Patient will demo SLS B >/= 6 seconds for decreased risk of falling.   [x] Progressing: [] Met: [] Not Met: [] Adjusted  8. Added 05/16/2025: Patient will demonstrate B HS 90-90 (-) 15 deg, good flexibility B gastroc and PROM B hip IR 20 deg to allow for proper functional mobility for N gait pattern.   [x] Progressing: [] Met: [] Not Met: [] Adjusted    Overall Progression Towards Functional goals/ Treatment Progress Update:  [x] Patient is progressing as expected towards functional goals listed.    [] Progression is slowed due to complexities/Impairments listed.  [] Progression has been slowed due to co-morbidities.  [] Plan just implemented, too soon (<30days) to assess goals progression   [] Goals require adjustment due to lack of progress  [] Patient is not progressing as expected and requires additional follow up with physician  [] Other:     TREATMENT PLAN     Plan: Cont POC- Continue emphasis/focus on exercise progression, improving proper muscle recruitment and activation/motor control patterns, increasing ROM, improving soft tissue extensibility, and static and dynamic balance. Next visit plan to do POC     Electronically Signed by Mai Tucker PT, DPT [id number]  Date: 06/27/2025     Note:

## 2025-07-11 ENCOUNTER — HOSPITAL ENCOUNTER (OUTPATIENT)
Dept: PHYSICAL THERAPY | Age: 80
Setting detail: THERAPIES SERIES
Discharge: HOME | End: 2025-07-11
Payer: COMMERCIAL

## 2025-07-11 PROCEDURE — 97112 NEUROMUSCULAR REEDUCATION: CPT

## 2025-07-11 PROCEDURE — 97110 THERAPEUTIC EXERCISES: CPT

## 2025-07-11 NOTE — FLOWSHEET NOTE
[facility] - Outpatient Rehabilitation and Therapy: [address], [place] [address] office: [phone] fax: [phone]       Physical Therapy: TREATMENT/PROGRESS NOTE   Patient: Baudilio Maldonado (79 y.o. male)   Examination Date: 2025   :  1945 MRN: [id number]   Visit #: 9/ 10  Insurance Allowable Auth Needed   BMN []Yes    [x]No    Insurance: Payor: MEDIGOLD / Plan: MEDIGOLD / Product Type: *No Product type* /   Insurance ID: [id number] - (Medicare Managed)  Secondary Insurance (if applicable):    Treatment Diagnosis:     ICD-10-CM    1. Balance problem  R26.89       2. Weakness  R53.1          Medical Diagnosis:  Frequent falls [R29.6]   Referring Physician: Harry Gotti MD  PCP: Harry Gotti MD     Plan of care signed (Y/N):     Date of Patient follow up with Physician: August     Plan of Care Report: NO  Progress note update due: (10 visits /OR AUTH LIMITS, whichever is less)  2025   Recert: 2025                                            Medical History:  Comorbidities:  Hypertension  Depression  Relevant Medical History: -                                         Precautions/ Contra-indications:           Latex allergy:  NO  Pacemaker:    NO  Contraindications for Manipulation: NA  Date of Surgery: -  Other: -    Red Flags:  None    Suicide Screening:   The patient did not verbalize a primary behavioral concern, suicidal ideation, suicidal intent, or demonstrate suicidal behaviors.    Preferred Language for Healthcare:  English    SUBJECTIVE EXAMINATION     Patient stated complaint/comments: Pt reports no complaints after last session.  With walking, pt will feel pain R knee which causes him to need to sit down - encouraged pt discuss with MD if continues - may need x-ray to determine if OA related.        Test used Initial score  2025   Pain Summary VAS 0/10 at rest,  8/10 at worst 0/10   Functional questionnaire ABC Scale

## 2025-07-14 NOTE — TELEPHONE ENCOUNTER
Medication:   Requested Prescriptions     Pending Prescriptions Disp Refills    PARoxetine (PAXIL) 40 MG tablet [Pharmacy Med Name: PAROXETINE HCL 40 MG TABLET] 90 tablet 3     Sig: TAKE 1 TABLET BY MOUTH DAILY        Last Filled:      Patient Phone Number: [phone] (home)     Last appt: 4/24/2025   Next appt: 8/27/2025    Last OARRS:        No data to display

## 2025-07-15 RX ORDER — PAROXETINE 40 MG/1
40 TABLET, FILM COATED ORAL DAILY
Qty: 90 TABLET | Refills: 3 | Status: SHIPPED | OUTPATIENT
Start: 2025-07-15 | End: 2026-07-10

## 2025-07-25 ENCOUNTER — HOSPITAL ENCOUNTER (OUTPATIENT)
Dept: PHYSICAL THERAPY | Age: 80
Setting detail: THERAPIES SERIES
Discharge: HOME | End: 2025-07-25
Payer: COMMERCIAL

## 2025-07-25 PROCEDURE — 97530 THERAPEUTIC ACTIVITIES: CPT

## 2025-07-25 PROCEDURE — 97110 THERAPEUTIC EXERCISES: CPT

## 2025-07-25 NOTE — PLAN OF CARE
[facility] - Outpatient Rehabilitation and Therapy: [address], [address], [place] [address] office: [phone] fax: [phone]     Physical Therapy Re-Certification Plan of Care    Dear Harry Gotti MD  ,    We had the pleasure of treating the following patient for physical therapy services at [facility] Out[facility] Physical Therapy. A summary of our findings can be found in the updated assessment below.  This includes our plan of care.  If you have any questions or concerns regarding these findings, please do not hesitate to contact me at the office phone number checked above.  Thank you for the referral.     Physician Signature:________________________________Date:__________________  By signing above (or electronic signature), therapist's plan is approved by physician      Total Visits: 10     Overall Response to Treatment:  Patient is responding well to treatment and improvement is noted with regards to goals    Recommendation:    [x] Continue PT 1x every other week for 1-2 visits.   [] Hold PT, pending MD visit   [] Discharge to [facility]. Follow up with PT or MD PRN.     Physical Therapy: TREATMENT/PROGRESS NOTE   Patient: Baudilio Maldonado (79 y.o. male)   Examination Date: 2025   :  1945 MRN: [id number]   Visit #: 10/ 12  Insurance Allowable Auth Needed   BMN []Yes    [x]No    Insurance: Payor: MEDIGOLD / Plan: MEDIGOLD / Product Type: *No Product type* /   Insurance ID: [id number] - (Medicare Managed)  Secondary Insurance (if applicable):    Treatment Diagnosis:     ICD-10-CM    1. Balance problem  R26.89       2. Weakness  R53.1          Medical Diagnosis:  Frequent falls [R29.6]   Referring Physician: Harry Gotti MD  PCP: Harry Gotti MD     Plan of care signed (Y/N): Y    Date of Patient follow up with Physician: August     Plan of Care Report: YES, Date Range for this report: 2025 to 2025  Progress note update due: (10 visits /OR AUTH

## 2025-07-30 ENCOUNTER — OFFICE VISIT (OUTPATIENT)
Dept: FAMILY MEDICINE CLINIC | Age: 80
End: 2025-07-30
Payer: COMMERCIAL

## 2025-07-30 VITALS
BODY MASS INDEX: 30.28 KG/M2 | DIASTOLIC BLOOD PRESSURE: 80 MMHG | HEART RATE: 76 BPM | OXYGEN SATURATION: 96 % | WEIGHT: 211 LBS | SYSTOLIC BLOOD PRESSURE: 140 MMHG

## 2025-07-30 DIAGNOSIS — R07.9 CHEST PAIN, UNSPECIFIED TYPE: Primary | ICD-10-CM

## 2025-07-30 DIAGNOSIS — R53.83 OTHER FATIGUE: ICD-10-CM

## 2025-07-30 LAB
BILIRUBIN, POC: NORMAL
BLOOD URINE, POC: NORMAL
CLARITY, POC: CLEAR
COLOR, POC: YELLOW
GLUCOSE URINE, POC: NORMAL MG/DL
KETONES, POC: NORMAL MG/DL
LEUKOCYTE EST, POC: NORMAL
NITRITE, POC: NORMAL
PH, POC: 6
PROTEIN, POC: 100 MG/DL
SPECIFIC GRAVITY, POC: 1.02
UROBILINOGEN, POC: 8 MG/DL

## 2025-07-30 PROCEDURE — 81002 URINALYSIS NONAUTO W/O SCOPE: CPT | Performed by: NURSE PRACTITIONER

## 2025-07-30 PROCEDURE — 1159F MED LIST DOCD IN RCRD: CPT | Performed by: NURSE PRACTITIONER

## 2025-07-30 PROCEDURE — 99215 OFFICE O/P EST HI 40 MIN: CPT | Performed by: NURSE PRACTITIONER

## 2025-07-30 PROCEDURE — 3077F SYST BP >= 140 MM HG: CPT | Performed by: NURSE PRACTITIONER

## 2025-07-30 PROCEDURE — 93000 ELECTROCARDIOGRAM COMPLETE: CPT | Performed by: NURSE PRACTITIONER

## 2025-07-30 PROCEDURE — 3079F DIAST BP 80-89 MM HG: CPT | Performed by: NURSE PRACTITIONER

## 2025-07-30 PROCEDURE — 1123F ACP DISCUSS/DSCN MKR DOCD: CPT | Performed by: NURSE PRACTITIONER

## 2025-07-30 PROCEDURE — 36415 COLL VENOUS BLD VENIPUNCTURE: CPT | Performed by: NURSE PRACTITIONER

## 2025-07-30 RX ORDER — ASPIRIN 81 MG/1
81 TABLET ORAL DAILY
Qty: 90 TABLET | Refills: 0 | Status: SHIPPED | OUTPATIENT
Start: 2025-07-30

## 2025-07-30 RX ORDER — TRAZODONE HYDROCHLORIDE 50 MG/1
50 TABLET ORAL NIGHTLY
Qty: 90 TABLET | Refills: 1 | Status: SHIPPED | OUTPATIENT
Start: 2025-07-30

## 2025-07-30 NOTE — PROGRESS NOTES
The 10-year ASCVD risk score (Lucia ARAIZA, et al., 2019) is: 67.7%    Values used to calculate the score:      Age: 79 years      Sex: Male      Is Non- : No      Diabetic: Yes      Tobacco smoker: No      Systolic Blood Pressure: 140 mmHg      Is BP treated: Yes      HDL Cholesterol: 31 mg/dL      Total Cholesterol: 208 mg/dL          Baudilio Maldonado (:  1945) is a 79 y.o. male,Established patient, here for evaluation of the following chief complaint(s):  Leg Pain (Started last week with keg cramps and pain, left side pain, SOB with exertion this is new)         Assessment & Plan  Chest pain, unspecified type   EKG: normal sinus rhythm, LBBB, left atrial enlargement.    Advised to schedule with cardiology for follow up of abnormal EKG  Will start on baby aspirin daily    Orders:    EKG 12 lead    Troponin    Hilary Betancourt DO, Cardiology, [facility]    aspirin 81 MG EC tablet; Take 1 tablet by mouth daily    Other fatigue   - advised to push fluids and avoid being in the heat as I advised it can excacerbate his sx  - will check renal function and blood counts  - check tronponin as well     Orders:    POCT Urinalysis no Micro    Comprehensive Metabolic Panel    CBC with Auto Differential    Culture, Urine    - urine dip showed increased bilirubin, ketones, no signs of leuks or nitrites, blood    Return if symptoms worsen or fail to improve.       Subjective   HPI    Patient presents today for sob, left sided chest pain, fatigue, and lower leg cramping. States sx started two weeks ago with bilateral lower leg cramping. States he had played tennis over the weekend in which he felt very weak and fatigue afterwards. States the left sided chest pain began over the last few days and now feels sob. Denies any significant cardiac history or history of TIA. Denies blood thinners. States today, did not remember what day it was or could recall the time when looking at the clock. Is

## 2025-07-31 LAB
ALBUMIN SERPL-MCNC: 4.1 G/DL (ref 3.4–5)
ALBUMIN/GLOB SERPL: 1.2 {RATIO} (ref 1.1–2.2)
ALP SERPL-CCNC: 50 U/L (ref 40–129)
ALT SERPL-CCNC: 20 U/L (ref 10–40)
ANION GAP SERPL CALCULATED.3IONS-SCNC: 15 MMOL/L (ref 3–16)
AST SERPL-CCNC: 29 U/L (ref 15–37)
BASOPHILS # BLD: 0 K/UL (ref 0–0.2)
BASOPHILS NFR BLD: 0 %
BILIRUB SERPL-MCNC: 1.3 MG/DL (ref 0–1)
BUN SERPL-MCNC: 18 MG/DL (ref 7–20)
CALCIUM SERPL-MCNC: 9.8 MG/DL (ref 8.3–10.6)
CHLORIDE SERPL-SCNC: 97 MMOL/L (ref 99–110)
CO2 SERPL-SCNC: 23 MMOL/L (ref 21–32)
CREAT SERPL-MCNC: 1 MG/DL (ref 0.8–1.3)
DEPRECATED RDW RBC AUTO: 13.5 % (ref 12.4–15.4)
EOSINOPHIL # BLD: 0.3 K/UL (ref 0–0.6)
EOSINOPHIL NFR BLD: 2 %
GFR SERPLBLD CREATININE-BSD FMLA CKD-EPI: 76 ML/MIN/{1.73_M2}
GLUCOSE SERPL-MCNC: 143 MG/DL (ref 70–99)
HCT VFR BLD AUTO: 46.5 % (ref 40.5–52.5)
HGB BLD-MCNC: 16.2 G/DL (ref 13.5–17.5)
LYMPHOCYTES # BLD: 1.4 K/UL (ref 1–5.1)
LYMPHOCYTES NFR BLD: 7 %
MCH RBC QN AUTO: 32 PG (ref 26–34)
MCHC RBC AUTO-ENTMCNC: 34.8 G/DL (ref 31–36)
MCV RBC AUTO: 91.9 FL (ref 80–100)
MONOCYTES # BLD: 1.7 K/UL (ref 0–1.3)
MONOCYTES NFR BLD: 11 %
MYELOCYTES NFR BLD MANUAL: 1 %
NEUTROPHILS # BLD: 12.4 K/UL (ref 1.7–7.7)
NEUTROPHILS NFR BLD: 77 %
PATH INTERP BLD-IMP: YES
PLATELET # BLD AUTO: 267 K/UL (ref 135–450)
PLATELET BLD QL SMEAR: ABNORMAL
PMV BLD AUTO: 8.8 FL (ref 5–10.5)
POTASSIUM SERPL-SCNC: 4.5 MMOL/L (ref 3.5–5.1)
PROT SERPL-MCNC: 7.6 G/DL (ref 6.4–8.2)
RBC # BLD AUTO: 5.06 M/UL (ref 4.2–5.9)
RBC MORPH BLD: NORMAL
SLIDE REVIEW: ABNORMAL
SODIUM SERPL-SCNC: 135 MMOL/L (ref 136–145)
TROPONIN, HIGH SENSITIVITY: 24 NG/L (ref 0–22)
VARIANT LYMPHS NFR BLD MANUAL: 2 % (ref 0–6)
WBC # BLD AUTO: 15.9 K/UL (ref 4–11)

## 2025-08-01 LAB
BACTERIA UR CULT: NORMAL
PATH INTERP BLD-IMP: NORMAL

## 2025-08-06 ENCOUNTER — HOSPITAL ENCOUNTER (OUTPATIENT)
Dept: GENERAL RADIOLOGY | Age: 80
Discharge: HOME | End: 2025-08-06
Payer: COMMERCIAL

## 2025-08-06 ENCOUNTER — HOSPITAL ENCOUNTER (OUTPATIENT)
Dept: GENERAL RADIOLOGY | Age: 80
End: 2025-08-06
Payer: COMMERCIAL

## 2025-08-06 DIAGNOSIS — R06.02 SOB (SHORTNESS OF BREATH): ICD-10-CM

## 2025-08-06 PROCEDURE — 71046 X-RAY EXAM CHEST 2 VIEWS: CPT

## 2025-08-07 ENCOUNTER — TELEPHONE (OUTPATIENT)
Dept: FAMILY MEDICINE CLINIC | Age: 80
End: 2025-08-07

## 2025-08-07 DIAGNOSIS — G47.33 OBSTRUCTIVE SLEEP APNEA SYNDROME: Primary | ICD-10-CM

## 2025-08-08 ENCOUNTER — HOSPITAL ENCOUNTER (OUTPATIENT)
Dept: PHYSICAL THERAPY | Age: 80
Setting detail: THERAPIES SERIES
Discharge: HOME | End: 2025-08-08
Payer: COMMERCIAL

## 2025-08-08 PROCEDURE — 97110 THERAPEUTIC EXERCISES: CPT

## 2025-08-11 ENCOUNTER — TELEPHONE (OUTPATIENT)
Dept: FAMILY MEDICINE CLINIC | Age: 80
End: 2025-08-11

## 2025-08-12 DIAGNOSIS — R91.8 MASS OF LOWER LOBE OF LEFT LUNG: Primary | ICD-10-CM

## 2025-08-15 PROBLEM — R94.31 ABNORMAL EKG: Status: ACTIVE | Noted: 2025-08-15

## 2025-08-20 ENCOUNTER — OFFICE VISIT (OUTPATIENT)
Dept: CARDIOLOGY CLINIC | Age: 80
End: 2025-08-20
Payer: COMMERCIAL

## 2025-08-20 VITALS
OXYGEN SATURATION: 98 % | SYSTOLIC BLOOD PRESSURE: 162 MMHG | HEIGHT: 70 IN | HEART RATE: 52 BPM | DIASTOLIC BLOOD PRESSURE: 84 MMHG | WEIGHT: 214 LBS | BODY MASS INDEX: 30.64 KG/M2

## 2025-08-20 DIAGNOSIS — R93.1 ABNORMAL FINDINGS ON DIAGNOSTIC IMAGING OF HEART AND CORONARY CIRCULATION: ICD-10-CM

## 2025-08-20 DIAGNOSIS — I20.9 ANGINA PECTORIS: ICD-10-CM

## 2025-08-20 DIAGNOSIS — I44.7 INCOMPLETE LEFT BUNDLE BRANCH BLOCK (LBBB): ICD-10-CM

## 2025-08-20 DIAGNOSIS — I10 PRIMARY HYPERTENSION: ICD-10-CM

## 2025-08-20 DIAGNOSIS — R94.31 ABNORMAL EKG: ICD-10-CM

## 2025-08-20 DIAGNOSIS — E78.2 MIXED HYPERLIPIDEMIA: Primary | ICD-10-CM

## 2025-08-20 PROCEDURE — 3077F SYST BP >= 140 MM HG: CPT | Performed by: INTERNAL MEDICINE

## 2025-08-20 PROCEDURE — 99204 OFFICE O/P NEW MOD 45 MIN: CPT | Performed by: INTERNAL MEDICINE

## 2025-08-20 PROCEDURE — 93000 ELECTROCARDIOGRAM COMPLETE: CPT | Performed by: INTERNAL MEDICINE

## 2025-08-20 PROCEDURE — 1123F ACP DISCUSS/DSCN MKR DOCD: CPT | Performed by: INTERNAL MEDICINE

## 2025-08-20 PROCEDURE — 3079F DIAST BP 80-89 MM HG: CPT | Performed by: INTERNAL MEDICINE

## 2025-08-20 PROCEDURE — 1159F MED LIST DOCD IN RCRD: CPT | Performed by: INTERNAL MEDICINE

## 2025-08-20 RX ORDER — AMLODIPINE AND BENAZEPRIL HYDROCHLORIDE 10; 40 MG/1; MG/1
1 CAPSULE ORAL DAILY
Qty: 30 CAPSULE | Refills: 3 | Status: SHIPPED | OUTPATIENT
Start: 2025-08-20

## 2025-08-20 RX ORDER — SODIUM CHLORIDE 0.9 % (FLUSH) 0.9 %
5-40 SYRINGE (ML) INJECTION PRN
OUTPATIENT
Start: 2025-08-20

## 2025-08-20 RX ORDER — METOPROLOL TARTRATE 1 MG/ML
5 INJECTION, SOLUTION INTRAVENOUS EVERY 5 MIN PRN
OUTPATIENT
Start: 2025-08-20

## 2025-08-20 RX ORDER — NITROGLYCERIN 0.4 MG/1
0.4 TABLET SUBLINGUAL
OUTPATIENT
Start: 2025-08-20

## 2025-08-20 RX ORDER — SODIUM CHLORIDE 9 MG/ML
INJECTION, SOLUTION INTRAVENOUS PRN
OUTPATIENT
Start: 2025-08-20

## 2025-08-20 RX ORDER — SODIUM CHLORIDE 0.9 % (FLUSH) 0.9 %
5-40 SYRINGE (ML) INJECTION EVERY 12 HOURS SCHEDULED
OUTPATIENT
Start: 2025-08-20

## 2025-08-20 RX ORDER — NITROGLYCERIN 0.4 MG/1
0.8 TABLET SUBLINGUAL
OUTPATIENT
Start: 2025-08-20

## 2025-08-21 ENCOUNTER — HOSPITAL ENCOUNTER (OUTPATIENT)
Dept: CT IMAGING | Age: 80
Discharge: HOME | End: 2025-08-21
Attending: FAMILY MEDICINE
Payer: COMMERCIAL

## 2025-08-21 ENCOUNTER — OFFICE VISIT (OUTPATIENT)
Dept: SLEEP MEDICINE | Age: 80
End: 2025-08-21
Payer: COMMERCIAL

## 2025-08-21 VITALS
DIASTOLIC BLOOD PRESSURE: 80 MMHG | RESPIRATION RATE: 18 BRPM | SYSTOLIC BLOOD PRESSURE: 145 MMHG | TEMPERATURE: 97.9 F | OXYGEN SATURATION: 98 % | WEIGHT: 214.8 LBS | BODY MASS INDEX: 30.75 KG/M2 | HEIGHT: 70 IN | HEART RATE: 43 BPM

## 2025-08-21 DIAGNOSIS — R91.8 MASS OF LOWER LOBE OF LEFT LUNG: ICD-10-CM

## 2025-08-21 DIAGNOSIS — G47.19 EXCESSIVE DAYTIME SLEEPINESS: ICD-10-CM

## 2025-08-21 DIAGNOSIS — E66.09 CLASS 1 OBESITY DUE TO EXCESS CALORIES WITHOUT SERIOUS COMORBIDITY WITH BODY MASS INDEX (BMI) OF 30.0 TO 30.9 IN ADULT: ICD-10-CM

## 2025-08-21 DIAGNOSIS — E66.811 CLASS 1 OBESITY DUE TO EXCESS CALORIES WITHOUT SERIOUS COMORBIDITY WITH BODY MASS INDEX (BMI) OF 30.0 TO 30.9 IN ADULT: ICD-10-CM

## 2025-08-21 DIAGNOSIS — I10 PRIMARY HYPERTENSION: ICD-10-CM

## 2025-08-21 DIAGNOSIS — G47.33 OSA (OBSTRUCTIVE SLEEP APNEA): Primary | ICD-10-CM

## 2025-08-21 DIAGNOSIS — R06.83 SNORING: ICD-10-CM

## 2025-08-21 PROCEDURE — 1159F MED LIST DOCD IN RCRD: CPT | Performed by: PSYCHIATRY & NEUROLOGY

## 2025-08-21 PROCEDURE — 3077F SYST BP >= 140 MM HG: CPT | Performed by: PSYCHIATRY & NEUROLOGY

## 2025-08-21 PROCEDURE — 99204 OFFICE O/P NEW MOD 45 MIN: CPT | Performed by: PSYCHIATRY & NEUROLOGY

## 2025-08-21 PROCEDURE — 71260 CT THORAX DX C+: CPT

## 2025-08-21 PROCEDURE — 1160F RVW MEDS BY RX/DR IN RCRD: CPT | Performed by: PSYCHIATRY & NEUROLOGY

## 2025-08-21 PROCEDURE — 3079F DIAST BP 80-89 MM HG: CPT | Performed by: PSYCHIATRY & NEUROLOGY

## 2025-08-21 PROCEDURE — 6360000004 HC RX CONTRAST MEDICATION: Performed by: FAMILY MEDICINE

## 2025-08-21 PROCEDURE — 1123F ACP DISCUSS/DSCN MKR DOCD: CPT | Performed by: PSYCHIATRY & NEUROLOGY

## 2025-08-21 PROCEDURE — G2211 COMPLEX E/M VISIT ADD ON: HCPCS | Performed by: PSYCHIATRY & NEUROLOGY

## 2025-08-21 RX ORDER — IOPAMIDOL 755 MG/ML
75 INJECTION, SOLUTION INTRAVASCULAR
Status: COMPLETED | OUTPATIENT
Start: 2025-08-21 | End: 2025-08-21

## 2025-08-21 RX ADMIN — IOPAMIDOL 75 ML: 755 INJECTION, SOLUTION INTRAVENOUS at 12:20

## 2025-08-21 ASSESSMENT — SLEEP AND FATIGUE QUESTIONNAIRES
HOW LIKELY ARE YOU TO NOD OFF OR FALL ASLEEP WHILE SITTING AND TALKING TO SOMEONE: WOULD NEVER DOZE
HOW LIKELY ARE YOU TO NOD OFF OR FALL ASLEEP WHEN YOU ARE A PASSENGER IN A CAR FOR AN HOUR WITHOUT A BREAK: HIGH CHANCE OF DOZING
HOW LIKELY ARE YOU TO NOD OFF OR FALL ASLEEP WHILE LYING DOWN TO REST IN THE AFTERNOON WHEN CIRCUMSTANCES PERMIT: HIGH CHANCE OF DOZING
HOW LIKELY ARE YOU TO NOD OFF OR FALL ASLEEP WHILE WATCHING TV: HIGH CHANCE OF DOZING
HOW LIKELY ARE YOU TO NOD OFF OR FALL ASLEEP WHILE SITTING INACTIVE IN A PUBLIC PLACE: HIGH CHANCE OF DOZING
HOW LIKELY ARE YOU TO NOD OFF OR FALL ASLEEP WHILE SITTING QUIETLY AFTER LUNCH WITHOUT ALCOHOL: HIGH CHANCE OF DOZING
HOW LIKELY ARE YOU TO NOD OFF OR FALL ASLEEP WHILE SITTING AND READING: HIGH CHANCE OF DOZING
ESS TOTAL SCORE: 18
HOW LIKELY ARE YOU TO NOD OFF OR FALL ASLEEP IN A CAR, WHILE STOPPED FOR A FEW MINUTES IN TRAFFIC: WOULD NEVER DOZE

## 2025-08-21 ASSESSMENT — ENCOUNTER SYMPTOMS
GASTROINTESTINAL NEGATIVE: 1
EYES NEGATIVE: 1
ALLERGIC/IMMUNOLOGIC NEGATIVE: 1
RESPIRATORY NEGATIVE: 1

## 2025-08-27 ENCOUNTER — OFFICE VISIT (OUTPATIENT)
Dept: PULMONOLOGY | Age: 80
End: 2025-08-27
Payer: COMMERCIAL

## 2025-08-27 VITALS
RESPIRATION RATE: 18 BRPM | OXYGEN SATURATION: 97 % | HEIGHT: 70 IN | TEMPERATURE: 97.4 F | WEIGHT: 214.8 LBS | DIASTOLIC BLOOD PRESSURE: 72 MMHG | BODY MASS INDEX: 30.75 KG/M2 | HEART RATE: 52 BPM | SYSTOLIC BLOOD PRESSURE: 130 MMHG

## 2025-08-27 DIAGNOSIS — R91.8 MASS OF LEFT LUNG: Primary | ICD-10-CM

## 2025-08-27 PROCEDURE — 3075F SYST BP GE 130 - 139MM HG: CPT | Performed by: INTERNAL MEDICINE

## 2025-08-27 PROCEDURE — 99204 OFFICE O/P NEW MOD 45 MIN: CPT | Performed by: INTERNAL MEDICINE

## 2025-08-27 PROCEDURE — 1123F ACP DISCUSS/DSCN MKR DOCD: CPT | Performed by: INTERNAL MEDICINE

## 2025-08-27 PROCEDURE — 3078F DIAST BP <80 MM HG: CPT | Performed by: INTERNAL MEDICINE

## 2025-08-27 PROCEDURE — 1159F MED LIST DOCD IN RCRD: CPT | Performed by: INTERNAL MEDICINE

## 2025-08-27 ASSESSMENT — ENCOUNTER SYMPTOMS: RESPIRATORY NEGATIVE: 1

## 2025-09-06 ENCOUNTER — HOSPITAL ENCOUNTER (OUTPATIENT)
Dept: SLEEP CENTER | Age: 80
Discharge: HOME | End: 2025-09-06